# Patient Record
Sex: FEMALE | Race: BLACK OR AFRICAN AMERICAN | Employment: OTHER | URBAN - METROPOLITAN AREA
[De-identification: names, ages, dates, MRNs, and addresses within clinical notes are randomized per-mention and may not be internally consistent; named-entity substitution may affect disease eponyms.]

---

## 2017-01-11 ENCOUNTER — TELEPHONE (OUTPATIENT)
Dept: FAMILY MEDICINE CLINIC | Facility: CLINIC | Age: 42
End: 2017-01-11

## 2017-01-11 NOTE — TELEPHONE ENCOUNTER
Pt states she has abd pain x 3 days and it is sharp and sudden then passes, starts at the middle and radiates upwards. Denies fever,nausea,vomiting SOB. Voiding ok BM ok. Please advise.

## 2017-02-22 ENCOUNTER — OFFICE VISIT (OUTPATIENT)
Dept: FAMILY MEDICINE CLINIC | Facility: CLINIC | Age: 42
End: 2017-02-22

## 2017-02-22 VITALS
SYSTOLIC BLOOD PRESSURE: 114 MMHG | HEART RATE: 71 BPM | HEIGHT: 61 IN | RESPIRATION RATE: 14 BRPM | TEMPERATURE: 98 F | OXYGEN SATURATION: 99 % | BODY MASS INDEX: 41.54 KG/M2 | DIASTOLIC BLOOD PRESSURE: 70 MMHG | WEIGHT: 220 LBS

## 2017-02-22 DIAGNOSIS — J01.40 ACUTE NON-RECURRENT PANSINUSITIS: Primary | ICD-10-CM

## 2017-02-22 DIAGNOSIS — B37.9 ANTIBIOTIC-INDUCED YEAST INFECTION: ICD-10-CM

## 2017-02-22 DIAGNOSIS — T36.95XA ANTIBIOTIC-INDUCED YEAST INFECTION: ICD-10-CM

## 2017-02-22 PROCEDURE — 99213 OFFICE O/P EST LOW 20 MIN: CPT | Performed by: FAMILY MEDICINE

## 2017-02-22 RX ORDER — FLUCONAZOLE 150 MG/1
150 TABLET ORAL ONCE
Qty: 1 TABLET | Refills: 0 | Status: SHIPPED | OUTPATIENT
Start: 2017-02-22 | End: 2017-02-22

## 2017-02-22 RX ORDER — BENZONATATE 200 MG/1
200 CAPSULE ORAL 3 TIMES DAILY PRN
Qty: 30 CAPSULE | Refills: 0 | Status: SHIPPED | OUTPATIENT
Start: 2017-02-22 | End: 2017-04-03 | Stop reason: ALTCHOICE

## 2017-02-22 RX ORDER — AMOXICILLIN AND CLAVULANATE POTASSIUM 875; 125 MG/1; MG/1
1 TABLET, FILM COATED ORAL 2 TIMES DAILY
Qty: 20 TABLET | Refills: 0 | Status: SHIPPED | OUTPATIENT
Start: 2017-02-22 | End: 2017-03-04

## 2017-02-22 RX ORDER — PROMETHAZINE HYDROCHLORIDE AND CODEINE PHOSPHATE 6.25; 1 MG/5ML; MG/5ML
5 SYRUP ORAL NIGHTLY PRN
Qty: 118 ML | Refills: 0 | Status: SHIPPED | OUTPATIENT
Start: 2017-02-22 | End: 2017-09-11

## 2017-02-22 NOTE — PROGRESS NOTES
HPI:   Luisa Dean is a 46year old female who presents for upper respiratory symptoms for  5  days. Patient reports congestion, ear pain, sinus pain, wheezing.       Current Outpatient Prescriptions:  promethazine-codeine 6.25-10 MG/5ML Oral Syrup Take unspecified endocrine, nutritional, metabolic, and immunity disorders    • Screening for other and unspecified genitourinary condition    • Screening for lipoid disorders    • Screening for other and unspecified endocrine, nutritional, metabolic, and immun discussed. The patient indicates understanding of these issues and agrees to the plan. The patient is asked to return if sx's persist or worsen.

## 2017-03-23 RX ORDER — TOPIRAMATE 50 MG/1
TABLET, FILM COATED ORAL
Qty: 60 TABLET | Refills: 0 | Status: SHIPPED | OUTPATIENT
Start: 2017-03-23 | End: 2018-12-06 | Stop reason: SINTOL

## 2017-04-03 ENCOUNTER — OFFICE VISIT (OUTPATIENT)
Dept: FAMILY MEDICINE CLINIC | Facility: CLINIC | Age: 42
End: 2017-04-03

## 2017-04-03 VITALS
DIASTOLIC BLOOD PRESSURE: 62 MMHG | HEIGHT: 61 IN | RESPIRATION RATE: 18 BRPM | HEART RATE: 80 BPM | BODY MASS INDEX: 42.29 KG/M2 | SYSTOLIC BLOOD PRESSURE: 108 MMHG | WEIGHT: 224 LBS | TEMPERATURE: 98 F

## 2017-04-03 DIAGNOSIS — M21.41 FLAT FEET, BILATERAL: Primary | ICD-10-CM

## 2017-04-03 DIAGNOSIS — G89.29 CHRONIC PAIN OF RIGHT ANKLE: ICD-10-CM

## 2017-04-03 DIAGNOSIS — M21.42 FLAT FEET, BILATERAL: Primary | ICD-10-CM

## 2017-04-03 DIAGNOSIS — M79.672 LEFT FOOT PAIN: ICD-10-CM

## 2017-04-03 DIAGNOSIS — M25.571 CHRONIC PAIN OF RIGHT ANKLE: ICD-10-CM

## 2017-04-03 PROCEDURE — 99213 OFFICE O/P EST LOW 20 MIN: CPT | Performed by: FAMILY MEDICINE

## 2017-04-03 NOTE — PROGRESS NOTES
Tanya Pop is a 46year old female. HPI:   Pt. States that her feet are better. Her carmita's is 95 % better. The lift is on the left and it helps, but now notes pain on the lateral left foot rather than in the ankle.    The arch support on the right endocrine, nutritional, metabolic, and immunity disorders    • Screening for thyroid disorder    • Migraines       Social History:    Smoking Status: Never Smoker                      Smokeless Status: Never Used                        Alcohol Use: No Absolute 2.54 0.90-4.00 x10(3) uL   Monocyte Absolute 0.70 (H) 0.10-0.60 x10(3) uL   Eosinophil Absolute 0.07 0.00-0.30 x10(3) uL   Basophil Absolute 0.05 0.00-0.10 x10(3) uL   Immature Granulocyte Absolute 0.03 0.00-1.00 x10(3) uL   Neutrophil % 59.3 %

## 2017-05-31 ENCOUNTER — TELEPHONE (OUTPATIENT)
Dept: FAMILY MEDICINE CLINIC | Facility: CLINIC | Age: 42
End: 2017-05-31

## 2017-05-31 DIAGNOSIS — E55.9 VITAMIN D DEFICIENCY: ICD-10-CM

## 2017-05-31 DIAGNOSIS — R20.0 NUMBNESS: ICD-10-CM

## 2017-05-31 DIAGNOSIS — D50.8 IRON DEFICIENCY ANEMIA SECONDARY TO INADEQUATE DIETARY IRON INTAKE: ICD-10-CM

## 2017-05-31 DIAGNOSIS — R53.83 FATIGUE, UNSPECIFIED TYPE: Primary | ICD-10-CM

## 2017-05-31 NOTE — TELEPHONE ENCOUNTER
Pt aware to do labs per Dr Valeriy Oglesby, TSH WITH REFLEX, CBC WITH DIFF,FERRITIN,TIBC. Orders pended.

## 2017-05-31 NOTE — TELEPHONE ENCOUNTER
Pt transferred to nurse. Reports past week and a half has been feeling cold all over, having intermittent numbness in her feet and arms. No pain, no swelling, no discoloration, no cp, no sob. States she has anemia, wonders if it is related.   Please advise

## 2017-06-02 ENCOUNTER — LAB ENCOUNTER (OUTPATIENT)
Dept: LAB | Age: 42
End: 2017-06-02
Attending: FAMILY MEDICINE
Payer: COMMERCIAL

## 2017-06-02 DIAGNOSIS — D50.8 IRON DEFICIENCY ANEMIA SECONDARY TO INADEQUATE DIETARY IRON INTAKE: ICD-10-CM

## 2017-06-02 DIAGNOSIS — R20.0 NUMBNESS: ICD-10-CM

## 2017-06-02 DIAGNOSIS — E55.9 VITAMIN D DEFICIENCY: ICD-10-CM

## 2017-06-02 DIAGNOSIS — R53.83 FATIGUE, UNSPECIFIED TYPE: ICD-10-CM

## 2017-06-02 PROCEDURE — 36415 COLL VENOUS BLD VENIPUNCTURE: CPT

## 2017-06-02 PROCEDURE — 85025 COMPLETE CBC W/AUTO DIFF WBC: CPT

## 2017-06-02 PROCEDURE — 83550 IRON BINDING TEST: CPT

## 2017-06-02 PROCEDURE — 83540 ASSAY OF IRON: CPT

## 2017-06-02 PROCEDURE — 82728 ASSAY OF FERRITIN: CPT

## 2017-06-02 PROCEDURE — 82306 VITAMIN D 25 HYDROXY: CPT

## 2017-06-02 PROCEDURE — 84443 ASSAY THYROID STIM HORMONE: CPT

## 2017-06-05 DIAGNOSIS — E55.9 VITAMIN D DEFICIENCY: Primary | ICD-10-CM

## 2017-06-05 RX ORDER — ERGOCALCIFEROL 1.25 MG/1
50000 CAPSULE ORAL WEEKLY
Qty: 12 CAPSULE | Refills: 0 | Status: SHIPPED | OUTPATIENT
Start: 2017-06-05 | End: 2017-09-03

## 2017-06-30 ENCOUNTER — TELEPHONE (OUTPATIENT)
Dept: FAMILY MEDICINE CLINIC | Facility: CLINIC | Age: 42
End: 2017-06-30

## 2017-06-30 DIAGNOSIS — R68.84 JAW PAIN: Primary | ICD-10-CM

## 2017-06-30 NOTE — TELEPHONE ENCOUNTER
Message received from [facility] today: Please advise if okay to refer.     Reason for the order/referral:Referral   PCP: Dr. Kendall Villarreal   Refer to Provider Dr. Etelvina Vann Surgeon   Patient Insurance: Payor: [facility] BL/AD

## 2017-08-03 RX ORDER — IBUPROFEN 800 MG/1
TABLET ORAL
Qty: 90 TABLET | Refills: 0 | Status: SHIPPED | OUTPATIENT
Start: 2017-08-03 | End: 2017-12-03

## 2017-08-03 NOTE — TELEPHONE ENCOUNTER
Not a protocol medication, last OV 4/3/17 last refill 11/21/16.   Please review and refill if appropriate

## 2017-09-11 ENCOUNTER — OFFICE VISIT (OUTPATIENT)
Dept: FAMILY MEDICINE CLINIC | Facility: CLINIC | Age: 42
End: 2017-09-11

## 2017-09-11 VITALS
DIASTOLIC BLOOD PRESSURE: 70 MMHG | WEIGHT: 224 LBS | BODY MASS INDEX: 42.29 KG/M2 | HEART RATE: 84 BPM | RESPIRATION RATE: 16 BRPM | HEIGHT: 61 IN | SYSTOLIC BLOOD PRESSURE: 102 MMHG

## 2017-09-11 DIAGNOSIS — Z12.39 SCREENING FOR BREAST CANCER: ICD-10-CM

## 2017-09-11 DIAGNOSIS — E01.0 THYROMEGALY: ICD-10-CM

## 2017-09-11 DIAGNOSIS — G56.01 RIGHT CARPAL TUNNEL SYNDROME: ICD-10-CM

## 2017-09-11 DIAGNOSIS — Z12.4 SCREENING FOR CERVICAL CANCER: ICD-10-CM

## 2017-09-11 DIAGNOSIS — Z00.00 LABORATORY EXAMINATION ORDERED AS PART OF A ROUTINE GENERAL MEDICAL EXAMINATION: ICD-10-CM

## 2017-09-11 DIAGNOSIS — Z23 NEED FOR VACCINATION: ICD-10-CM

## 2017-09-11 DIAGNOSIS — Z11.1 SCREENING-PULMONARY TB: ICD-10-CM

## 2017-09-11 DIAGNOSIS — Z13.89 SCREENING FOR GENITOURINARY CONDITION: ICD-10-CM

## 2017-09-11 DIAGNOSIS — Z00.00 ROUTINE GENERAL MEDICAL EXAMINATION AT A HEALTH CARE FACILITY: Primary | ICD-10-CM

## 2017-09-11 LAB — MULTISTIX LOT#: NORMAL NUMERIC

## 2017-09-11 PROCEDURE — 90471 IMMUNIZATION ADMIN: CPT | Performed by: FAMILY MEDICINE

## 2017-09-11 PROCEDURE — 90686 IIV4 VACC NO PRSV 0.5 ML IM: CPT | Performed by: FAMILY MEDICINE

## 2017-09-11 PROCEDURE — 88175 CYTOPATH C/V AUTO FLUID REDO: CPT | Performed by: FAMILY MEDICINE

## 2017-09-11 PROCEDURE — 81003 URINALYSIS AUTO W/O SCOPE: CPT | Performed by: FAMILY MEDICINE

## 2017-09-11 PROCEDURE — 87624 HPV HI-RISK TYP POOLED RSLT: CPT | Performed by: FAMILY MEDICINE

## 2017-09-11 PROCEDURE — 86580 TB INTRADERMAL TEST: CPT | Performed by: FAMILY MEDICINE

## 2017-09-11 PROCEDURE — 99396 PREV VISIT EST AGE 40-64: CPT | Performed by: FAMILY MEDICINE

## 2017-09-11 RX ORDER — PHENTERMINE HYDROCHLORIDE 37.5 MG/1
37.5 TABLET ORAL
Qty: 30 TABLET | Refills: 2 | Status: SHIPPED | OUTPATIENT
Start: 2017-09-11 | End: 2018-07-24 | Stop reason: ALTCHOICE

## 2017-09-11 RX ORDER — PREDNISONE 20 MG/1
TABLET ORAL
Qty: 13 TABLET | Refills: 0 | Status: SHIPPED | OUTPATIENT
Start: 2017-09-11 | End: 2017-10-24 | Stop reason: ALTCHOICE

## 2017-09-11 NOTE — H&P
CC: Annual Physical Exam    HPI:   Hubert Lamb is a 38year old female who presents for a complete physical exam. Symptoms: periods are regular. Patient complains of right carpal tunnel and allergies.   Wt Readings from Last 6 Encounters:  09/11/17 : 224 Tab 3 tabs po daily x 2 days, 2 tabs po daily x 2 days, 1 tab po  dailyx 2 days, 1/2 tab po daily x 2 days, then off Disp: 13 tablet Rfl: 0   IBUPROFEN 800 MG Oral Tab TAKE 1 TABLET(800 MG) BY MOUTH EVERY 6 HOURS AS NEEDED FOR PAIN Disp: 90 tablet Rfl: 0 Smokeless tobacco: Never Used                      Alcohol use: No              Occ: . : y. Children: 3. Exercise: none.   Diet: watches fats closely, watches sugar closely and watches calories closely     REVIEW OF SYSTEMS:   GENERAL: mammogram.  UA is normal.  Last eye exam -- 6/16; due  Last dental exam -- 6/17    Wants to lose weight. Did not qualify to see Dr. Balta Gann. Never did formal weight loss program.  Adipex. Follow up in 1-2 months.   Carpal tunnel -- right wrist -- iburpfoe

## 2017-09-13 ENCOUNTER — NURSE ONLY (OUTPATIENT)
Dept: FAMILY MEDICINE CLINIC | Facility: CLINIC | Age: 42
End: 2017-09-13

## 2017-09-13 LAB
HPV I/H RISK 1 DNA SPEC QL NAA+PROBE: NEGATIVE
INDURATION (): 0 MM (ref 0–11)

## 2017-09-13 NOTE — PROGRESS NOTES
[facility] student health form completed with PPD results. Original given to patient, copy sent to scan.

## 2017-10-24 ENCOUNTER — LAB ENCOUNTER (OUTPATIENT)
Dept: LAB | Age: 42
End: 2017-10-24
Attending: FAMILY MEDICINE
Payer: COMMERCIAL

## 2017-10-24 ENCOUNTER — OFFICE VISIT (OUTPATIENT)
Dept: FAMILY MEDICINE CLINIC | Facility: CLINIC | Age: 42
End: 2017-10-24

## 2017-10-24 VITALS
SYSTOLIC BLOOD PRESSURE: 120 MMHG | WEIGHT: 224 LBS | HEIGHT: 61 IN | HEART RATE: 68 BPM | DIASTOLIC BLOOD PRESSURE: 70 MMHG | BODY MASS INDEX: 42.29 KG/M2 | RESPIRATION RATE: 14 BRPM

## 2017-10-24 DIAGNOSIS — E66.01 MORBID OBESITY (HCC): ICD-10-CM

## 2017-10-24 DIAGNOSIS — E55.9 VITAMIN D DEFICIENCY: Primary | ICD-10-CM

## 2017-10-24 DIAGNOSIS — D50.8 IRON DEFICIENCY ANEMIA SECONDARY TO INADEQUATE DIETARY IRON INTAKE: ICD-10-CM

## 2017-10-24 DIAGNOSIS — G43.901 MIGRAINE WITH STATUS MIGRAINOSUS, NOT INTRACTABLE, UNSPECIFIED MIGRAINE TYPE: ICD-10-CM

## 2017-10-24 DIAGNOSIS — E74.9 DISORDER OF CARBOHYDRATE TRANSPORT AND METABOLISM (HCC): ICD-10-CM

## 2017-10-24 DIAGNOSIS — Z00.00 LABORATORY EXAMINATION ORDERED AS PART OF A ROUTINE GENERAL MEDICAL EXAMINATION: ICD-10-CM

## 2017-10-24 DIAGNOSIS — E55.9 VITAMIN D DEFICIENCY: ICD-10-CM

## 2017-10-24 PROCEDURE — 36415 COLL VENOUS BLD VENIPUNCTURE: CPT

## 2017-10-24 PROCEDURE — 80053 COMPREHEN METABOLIC PANEL: CPT

## 2017-10-24 PROCEDURE — 99213 OFFICE O/P EST LOW 20 MIN: CPT | Performed by: FAMILY MEDICINE

## 2017-10-24 PROCEDURE — 82728 ASSAY OF FERRITIN: CPT

## 2017-10-24 PROCEDURE — 85025 COMPLETE CBC W/AUTO DIFF WBC: CPT

## 2017-10-24 PROCEDURE — 82306 VITAMIN D 25 HYDROXY: CPT

## 2017-10-24 PROCEDURE — 80061 LIPID PANEL: CPT

## 2017-10-24 NOTE — PROGRESS NOTES
Terrance Ledesma is a 38year old female. HPI:   Pt. Is here for a weight check. Adipex worked for 2 days and then she did not feel it. She stopped the medicine. She would like to consider a fruit/veggie detox.   She will try on her own and if that does n GLUCOSE (URINE DIPSTICK)  mg/dL   BILIRUBIN  Negative   KETONES (URINE DIPSTICK)  Negative mg/dL   SPECIFIC GRAVITY  1.005 - 1.030   OCCULT BLOOD  Negative   PH, URINE  4.5 - 8.0   PROTEIN (URINE DIPSTICK)  Negative/Trace mg/dL   UROBILINOGEN,SEMI-QN  0. results can occur. Regular sampling is recommended to minimize false   negative results.  [facility] REMOVED]    Case Report Gynecologic Cytology                              Case: [id number]                                  Authorizing Provider:  Matilda Nguyen this encounter    Imaging & Consults:  OP REFERRAL TO WEIGHT LOSS CLINIC   Pt. Will try to lose weight on her own and if that does not work, she will go to the weight loss clinic. Labs are due.   Migraines - stable  The patient indicates understanding of t

## 2017-10-25 ENCOUNTER — TELEPHONE (OUTPATIENT)
Dept: FAMILY MEDICINE CLINIC | Facility: CLINIC | Age: 42
End: 2017-10-25

## 2017-10-25 DIAGNOSIS — E55.9 VITAMIN D DEFICIENCY: Primary | ICD-10-CM

## 2017-10-25 RX ORDER — ERGOCALCIFEROL 1.25 MG/1
50000 CAPSULE ORAL WEEKLY
Qty: 8 CAPSULE | Refills: 0 | Status: SHIPPED | OUTPATIENT
Start: 2017-10-25 | End: 2017-11-24

## 2017-12-04 RX ORDER — IBUPROFEN 800 MG/1
800 TABLET ORAL EVERY 8 HOURS PRN
Qty: 90 TABLET | Refills: 0 | Status: SHIPPED | OUTPATIENT
Start: 2017-12-04 | End: 2018-01-24

## 2017-12-04 NOTE — TELEPHONE ENCOUNTER
Not protocol medication. LOV :9/11/17 physical   Last labs done :10/24/17  Next appointment :not yet made. Please see pending medication. Refill if appropriate.    Last refill:    Date:8/3/17  Amount :90 tablets no refill   Medication: ibuprofen 800 mg

## 2017-12-12 ENCOUNTER — TELEPHONE (OUTPATIENT)
Dept: FAMILY MEDICINE CLINIC | Facility: CLINIC | Age: 42
End: 2017-12-12

## 2017-12-12 NOTE — TELEPHONE ENCOUNTER
Received call from [facility] at [employer]. Lethia Bosworth was asking on progress of refill request for pt's ibuprofen 800 mg that was sent to us on 12/5/17.   Keisha Desai that a refill of ibuprofen 800 mg was sent to [employer] on 12/4/17 for ibuprofe

## 2017-12-29 ENCOUNTER — TELEPHONE (OUTPATIENT)
Dept: FAMILY MEDICINE CLINIC | Facility: CLINIC | Age: 42
End: 2017-12-29

## 2017-12-29 ENCOUNTER — OFFICE VISIT (OUTPATIENT)
Dept: FAMILY MEDICINE CLINIC | Facility: CLINIC | Age: 42
End: 2017-12-29

## 2017-12-29 VITALS
WEIGHT: 233 LBS | BODY MASS INDEX: 43.99 KG/M2 | HEART RATE: 68 BPM | SYSTOLIC BLOOD PRESSURE: 120 MMHG | DIASTOLIC BLOOD PRESSURE: 84 MMHG | TEMPERATURE: 99 F | RESPIRATION RATE: 16 BRPM | HEIGHT: 61 IN

## 2017-12-29 DIAGNOSIS — R10.2 PELVIC PAIN: ICD-10-CM

## 2017-12-29 DIAGNOSIS — R30.0 DYSURIA: ICD-10-CM

## 2017-12-29 DIAGNOSIS — R82.90 ABNORMAL URINE ODOR: Primary | ICD-10-CM

## 2017-12-29 DIAGNOSIS — R10.32 LLQ PAIN: ICD-10-CM

## 2017-12-29 PROCEDURE — 81003 URINALYSIS AUTO W/O SCOPE: CPT | Performed by: FAMILY MEDICINE

## 2017-12-29 PROCEDURE — 87086 URINE CULTURE/COLONY COUNT: CPT | Performed by: FAMILY MEDICINE

## 2017-12-29 PROCEDURE — 81025 URINE PREGNANCY TEST: CPT | Performed by: FAMILY MEDICINE

## 2017-12-29 PROCEDURE — 87660 TRICHOMONAS VAGIN DIR PROBE: CPT | Performed by: FAMILY MEDICINE

## 2017-12-29 PROCEDURE — 87480 CANDIDA DNA DIR PROBE: CPT | Performed by: FAMILY MEDICINE

## 2017-12-29 PROCEDURE — 87591 N.GONORRHOEAE DNA AMP PROB: CPT | Performed by: FAMILY MEDICINE

## 2017-12-29 PROCEDURE — 87510 GARDNER VAG DNA DIR PROBE: CPT | Performed by: FAMILY MEDICINE

## 2017-12-29 PROCEDURE — 87491 CHLMYD TRACH DNA AMP PROBE: CPT | Performed by: FAMILY MEDICINE

## 2017-12-29 PROCEDURE — 99214 OFFICE O/P EST MOD 30 MIN: CPT | Performed by: FAMILY MEDICINE

## 2017-12-29 RX ORDER — LEVOFLOXACIN 500 MG/1
500 TABLET, FILM COATED ORAL DAILY
Qty: 7 TABLET | Refills: 0 | Status: SHIPPED | OUTPATIENT
Start: 2017-12-29 | End: 2018-01-08

## 2017-12-29 NOTE — PROGRESS NOTES
Malorie Blanca is a 38year old female. CHIEF COMPLAINT:   Increased urination, LLQ discomfort, burning with urination  HPI:   Last Sunday started having left lower quadrant discomfort. Also increased urination.   Pain resolved and then came back yesterda and immunity disorders    • Screening for other and unspecified endocrine, nutritional, metabolic, and immunity disorders    • Screening for other and unspecified genitourinary condition    • Screening for thyroid disorder    • Unspecified sinusitis (chron Call if fever, vomiting, worsening symptoms. Will follow up with test results.

## 2017-12-31 RX ORDER — METRONIDAZOLE 7.5 MG/G
1 GEL VAGINAL NIGHTLY
Qty: 1 TUBE | Refills: 0 | Status: SHIPPED | OUTPATIENT
Start: 2017-12-31 | End: 2018-01-05

## 2018-01-24 RX ORDER — IBUPROFEN 800 MG/1
TABLET ORAL
Qty: 90 TABLET | Refills: 0 | Status: SHIPPED | OUTPATIENT
Start: 2018-01-24 | End: 2018-07-20

## 2018-04-07 ENCOUNTER — HOSPITAL ENCOUNTER (OUTPATIENT)
Age: 43
Discharge: HOME | End: 2018-04-07
Attending: FAMILY MEDICINE
Payer: COMMERCIAL

## 2018-04-07 VITALS
WEIGHT: 225 LBS | RESPIRATION RATE: 20 BRPM | TEMPERATURE: 98 F | SYSTOLIC BLOOD PRESSURE: 138 MMHG | HEART RATE: 79 BPM | BODY MASS INDEX: 42.48 KG/M2 | OXYGEN SATURATION: 100 % | HEIGHT: 61 IN | DIASTOLIC BLOOD PRESSURE: 96 MMHG

## 2018-04-07 DIAGNOSIS — S16.1XXA STRAIN OF NECK MUSCLE, INITIAL ENCOUNTER: Primary | ICD-10-CM

## 2018-04-07 PROCEDURE — 99204 OFFICE O/P NEW MOD 45 MIN: CPT

## 2018-04-07 PROCEDURE — 99213 OFFICE O/P EST LOW 20 MIN: CPT

## 2018-04-07 RX ORDER — CYCLOBENZAPRINE HCL 10 MG
10 TABLET ORAL NIGHTLY PRN
Qty: 10 TABLET | Refills: 0 | Status: SHIPPED | OUTPATIENT
Start: 2018-04-07 | End: 2020-11-30

## 2018-04-07 RX ORDER — IBUPROFEN 800 MG/1
800 TABLET ORAL ONCE
Status: COMPLETED | OUTPATIENT
Start: 2018-04-07 | End: 2018-04-07

## 2018-04-07 NOTE — ED INITIAL ASSESSMENT (HPI)
3 days ago developed neck pain  Throbbing  Denies any injury or trauma  Denies any numbness or tingling to upper extremities

## 2018-04-07 NOTE — ED PROVIDER NOTES
Patient Seen in: [facility]    History   Patient presents with:  Neck Pain    Stated Complaint: neck pain x 3 days    HPI    This 82-year-old female presents to the office with right-sided neck pain.   She states it started spont 138/96   Pulse 79   Temp 98.1 °F (36.7 °C) (Temporal)   Resp 20   Ht 154.9 cm (5' 1\")   Wt 102.1 kg   LMP 04/02/2018   SpO2 100%   BMI 42.51 kg/m²         Physical Exam    General: WH/WN/WD, in NAD, sitting and typing on her laptop, A and O times 3  HEAD: symptoms otherwise in 1 week if not improving.         Disposition and Plan     Clinical Impression:  Strain of neck muscle, initial encounter  (primary encounter diagnosis)    Disposition:  Discharge  4/7/2018 12:07 pm    Follow-up:  Jenny Santizo

## 2018-06-08 ENCOUNTER — HOSPITAL ENCOUNTER (OUTPATIENT)
Age: 43
Discharge: HOME | End: 2018-06-08
Attending: EMERGENCY MEDICINE
Payer: COMMERCIAL

## 2018-06-08 ENCOUNTER — APPOINTMENT (OUTPATIENT)
Dept: GENERAL RADIOLOGY | Age: 43
End: 2018-06-08
Attending: EMERGENCY MEDICINE
Payer: COMMERCIAL

## 2018-06-08 VITALS
DIASTOLIC BLOOD PRESSURE: 77 MMHG | OXYGEN SATURATION: 100 % | TEMPERATURE: 98 F | HEART RATE: 85 BPM | SYSTOLIC BLOOD PRESSURE: 122 MMHG | RESPIRATION RATE: 18 BRPM

## 2018-06-08 DIAGNOSIS — M76.61 TENDONITIS, ACHILLES, RIGHT: Primary | ICD-10-CM

## 2018-06-08 PROCEDURE — 73610 X-RAY EXAM OF ANKLE: CPT | Performed by: EMERGENCY MEDICINE

## 2018-06-08 PROCEDURE — 99214 OFFICE O/P EST MOD 30 MIN: CPT

## 2018-06-08 PROCEDURE — 99213 OFFICE O/P EST LOW 20 MIN: CPT

## 2018-06-08 RX ORDER — NAPROXEN 500 MG/1
500 TABLET ORAL 2 TIMES DAILY PRN
Qty: 20 TABLET | Refills: 0 | Status: SHIPPED | OUTPATIENT
Start: 2018-06-08 | End: 2018-09-09

## 2018-06-08 RX ORDER — METHYLPREDNISOLONE 4 MG/1
TABLET ORAL
Qty: 1 PACKAGE | Refills: 0 | Status: SHIPPED | OUTPATIENT
Start: 2018-06-08 | End: 2018-07-20

## 2018-06-09 NOTE — ED INITIAL ASSESSMENT (HPI)
Patient developed pain to the outer aspect of the right ankle and achilles region 8 days ago after wearing a new pain of shoes. Denies any injury.

## 2018-06-09 NOTE — ED PROVIDER NOTES
Patient Seen in: [facility]    History   Patient presents with:  Lower Extremity Injury (musculoskeletal)    Stated Complaint: Right archilles tendon/ankle pain    HPI    51-year-old female presents to the emergency department w reviewed. All other systems reviewed and negative except as noted above.     Physical Exam   ED Triage Vitals [06/08/18 1931]  BP: (!) 145/102  Pulse: 94  Resp: 20  Temp: 98.3 °F (36.8 °C)  Temp src: Temporal  SpO2: 100 %  O2 Device: None (Room air) treatment. At this time there is no bony abnormality of her ankle.   She was discharged in good condition          Disposition and Plan     Clinical Impression:  Tendonitis, Achilles, right  (primary encounter diagnosis)    Disposition:  Discharge  6/8/201

## 2018-07-20 ENCOUNTER — APPOINTMENT (OUTPATIENT)
Dept: GENERAL RADIOLOGY | Age: 43
End: 2018-07-20
Attending: EMERGENCY MEDICINE
Payer: COMMERCIAL

## 2018-07-20 ENCOUNTER — HOSPITAL ENCOUNTER (OUTPATIENT)
Age: 43
Discharge: HOME | End: 2018-07-20
Attending: EMERGENCY MEDICINE
Payer: COMMERCIAL

## 2018-07-20 VITALS
SYSTOLIC BLOOD PRESSURE: 132 MMHG | DIASTOLIC BLOOD PRESSURE: 76 MMHG | OXYGEN SATURATION: 100 % | HEART RATE: 84 BPM | RESPIRATION RATE: 20 BRPM | TEMPERATURE: 98 F

## 2018-07-20 DIAGNOSIS — R07.89 CHEST PAIN, ATYPICAL: Primary | ICD-10-CM

## 2018-07-20 LAB
#LYMPHOCYTE IC: 2.6 X10ˆ3/UL (ref 0.9–3.2)
#MXD IC: 0.6 X10ˆ3/UL (ref 0.1–1)
#NEUTROPHIL IC: 5.2 X10ˆ3/UL (ref 1.3–6.7)
CREAT SERPL-MCNC: 0.6 MG/DL (ref 0.55–1.02)
GLUCOSE BLD-MCNC: 91 MG/DL (ref 70–99)
HCT IC: 37 % (ref 37–54)
HGB IC: 11.7 G/DL (ref 12–16)
ISTAT BLOOD GAS TCO2: 25 MMOL/L (ref 22–32)
ISTAT BUN: 20 MG/DL (ref 8–20)
ISTAT CHLORIDE: 103 MMOL/L (ref 101–111)
ISTAT HEMATOCRIT: 38 % (ref 34–50)
ISTAT IONIZED CALCIUM: 1.2 MMOL/L
ISTAT POTASSIUM: 3.8 MMOL/L (ref 3.6–5.1)
ISTAT SODIUM: 139 MMOL/L (ref 136–144)
ISTAT TROPONIN: <0.1 NG/ML (ref ?–0.1)
LYMPHOCYTES NFR BLD AUTO: 31.5 %
MCH IC: 26.2 PG (ref 27–33.2)
MCHC IC: 31.6 G/DL (ref 31–37)
MCV IC: 83 FL (ref 81–100)
MIXED CELL %: 7.7 %
NEUTROPHILS NFR BLD AUTO: 60.8 %
PLT IC: 350 X10ˆ3/UL (ref 150–450)
POCT URINE PREGNANCY: NEGATIVE
RBC IC: 4.46 X10ˆ6/UL (ref 3.8–5.1)
WBC IC: 8.4 X10ˆ3/UL (ref 4–13)

## 2018-07-20 PROCEDURE — 93005 ELECTROCARDIOGRAM TRACING: CPT

## 2018-07-20 PROCEDURE — 80047 BASIC METABLC PNL IONIZED CA: CPT

## 2018-07-20 PROCEDURE — 99215 OFFICE O/P EST HI 40 MIN: CPT

## 2018-07-20 PROCEDURE — 36415 COLL VENOUS BLD VENIPUNCTURE: CPT

## 2018-07-20 PROCEDURE — 84484 ASSAY OF TROPONIN QUANT: CPT

## 2018-07-20 PROCEDURE — 71046 X-RAY EXAM CHEST 2 VIEWS: CPT | Performed by: EMERGENCY MEDICINE

## 2018-07-20 PROCEDURE — 93010 ELECTROCARDIOGRAM REPORT: CPT

## 2018-07-20 PROCEDURE — 85025 COMPLETE CBC W/AUTO DIFF WBC: CPT | Performed by: EMERGENCY MEDICINE

## 2018-07-20 PROCEDURE — 81025 URINE PREGNANCY TEST: CPT | Performed by: EMERGENCY MEDICINE

## 2018-07-20 NOTE — ED PROVIDER NOTES
Patient Seen in: [facility] & [facility]    History   Patient presents with:  Chest Pain Angina (cardiovascular)    Stated Complaint: chest pain, rapid heart, x1week    HPI    62-year-old female presents to the emergency department complaini Smokeless tobacco: Never Used                      Alcohol use: No                Review of Systems    Positive for stated complaint: chest pain, rapid heart, x1week  Other systems are as noted in HPI.   Constitutional and vital signs rev on 7/20/2018 at 19:27     Approved by: Eladia Mitchell MD            Test results and treatment plan were discussed prior to disposition. Primary care follow-up was recommended. MDM     #1. Atypical chest pain.   Extremely brief duration of symptoms

## 2018-07-20 NOTE — ED INITIAL ASSESSMENT (HPI)
Patient c/o feeling \"heart racing\" for months, bilateral arm tingling, and sharp left sided chest pain that started today. Sts that the \"heart racing\" is intermittent, but cannot attribute it to any specific activity.  Sts that the chest pain was worse

## 2018-07-21 ENCOUNTER — TELEPHONE (OUTPATIENT)
Dept: FAMILY MEDICINE CLINIC | Facility: CLINIC | Age: 43
End: 2018-07-21

## 2018-07-21 LAB
ATRIAL RATE: 80 BPM
P AXIS: 53 DEGREES
P-R INTERVAL: 150 MS
Q-T INTERVAL: 356 MS
QRS DURATION: 74 MS
QTC CALCULATION (BEZET): 410 MS
R AXIS: 17 DEGREES
T AXIS: 30 DEGREES
VENTRICULAR RATE: 80 BPM

## 2018-07-24 ENCOUNTER — LAB ENCOUNTER (OUTPATIENT)
Dept: LAB | Facility: HOSPITAL | Age: 43
End: 2018-07-24
Attending: NURSE PRACTITIONER
Payer: COMMERCIAL

## 2018-07-24 ENCOUNTER — OFFICE VISIT (OUTPATIENT)
Dept: FAMILY MEDICINE CLINIC | Facility: CLINIC | Age: 43
End: 2018-07-24

## 2018-07-24 VITALS
WEIGHT: 228 LBS | HEART RATE: 88 BPM | RESPIRATION RATE: 16 BRPM | OXYGEN SATURATION: 98 % | HEIGHT: 61 IN | TEMPERATURE: 99 F | BODY MASS INDEX: 43.05 KG/M2 | DIASTOLIC BLOOD PRESSURE: 70 MMHG | SYSTOLIC BLOOD PRESSURE: 120 MMHG

## 2018-07-24 DIAGNOSIS — R06.00 DYSPNEA ON EXERTION: ICD-10-CM

## 2018-07-24 DIAGNOSIS — R00.2 PALPITATIONS: Primary | ICD-10-CM

## 2018-07-24 DIAGNOSIS — R00.2 PALPITATIONS: ICD-10-CM

## 2018-07-24 LAB
ALBUMIN SERPL-MCNC: 3.7 G/DL (ref 3.5–4.8)
ALBUMIN/GLOB SERPL: 0.9 {RATIO} (ref 1–2)
ALP LIVER SERPL-CCNC: 88 U/L (ref 37–98)
ALT SERPL-CCNC: 25 U/L (ref 14–54)
ANION GAP SERPL CALC-SCNC: 7 MMOL/L (ref 0–18)
AST SERPL-CCNC: 13 U/L (ref 15–41)
BASOPHILS # BLD AUTO: 0.05 X10(3) UL (ref 0–0.1)
BASOPHILS NFR BLD AUTO: 0.6 %
BILIRUB SERPL-MCNC: 0.6 MG/DL (ref 0.1–2)
BUN BLD-MCNC: 14 MG/DL (ref 8–20)
BUN/CREAT SERPL: 18.9 (ref 10–20)
CALCIUM BLD-MCNC: 9.6 MG/DL (ref 8.3–10.3)
CHLORIDE SERPL-SCNC: 105 MMOL/L (ref 101–111)
CO2 SERPL-SCNC: 27 MMOL/L (ref 22–32)
CREAT BLD-MCNC: 0.74 MG/DL (ref 0.55–1.02)
D-DIMER: 0.49 UG/ML FEU (ref 0–0.49)
EOSINOPHIL # BLD AUTO: 0.07 X10(3) UL (ref 0–0.3)
EOSINOPHIL NFR BLD AUTO: 0.9 %
ERYTHROCYTE [DISTWIDTH] IN BLOOD BY AUTOMATED COUNT: 15.8 % (ref 11.5–16)
GLOBULIN PLAS-MCNC: 4 G/DL (ref 2.5–3.7)
GLUCOSE BLD-MCNC: 90 MG/DL (ref 70–99)
HCT VFR BLD AUTO: 37.1 % (ref 34–50)
HGB BLD-MCNC: 11.5 G/DL (ref 12–16)
IMMATURE GRANULOCYTE COUNT: 0.02 X10(3) UL (ref 0–1)
IMMATURE GRANULOCYTE RATIO %: 0.2 %
LYMPHOCYTES # BLD AUTO: 2.12 X10(3) UL (ref 0.9–4)
LYMPHOCYTES NFR BLD AUTO: 25.9 %
M PROTEIN MFR SERPL ELPH: 7.7 G/DL (ref 6.1–8.3)
MCH RBC QN AUTO: 25.7 PG (ref 27–33.2)
MCHC RBC AUTO-ENTMCNC: 31 G/DL (ref 31–37)
MCV RBC AUTO: 83 FL (ref 81–100)
MONOCYTES # BLD AUTO: 0.54 X10(3) UL (ref 0.1–1)
MONOCYTES NFR BLD AUTO: 6.6 %
NEUTROPHIL ABS PRELIM: 5.4 X10 (3) UL (ref 1.3–6.7)
NEUTROPHILS # BLD AUTO: 5.4 X10(3) UL (ref 1.3–6.7)
NEUTROPHILS NFR BLD AUTO: 65.8 %
OSMOLALITY SERPL CALC.SUM OF ELEC: 288 MOSM/KG (ref 275–295)
PLATELET # BLD AUTO: 361 10(3)UL (ref 150–450)
POTASSIUM SERPL-SCNC: 4 MMOL/L (ref 3.6–5.1)
RBC # BLD AUTO: 4.47 X10(6)UL (ref 3.8–5.1)
RED CELL DISTRIBUTION WIDTH-SD: 47.6 FL (ref 35.1–46.3)
SODIUM SERPL-SCNC: 139 MMOL/L (ref 136–144)
T4 FREE SERPL-MCNC: 1.2 NG/DL (ref 0.9–1.8)
TSI SER-ACNC: 1.79 MIU/ML (ref 0.35–5.5)
WBC # BLD AUTO: 8.2 X10(3) UL (ref 4–13)

## 2018-07-24 PROCEDURE — 85025 COMPLETE CBC W/AUTO DIFF WBC: CPT

## 2018-07-24 PROCEDURE — 84443 ASSAY THYROID STIM HORMONE: CPT

## 2018-07-24 PROCEDURE — 36415 COLL VENOUS BLD VENIPUNCTURE: CPT

## 2018-07-24 PROCEDURE — 99214 OFFICE O/P EST MOD 30 MIN: CPT | Performed by: NURSE PRACTITIONER

## 2018-07-24 PROCEDURE — 80053 COMPREHEN METABOLIC PANEL: CPT

## 2018-07-24 PROCEDURE — 85378 FIBRIN DEGRADE SEMIQUANT: CPT

## 2018-07-24 PROCEDURE — 84439 ASSAY OF FREE THYROXINE: CPT

## 2018-07-24 NOTE — TELEPHONE ENCOUNTER
Pt is going out of town tomorrow offered an appt today with El Russell pt could not fit it in her schedule. She will be back on Monday did not say if was driving or flying and I forgot to ask. Pt will call when she returns.

## 2018-07-24 NOTE — PROGRESS NOTES
Devonda Phoenix is a 41year old female. HPI:   Patient presents today for an urgent care follow up on palpitations and atypical chest pain. Patient was seen at the [facility] 07/20/2018.  She reports that since her immediate care visit the • Screening for thyroid disorder    • Unspecified sinusitis (chronic)       Social History:  Smoking status: Never Smoker                                                              Smokeless tobacco: Never Used                      Alcohol use:  No STRESS(CPT=93350/85350 DM 24289); Future    2. Dyspnea on exertion  STAT D-Dimer ordered. 02 saturation= 98%, patient is not tachycardic.  - D-DIMER; Future  - CARD ECHO STRESS ECHO/REST AND STRESS(CPT=93350/66268 [facility] [address]);  Future      The patient purnima

## 2018-09-09 ENCOUNTER — HOSPITAL ENCOUNTER (EMERGENCY)
Facility: HOSPITAL | Age: 43
Discharge: HOME | End: 2018-09-09
Attending: EMERGENCY MEDICINE
Payer: COMMERCIAL

## 2018-09-09 ENCOUNTER — PRIOR ORIGINAL RECORDS (OUTPATIENT)
Dept: OTHER | Age: 43
End: 2018-09-09

## 2018-09-09 ENCOUNTER — APPOINTMENT (OUTPATIENT)
Dept: GENERAL RADIOLOGY | Facility: HOSPITAL | Age: 43
End: 2018-09-09
Attending: EMERGENCY MEDICINE
Payer: COMMERCIAL

## 2018-09-09 ENCOUNTER — APPOINTMENT (OUTPATIENT)
Dept: CT IMAGING | Facility: HOSPITAL | Age: 43
End: 2018-09-09
Attending: EMERGENCY MEDICINE
Payer: COMMERCIAL

## 2018-09-09 VITALS
RESPIRATION RATE: 16 BRPM | HEART RATE: 80 BPM | SYSTOLIC BLOOD PRESSURE: 131 MMHG | HEIGHT: 61 IN | BODY MASS INDEX: 43.61 KG/M2 | TEMPERATURE: 98 F | OXYGEN SATURATION: 96 % | DIASTOLIC BLOOD PRESSURE: 82 MMHG | WEIGHT: 231 LBS

## 2018-09-09 DIAGNOSIS — R07.89 CHEST PAIN, ATYPICAL: Primary | ICD-10-CM

## 2018-09-09 LAB
ALBUMIN SERPL-MCNC: 3.6 G/DL (ref 3.5–4.8)
ALBUMIN/GLOB SERPL: 1 {RATIO} (ref 1–2)
ALP LIVER SERPL-CCNC: 94 U/L (ref 37–98)
ALT SERPL-CCNC: 22 U/L (ref 14–54)
ANION GAP SERPL CALC-SCNC: 5 MMOL/L (ref 0–18)
AST SERPL-CCNC: 13 U/L (ref 15–41)
BASOPHILS # BLD AUTO: 0.04 X10(3) UL (ref 0–0.1)
BASOPHILS NFR BLD AUTO: 0.5 %
BILIRUB SERPL-MCNC: 0.4 MG/DL (ref 0.1–2)
BUN BLD-MCNC: 15 MG/DL (ref 8–20)
BUN/CREAT SERPL: 19.7 (ref 10–20)
CALCIUM BLD-MCNC: 8.4 MG/DL (ref 8.3–10.3)
CHLORIDE SERPL-SCNC: 106 MMOL/L (ref 101–111)
CO2 SERPL-SCNC: 28 MMOL/L (ref 22–32)
CREAT BLD-MCNC: 0.76 MG/DL (ref 0.55–1.02)
D-DIMER: 2.02 UG/ML FEU (ref 0–0.49)
EOSINOPHIL # BLD AUTO: 0.21 X10(3) UL (ref 0–0.3)
EOSINOPHIL NFR BLD AUTO: 2.5 %
ERYTHROCYTE [DISTWIDTH] IN BLOOD BY AUTOMATED COUNT: 15.4 % (ref 11.5–16)
GLOBULIN PLAS-MCNC: 3.6 G/DL (ref 2.5–4)
GLUCOSE BLD-MCNC: 103 MG/DL (ref 70–99)
HCT VFR BLD AUTO: 35.9 % (ref 34–50)
HGB BLD-MCNC: 11.5 G/DL (ref 12–16)
IMMATURE GRANULOCYTE COUNT: 0.01 X10(3) UL (ref 0–1)
IMMATURE GRANULOCYTE RATIO %: 0.1 %
LYMPHOCYTES # BLD AUTO: 3.12 X10(3) UL (ref 0.9–4)
LYMPHOCYTES NFR BLD AUTO: 37.2 %
M PROTEIN MFR SERPL ELPH: 7.2 G/DL (ref 6.1–8.3)
MCH RBC QN AUTO: 26.6 PG (ref 27–33.2)
MCHC RBC AUTO-ENTMCNC: 32 G/DL (ref 31–37)
MCV RBC AUTO: 83.1 FL (ref 81–100)
MONOCYTES # BLD AUTO: 0.56 X10(3) UL (ref 0.1–1)
MONOCYTES NFR BLD AUTO: 6.7 %
NEUTROPHIL ABS PRELIM: 4.45 X10 (3) UL (ref 1.3–6.7)
NEUTROPHILS # BLD AUTO: 4.45 X10(3) UL (ref 1.3–6.7)
NEUTROPHILS NFR BLD AUTO: 53 %
OSMOLALITY SERPL CALC.SUM OF ELEC: 289 MOSM/KG (ref 275–295)
PLATELET # BLD AUTO: 285 10(3)UL (ref 150–450)
POTASSIUM SERPL-SCNC: 3.6 MMOL/L (ref 3.6–5.1)
RBC # BLD AUTO: 4.32 X10(6)UL (ref 3.8–5.1)
RED CELL DISTRIBUTION WIDTH-SD: 46.8 FL (ref 35.1–46.3)
SODIUM SERPL-SCNC: 139 MMOL/L (ref 136–144)
TROPONIN I SERPL-MCNC: <0.046 NG/ML (ref ?–0.05)
WBC # BLD AUTO: 8.4 X10(3) UL (ref 4–13)

## 2018-09-09 PROCEDURE — 80053 COMPREHEN METABOLIC PANEL: CPT | Performed by: EMERGENCY MEDICINE

## 2018-09-09 PROCEDURE — 71046 X-RAY EXAM CHEST 2 VIEWS: CPT | Performed by: EMERGENCY MEDICINE

## 2018-09-09 PROCEDURE — 84484 ASSAY OF TROPONIN QUANT: CPT | Performed by: EMERGENCY MEDICINE

## 2018-09-09 PROCEDURE — 71275 CT ANGIOGRAPHY CHEST: CPT | Performed by: EMERGENCY MEDICINE

## 2018-09-09 PROCEDURE — 99285 EMERGENCY DEPT VISIT HI MDM: CPT

## 2018-09-09 PROCEDURE — 93010 ELECTROCARDIOGRAM REPORT: CPT

## 2018-09-09 PROCEDURE — 93005 ELECTROCARDIOGRAM TRACING: CPT

## 2018-09-09 PROCEDURE — 36415 COLL VENOUS BLD VENIPUNCTURE: CPT

## 2018-09-09 PROCEDURE — 85378 FIBRIN DEGRADE SEMIQUANT: CPT | Performed by: EMERGENCY MEDICINE

## 2018-09-09 PROCEDURE — 85025 COMPLETE CBC W/AUTO DIFF WBC: CPT | Performed by: EMERGENCY MEDICINE

## 2018-09-09 RX ORDER — ASPIRIN 81 MG/1
324 TABLET, CHEWABLE ORAL ONCE
Status: COMPLETED | OUTPATIENT
Start: 2018-09-09 | End: 2018-09-09

## 2018-09-10 LAB
ATRIAL RATE: 80 BPM
P AXIS: 49 DEGREES
P-R INTERVAL: 168 MS
Q-T INTERVAL: 376 MS
QRS DURATION: 74 MS
QTC CALCULATION (BEZET): 433 MS
R AXIS: 22 DEGREES
T AXIS: 40 DEGREES
VENTRICULAR RATE: 80 BPM

## 2018-09-10 NOTE — ED INITIAL ASSESSMENT (HPI)
Complaint of left-sided chest pain she describes as \"sharp\" approximately one hour ago; she states the pain radiates to her left shoulder and back area. Patient states she was driving when the pain occurred.  She reports intermittent chest pain to the tamika

## 2018-09-10 NOTE — ED PROVIDER NOTES
Patient Seen in: [facility] Emergency Department    History   Patient presents with:  Chest Pain Angina (cardiovascular)    Stated Complaint: cp    HPI    44-year-old female presents emergency room for evaluation of chest pain.   Patient states she h Never Used    Alcohol use: No      Alcohol/week: 0.0 oz    Drug use: No      Review of Systems    Positive for stated complaint: cp  Other systems are as noted in HPI. Constitutional and vital signs reviewed.       All other systems reviewed and negative e Trimester:  0.32-1.29 ug/mL (FEU)    3rd Trimester:  0.13-1.70 ug/mL (FEU)    In pregnant females, refer to the chart above.   No studies have been performed  on pregnant females exclusively to validate the 95% negative predictive value  for venous thromboe emergency medical condition was not or was no longer present. There was no indication for further evaluation, treatment, or admission on an emergency basis.   Comprehensive verbal and written discharge and follow-up instructions were provided to help preve

## 2018-09-11 ENCOUNTER — OFFICE VISIT (OUTPATIENT)
Dept: FAMILY MEDICINE CLINIC | Facility: CLINIC | Age: 43
End: 2018-09-11

## 2018-09-11 VITALS
DIASTOLIC BLOOD PRESSURE: 80 MMHG | BODY MASS INDEX: 43.43 KG/M2 | TEMPERATURE: 97 F | OXYGEN SATURATION: 100 % | RESPIRATION RATE: 16 BRPM | HEIGHT: 61 IN | HEART RATE: 82 BPM | WEIGHT: 230 LBS | SYSTOLIC BLOOD PRESSURE: 120 MMHG

## 2018-09-11 DIAGNOSIS — R14.2 BELCHING: ICD-10-CM

## 2018-09-11 DIAGNOSIS — K21.9 GASTROESOPHAGEAL REFLUX DISEASE, ESOPHAGITIS PRESENCE NOT SPECIFIED: ICD-10-CM

## 2018-09-11 DIAGNOSIS — R07.89 OTHER CHEST PAIN: ICD-10-CM

## 2018-09-11 DIAGNOSIS — Z09 HOSPITAL DISCHARGE FOLLOW-UP: Primary | ICD-10-CM

## 2018-09-11 PROCEDURE — 99214 OFFICE O/P EST MOD 30 MIN: CPT | Performed by: FAMILY MEDICINE

## 2018-09-11 RX ORDER — OMEPRAZOLE 40 MG/1
40 CAPSULE, DELAYED RELEASE ORAL DAILY
Qty: 90 CAPSULE | Refills: 0 | Status: SHIPPED | OUTPATIENT
Start: 2018-09-11 | End: 2018-12-12

## 2018-09-11 NOTE — PROGRESS NOTES
Cheryl Reina is a 41year old female. HPI:   Pt. Complains of chest pain and seen in the ER on 9/9. Had an episode in 7/18 and saw Crys Murillo. It is a sharp pain in the center of the chest that she had while driving and lasted only seconds.   No SOB, no h performed during the hospital encounter of 59/97/40   COMP METABOLIC PANEL (14)   Result Value Ref Range    Glucose 103 (H) 70 - 99 mg/dL    Sodium 139 136 - 144 mmol/L    Potassium 3.6 3.6 - 5.1 mmol/L    Chloride 106 101 - 111 mmol/L    CO2 28.0 22.0 - 3 4. 45 1.30 - 6.70 x10(3) uL    Lymphocyte Absolute 3.12 0.90 - 4.00 x10(3) uL    Monocyte Absolute 0.56 0.10 - 1.00 x10(3) uL    Eosinophil Absolute 0.21 0.00 - 0.30 x10(3) uL    Basophil Absolute 0.04 0.00 - 0.10 x10(3) uL    Immature Granulocyte Absolute ordered. Reviewed ER notes. The patient indicates understanding of these issues and agrees to the plan. Return in about 4 weeks (around 10/9/2018) for GERD symptoms.

## 2018-09-13 ENCOUNTER — HOSPITAL ENCOUNTER (OUTPATIENT)
Dept: CV DIAGNOSTICS | Facility: HOSPITAL | Age: 43
Discharge: HOME | End: 2018-09-13
Attending: NURSE PRACTITIONER
Payer: COMMERCIAL

## 2018-09-13 DIAGNOSIS — R06.00 DYSPNEA ON EXERTION: ICD-10-CM

## 2018-09-13 DIAGNOSIS — R00.2 PALPITATIONS: ICD-10-CM

## 2018-09-13 PROCEDURE — 93350 STRESS TTE ONLY: CPT | Performed by: NURSE PRACTITIONER

## 2018-09-13 PROCEDURE — 93018 CV STRESS TEST I&R ONLY: CPT | Performed by: NURSE PRACTITIONER

## 2018-09-13 PROCEDURE — 93017 CV STRESS TEST TRACING ONLY: CPT | Performed by: NURSE PRACTITIONER

## 2018-09-17 ENCOUNTER — TELEPHONE (OUTPATIENT)
Dept: FAMILY MEDICINE CLINIC | Facility: CLINIC | Age: 43
End: 2018-09-17

## 2018-09-17 DIAGNOSIS — R94.31 ABNORMAL EKG: ICD-10-CM

## 2018-09-17 DIAGNOSIS — I49.9 IRREGULAR HEART RHYTHM: Primary | ICD-10-CM

## 2018-09-17 DIAGNOSIS — R07.9 CHEST PAIN, UNSPECIFIED TYPE: ICD-10-CM

## 2018-09-19 ENCOUNTER — MYAURORA ACCOUNT LINK (OUTPATIENT)
Dept: OTHER | Age: 43
End: 2018-09-19

## 2018-09-19 ENCOUNTER — PRIOR ORIGINAL RECORDS (OUTPATIENT)
Dept: OTHER | Age: 43
End: 2018-09-19

## 2018-09-22 ENCOUNTER — HOSPITAL ENCOUNTER (OUTPATIENT)
Dept: CV DIAGNOSTICS | Facility: HOSPITAL | Age: 43
Discharge: HOME | End: 2018-09-22
Attending: INTERNAL MEDICINE
Payer: COMMERCIAL

## 2018-09-22 DIAGNOSIS — R06.00 DYSPNEA: ICD-10-CM

## 2018-09-22 DIAGNOSIS — R00.2 HEART PALPITATIONS: ICD-10-CM

## 2018-09-22 DIAGNOSIS — I47.1 SVT (SUPRAVENTRICULAR TACHYCARDIA) (HCC): ICD-10-CM

## 2018-09-22 PROCEDURE — 93227 XTRNL ECG REC<48 HR R&I: CPT | Performed by: INTERNAL MEDICINE

## 2018-09-22 PROCEDURE — 93225 XTRNL ECG REC<48 HRS REC: CPT | Performed by: INTERNAL MEDICINE

## 2018-09-22 PROCEDURE — 93226 XTRNL ECG REC<48 HR SCAN A/R: CPT | Performed by: INTERNAL MEDICINE

## 2018-09-27 LAB
ALBUMIN: 3.6 G/DL
ALKALINE PHOSPHATATE(ALK PHOS): 94 IU/L
BILIRUBIN TOTAL: 0.4 MG/DL
BUN: 15 MG/DL
CALCIUM: 8.4 MG/DL
CHLORIDE: 106 MEQ/L
CREATININE, SERUM: 0.76 MG/DL
GLOBULIN: 3.6 G/DL
GLUCOSE: 103 MG/DL
HEMATOCRIT: 35.9 %
HEMOGLOBIN: 11.5 G/DL
PLATELETS: 285 K/UL
POTASSIUM, SERUM: 3.6 MEQ/L
PROTEIN, TOTAL: 7.2 G/DL
RED BLOOD COUNT: 4.32 X 10-6/U
SGOT (AST): 13 IU/L
SGPT (ALT): 22 IU/L
SODIUM: 139 MEQ/L
WHITE BLOOD COUNT: 8.4 X 10-3/U

## 2018-10-05 ENCOUNTER — PRIOR ORIGINAL RECORDS (OUTPATIENT)
Dept: OTHER | Age: 43
End: 2018-10-05

## 2018-10-11 ENCOUNTER — TELEPHONE (OUTPATIENT)
Dept: FAMILY MEDICINE CLINIC | Facility: CLINIC | Age: 43
End: 2018-10-11

## 2018-10-11 ENCOUNTER — HOSPITAL ENCOUNTER (OUTPATIENT)
Age: 43
Discharge: ANOTHER HOSPITAL | End: 2018-10-11
Payer: COMMERCIAL

## 2018-10-11 ENCOUNTER — HOSPITAL ENCOUNTER (EMERGENCY)
Facility: HOSPITAL | Age: 43
Discharge: HOME | End: 2018-10-11
Attending: EMERGENCY MEDICINE
Payer: COMMERCIAL

## 2018-10-11 ENCOUNTER — APPOINTMENT (OUTPATIENT)
Dept: CT IMAGING | Facility: HOSPITAL | Age: 43
End: 2018-10-11
Attending: EMERGENCY MEDICINE
Payer: COMMERCIAL

## 2018-10-11 VITALS
BODY MASS INDEX: 42.33 KG/M2 | HEART RATE: 80 BPM | OXYGEN SATURATION: 100 % | DIASTOLIC BLOOD PRESSURE: 70 MMHG | SYSTOLIC BLOOD PRESSURE: 112 MMHG | RESPIRATION RATE: 16 BRPM | HEIGHT: 62 IN | WEIGHT: 230 LBS | TEMPERATURE: 97 F

## 2018-10-11 VITALS
BODY MASS INDEX: 42.33 KG/M2 | TEMPERATURE: 97 F | DIASTOLIC BLOOD PRESSURE: 78 MMHG | OXYGEN SATURATION: 100 % | RESPIRATION RATE: 20 BRPM | WEIGHT: 230 LBS | HEART RATE: 87 BPM | SYSTOLIC BLOOD PRESSURE: 122 MMHG | HEIGHT: 62 IN

## 2018-10-11 DIAGNOSIS — R07.9 ACUTE CHEST PAIN: ICD-10-CM

## 2018-10-11 DIAGNOSIS — R06.02 SHORTNESS OF BREATH: Primary | ICD-10-CM

## 2018-10-11 DIAGNOSIS — R06.02 SHORTNESS OF BREATH: ICD-10-CM

## 2018-10-11 DIAGNOSIS — R79.89 POSITIVE D DIMER: Primary | ICD-10-CM

## 2018-10-11 PROCEDURE — 36415 COLL VENOUS BLD VENIPUNCTURE: CPT

## 2018-10-11 PROCEDURE — 93010 ELECTROCARDIOGRAM REPORT: CPT

## 2018-10-11 PROCEDURE — 99214 OFFICE O/P EST MOD 30 MIN: CPT

## 2018-10-11 PROCEDURE — 84484 ASSAY OF TROPONIN QUANT: CPT

## 2018-10-11 PROCEDURE — 71275 CT ANGIOGRAPHY CHEST: CPT | Performed by: EMERGENCY MEDICINE

## 2018-10-11 PROCEDURE — 85025 COMPLETE CBC W/AUTO DIFF WBC: CPT | Performed by: NURSE PRACTITIONER

## 2018-10-11 PROCEDURE — 80047 BASIC METABLC PNL IONIZED CA: CPT

## 2018-10-11 PROCEDURE — 99284 EMERGENCY DEPT VISIT MOD MDM: CPT

## 2018-10-11 PROCEDURE — 85378 FIBRIN DEGRADE SEMIQUANT: CPT | Performed by: NURSE PRACTITIONER

## 2018-10-11 PROCEDURE — 81025 URINE PREGNANCY TEST: CPT | Performed by: NURSE PRACTITIONER

## 2018-10-11 PROCEDURE — 93005 ELECTROCARDIOGRAM TRACING: CPT

## 2018-10-11 NOTE — ED INITIAL ASSESSMENT (HPI)
Patient sent here from [facility] for elevated D-dimer and shortness of breath that has been on and off for the last couple of months, became worse the last 2 days. Denies any recent travel.

## 2018-10-11 NOTE — ED PROVIDER NOTES
Patient Seen in: [facility]    History   No chief complaint on file.     Stated Complaint: difficulty breathing    HPI Patient is a 82-year-old female that presents 2-month history of intermittent shortness of breath and palpitations. Shortness of breath has worsened over the past few days. Mid-sternal chest tightness today rated 3/10.     There are no aggravating or allev • Personal history of urinary calculi    • Routine gynecological examination    • Screening for lipoid disorders    • Screening for other and unspecified endocrine, nutritional, metabolic, and immunity disorders    • Screening for other and unspecified end Eyes: Conjunctivae are normal. Right eye exhibits no discharge. Left eye exhibits no discharge. Neck: Normal range of motion. Neck supple. Cardiovascular: Normal rate, regular rhythm, normal heart sounds and intact distal pulses.  Exam reveals no gallop Discussed with patient that she requires further cardiopulmonary workup which we are unable to provide at the immediate care facility. Patient is stable and will drive herself to [facility] emergency department for further work up and evaluation.   Vi

## 2018-10-11 NOTE — ED INITIAL ASSESSMENT (HPI)
Couple months of difficulty breathing  Denies any history of asthma  Denies any cough  Denies any fever  States has had cardiac workup- history of A.  Fib

## 2018-10-11 NOTE — ED PROVIDER NOTES
Patient Seen in: [facility] Emergency Department    History   Patient presents with:  Dyspnea TSERING SOB (respiratory)    Stated Complaint: elevated D dimer at [facility], SOB    HPI    72-year-old female presents for evaluation of shortness of breath.   Jocelyn No      Review of Systems    Positive for stated complaint: elevated D dimer at [facility], SOB  Other systems are as noted in HPI. Constitutional and vital signs reviewed. All other systems reviewed and negative except as noted above.     Physical Exam FINDINGS:  VASCULATURE:  No evidence of pulmonary embolism. THORACIC AORTA:  No thoracic aortic dissection. LUNGS:  Bilateral subsegmental atelectasis. No focal consolidation. .. MEDIASTINUM/CANDICE:  No enlarged adenopathy.   Small esophageal hiatal hernia CA

## 2018-10-12 ENCOUNTER — HOSPITAL ENCOUNTER (OUTPATIENT)
Dept: CV DIAGNOSTICS | Facility: HOSPITAL | Age: 43
Discharge: HOME | End: 2018-10-12
Attending: INTERNAL MEDICINE

## 2018-10-12 ENCOUNTER — MYAURORA ACCOUNT LINK (OUTPATIENT)
Dept: OTHER | Age: 43
End: 2018-10-12

## 2018-10-12 DIAGNOSIS — R00.2 PALPITATIONS: ICD-10-CM

## 2018-10-12 DIAGNOSIS — R06.00 DYSPNEA, UNSPECIFIED TYPE: ICD-10-CM

## 2018-10-19 ENCOUNTER — OFFICE VISIT (OUTPATIENT)
Dept: FAMILY MEDICINE CLINIC | Facility: CLINIC | Age: 43
End: 2018-10-19

## 2018-10-19 VITALS
RESPIRATION RATE: 14 BRPM | HEIGHT: 61 IN | WEIGHT: 233 LBS | DIASTOLIC BLOOD PRESSURE: 80 MMHG | SYSTOLIC BLOOD PRESSURE: 124 MMHG | BODY MASS INDEX: 43.99 KG/M2 | HEART RATE: 70 BPM

## 2018-10-19 DIAGNOSIS — Z23 NEED FOR VACCINATION: ICD-10-CM

## 2018-10-19 DIAGNOSIS — I49.1 PAC (PREMATURE ATRIAL CONTRACTION): ICD-10-CM

## 2018-10-19 DIAGNOSIS — Z00.00 LABORATORY EXAMINATION ORDERED AS PART OF A ROUTINE GENERAL MEDICAL EXAMINATION: ICD-10-CM

## 2018-10-19 DIAGNOSIS — Z12.4 SCREENING FOR CERVICAL CANCER: ICD-10-CM

## 2018-10-19 DIAGNOSIS — Z11.3 SCREENING FOR STD (SEXUALLY TRANSMITTED DISEASE): ICD-10-CM

## 2018-10-19 DIAGNOSIS — E01.0 THYROMEGALY: ICD-10-CM

## 2018-10-19 DIAGNOSIS — I49.3 PVC (PREMATURE VENTRICULAR CONTRACTION): ICD-10-CM

## 2018-10-19 DIAGNOSIS — I48.0 PAF (PAROXYSMAL ATRIAL FIBRILLATION) (HCC): ICD-10-CM

## 2018-10-19 DIAGNOSIS — R79.89 ELEVATED D-DIMER: ICD-10-CM

## 2018-10-19 DIAGNOSIS — Z12.31 ENCOUNTER FOR SCREENING MAMMOGRAM FOR BREAST CANCER: ICD-10-CM

## 2018-10-19 DIAGNOSIS — Z00.00 ROUTINE GENERAL MEDICAL EXAMINATION AT A HEALTH CARE FACILITY: Primary | ICD-10-CM

## 2018-10-19 DIAGNOSIS — Z13.89 SCREENING FOR GENITOURINARY CONDITION: ICD-10-CM

## 2018-10-19 DIAGNOSIS — E66.01 MORBIDLY OBESE (HCC): ICD-10-CM

## 2018-10-19 PROCEDURE — 87591 N.GONORRHOEAE DNA AMP PROB: CPT | Performed by: FAMILY MEDICINE

## 2018-10-19 PROCEDURE — 90686 IIV4 VACC NO PRSV 0.5 ML IM: CPT | Performed by: FAMILY MEDICINE

## 2018-10-19 PROCEDURE — 87491 CHLMYD TRACH DNA AMP PROBE: CPT | Performed by: FAMILY MEDICINE

## 2018-10-19 PROCEDURE — 88175 CYTOPATH C/V AUTO FLUID REDO: CPT | Performed by: FAMILY MEDICINE

## 2018-10-19 PROCEDURE — 87624 HPV HI-RISK TYP POOLED RSLT: CPT | Performed by: FAMILY MEDICINE

## 2018-10-19 PROCEDURE — 90471 IMMUNIZATION ADMIN: CPT | Performed by: FAMILY MEDICINE

## 2018-10-19 PROCEDURE — 99396 PREV VISIT EST AGE 40-64: CPT | Performed by: FAMILY MEDICINE

## 2018-10-19 PROCEDURE — 81003 URINALYSIS AUTO W/O SCOPE: CPT | Performed by: FAMILY MEDICINE

## 2018-10-19 RX ORDER — METOPROLOL SUCCINATE 25 MG/1
25 TABLET, EXTENDED RELEASE ORAL DAILY
Refills: 0 | COMMUNITY
Start: 2018-09-19 | End: 2020-09-30

## 2018-10-19 NOTE — H&P
CC: Annual Physical Exam    HPI:   Delma Sequeira is a 41year old female who presents for a complete physical exam. Symptoms: periods are regular. Patient complains of nothing but would like to understand her heart issues. Has appt with Dr. Jc Jarvis soon. Screening for other and unspecified genitourinary condition    • Screening for thyroid disorder    • Unspecified sinusitis (chronic)       Past Surgical History:   Procedure Laterality Date   • FOOT/TOES SURGERY PROC UNLISTED        Family History   Proble good BS's,no masses, HSM or tenderness  :introitus is normal,scant discharge,cervix is pink,no adnexal masses or tenderness, PAP was done   MUSCULOSKELETAL: back is not tender,FROM of the back  EXTREMITIES: no cyanosis, clubbing or edema  NEURO: Oriented encounter       Imaging & Consults:  OP REFERRAL TO WEIGHT LOSS CLINIC  FLULAVAL INFLUENZA VACCINE QUAD PRESERVATIVE FREE 0.5 ML  [facility] TORRI 2D+3D SCREENING BILAT (CPT=77067/27687)  US THYROID (CMS=63834)

## 2018-10-21 ENCOUNTER — APPOINTMENT (OUTPATIENT)
Dept: ULTRASOUND IMAGING | Age: 43
End: 2018-10-21
Attending: PHYSICIAN ASSISTANT
Payer: COMMERCIAL

## 2018-10-21 ENCOUNTER — HOSPITAL ENCOUNTER (OUTPATIENT)
Age: 43
Discharge: HOME | End: 2018-10-21
Payer: COMMERCIAL

## 2018-10-21 VITALS
SYSTOLIC BLOOD PRESSURE: 161 MMHG | HEART RATE: 66 BPM | DIASTOLIC BLOOD PRESSURE: 111 MMHG | BODY MASS INDEX: 42.33 KG/M2 | WEIGHT: 230 LBS | OXYGEN SATURATION: 100 % | HEIGHT: 62 IN | RESPIRATION RATE: 18 BRPM | TEMPERATURE: 98 F

## 2018-10-21 DIAGNOSIS — M79.662 PAIN OF LEFT CALF: Primary | ICD-10-CM

## 2018-10-21 PROCEDURE — 99214 OFFICE O/P EST MOD 30 MIN: CPT

## 2018-10-21 PROCEDURE — 93971 EXTREMITY STUDY: CPT | Performed by: PHYSICIAN ASSISTANT

## 2018-10-21 NOTE — ED PROVIDER NOTES
Patient Seen in: [facility]    History   Patient presents with:  Leg Pain    Stated Complaint: CALF PAIN     HPI     Patient is a pleasant 44-year-old female.   Patient has a medical history of recent self-limited A. Fib; patient above.    Physical Exam     ED Triage Vitals [10/21/18 7480]   BP (!) 161/113   Pulse 69   Resp 18   Temp 97.4 °F (36.3 °C)   Temp src Oral   SpO2 100 %   O2 Device None (Room air)       Current:BP (!) 161/111   Pulse 66   Temp 97.9 °F (36.6 °C) (Oral)   R No reflux. THROMBI:  None visible. COMPRESSION:  Normal compressibility, phasicity, and augmentation. OTHER:  Negative. CONCLUSION:  No DVT.      Dictated by: Susy Odonnell MD on 10/21/2018 at 11:02     Approved by: Susy Odonnell MD

## 2018-10-23 ENCOUNTER — PRIOR ORIGINAL RECORDS (OUTPATIENT)
Dept: OTHER | Age: 43
End: 2018-10-23

## 2018-10-23 ENCOUNTER — APPOINTMENT (OUTPATIENT)
Dept: LAB | Age: 43
End: 2018-10-23
Attending: FAMILY MEDICINE
Payer: COMMERCIAL

## 2018-10-23 DIAGNOSIS — E55.9 VITAMIN D DEFICIENCY: ICD-10-CM

## 2018-10-23 DIAGNOSIS — Z00.00 LABORATORY EXAMINATION ORDERED AS PART OF A ROUTINE GENERAL MEDICAL EXAMINATION: ICD-10-CM

## 2018-10-23 PROCEDURE — 82306 VITAMIN D 25 HYDROXY: CPT

## 2018-10-23 PROCEDURE — 36415 COLL VENOUS BLD VENIPUNCTURE: CPT

## 2018-10-23 PROCEDURE — 87389 HIV-1 AG W/HIV-1&-2 AB AG IA: CPT

## 2018-10-23 PROCEDURE — 80061 LIPID PANEL: CPT

## 2018-10-25 RX ORDER — ERGOCALCIFEROL 1.25 MG/1
50000 CAPSULE ORAL WEEKLY
Qty: 12 CAPSULE | Refills: 0 | Status: SHIPPED | OUTPATIENT
Start: 2018-10-25 | End: 2019-04-29

## 2018-10-29 ENCOUNTER — PRIOR ORIGINAL RECORDS (OUTPATIENT)
Dept: OTHER | Age: 43
End: 2018-10-29

## 2018-10-29 ENCOUNTER — MYAURORA ACCOUNT LINK (OUTPATIENT)
Dept: OTHER | Age: 43
End: 2018-10-29

## 2018-11-01 ENCOUNTER — OFFICE VISIT (OUTPATIENT)
Dept: FAMILY MEDICINE CLINIC | Facility: CLINIC | Age: 43
End: 2018-11-01

## 2018-11-01 VITALS
SYSTOLIC BLOOD PRESSURE: 110 MMHG | RESPIRATION RATE: 15 BRPM | WEIGHT: 233 LBS | HEART RATE: 70 BPM | DIASTOLIC BLOOD PRESSURE: 60 MMHG | HEIGHT: 61 IN | BODY MASS INDEX: 43.99 KG/M2

## 2018-11-01 DIAGNOSIS — M25.562 ACUTE PAIN OF LEFT KNEE: Primary | ICD-10-CM

## 2018-11-01 DIAGNOSIS — G47.10 HYPERSOMNOLENCE: ICD-10-CM

## 2018-11-01 DIAGNOSIS — I49.1 PAC (PREMATURE ATRIAL CONTRACTION): ICD-10-CM

## 2018-11-01 DIAGNOSIS — I49.3 PVC (PREMATURE VENTRICULAR CONTRACTION): ICD-10-CM

## 2018-11-01 DIAGNOSIS — E66.01 MORBID OBESITY (HCC): ICD-10-CM

## 2018-11-01 DIAGNOSIS — R00.2 PALPITATIONS: ICD-10-CM

## 2018-11-01 PROCEDURE — 99214 OFFICE O/P EST MOD 30 MIN: CPT | Performed by: FAMILY MEDICINE

## 2018-11-01 RX ORDER — IBUPROFEN 600 MG/1
600 TABLET ORAL EVERY 6 HOURS PRN
COMMUNITY
End: 2019-10-23

## 2018-11-01 NOTE — PROGRESS NOTES
Nish Meza is a 41year old female. HPI:   Patient complains of a tender spot on her left knee since October 18. Patient denies any trauma or injury to the knee. Patient did have an x-ray of her left knee and August 2015.   Patient did see the cardio and unspecified genitourinary condition    • Screening for thyroid disorder    • Unspecified sinusitis (chronic)       Social History:  Social History    Tobacco Use      Smoking status: Never Smoker      Smokeless tobacco: Never Used    Alcohol use:  No contraction)  Pvc (premature ventricular contraction)    No orders of the defined types were placed in this encounter.       Meds & Refills for this Visit:  Requested Prescriptions      No prescriptions requested or ordered in this encounter       Imaging &

## 2018-11-14 ENCOUNTER — HOSPITAL ENCOUNTER (OUTPATIENT)
Dept: GENERAL RADIOLOGY | Age: 43
Discharge: HOME | End: 2018-11-14
Attending: FAMILY MEDICINE
Payer: COMMERCIAL

## 2018-11-14 ENCOUNTER — OFFICE VISIT (OUTPATIENT)
Dept: FAMILY MEDICINE CLINIC | Facility: CLINIC | Age: 43
End: 2018-11-14

## 2018-11-14 VITALS
SYSTOLIC BLOOD PRESSURE: 122 MMHG | HEART RATE: 61 BPM | RESPIRATION RATE: 14 BRPM | BODY MASS INDEX: 43.99 KG/M2 | OXYGEN SATURATION: 100 % | DIASTOLIC BLOOD PRESSURE: 74 MMHG | WEIGHT: 233 LBS | TEMPERATURE: 98 F | HEIGHT: 61 IN

## 2018-11-14 DIAGNOSIS — M79.605 LEFT LEG PAIN: ICD-10-CM

## 2018-11-14 DIAGNOSIS — T14.8XXA MUSCLE STRAIN: ICD-10-CM

## 2018-11-14 DIAGNOSIS — M25.562 ACUTE PAIN OF LEFT KNEE: Primary | ICD-10-CM

## 2018-11-14 DIAGNOSIS — M25.562 ACUTE PAIN OF LEFT KNEE: ICD-10-CM

## 2018-11-14 PROCEDURE — 73560 X-RAY EXAM OF KNEE 1 OR 2: CPT | Performed by: FAMILY MEDICINE

## 2018-11-14 PROCEDURE — 99213 OFFICE O/P EST LOW 20 MIN: CPT | Performed by: FAMILY MEDICINE

## 2018-11-14 NOTE — PROGRESS NOTES
Sheena Opitz is a 41year old female. HPI:   Patient complains of left knee pain after working out on Wednesday last week. She noted the pain on the inside posterior aspect of her left knee. She states that she rested and felt much better on Saturday. 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 Advised physical therapy. Advised x-ray of left knee. Advised Advil 800 mg p.o. 3 times daily as needed.   Advised ice as needed to

## 2018-11-28 ENCOUNTER — MED REC SCAN ONLY (OUTPATIENT)
Dept: FAMILY MEDICINE CLINIC | Facility: CLINIC | Age: 43
End: 2018-11-28

## 2018-12-03 ENCOUNTER — HOSPITAL ENCOUNTER (OUTPATIENT)
Dept: PHYSICAL THERAPY | Facility: HOSPITAL | Age: 43
Setting detail: THERAPIES SERIES
Discharge: HOME | End: 2018-12-03
Attending: FAMILY MEDICINE
Payer: COMMERCIAL

## 2018-12-03 ENCOUNTER — PATIENT MESSAGE (OUTPATIENT)
Dept: FAMILY MEDICINE CLINIC | Facility: CLINIC | Age: 43
End: 2018-12-03

## 2018-12-03 DIAGNOSIS — M79.605 LEFT LEG PAIN: ICD-10-CM

## 2018-12-03 DIAGNOSIS — T14.8XXA MUSCLE STRAIN: ICD-10-CM

## 2018-12-03 DIAGNOSIS — M25.562 ACUTE PAIN OF LEFT KNEE: ICD-10-CM

## 2018-12-03 PROCEDURE — 97161 PT EVAL LOW COMPLEX 20 MIN: CPT

## 2018-12-03 PROCEDURE — 97110 THERAPEUTIC EXERCISES: CPT

## 2018-12-03 NOTE — PROGRESS NOTES
LOWER EXTREMITY EVALUATION:   Referring Physician: Dr. Polina Priest  Diagnosis: Muscle strain (T14.8XXA)  Acute pain of left knee (M25.562)  Left leg pain (M79.605)     Date of Service: 12/3/2018     PATIENT SUMMARY   Dominguez Amado is a 41year old y/o female most consistent with patellofemoral pain with possible patellar tendon involvement. Montana Breen would benefit from skilled Physical Therapy to address the above impairments to facilitate decreased pain and improved participation with daily activities.     Angela Velasquez to perform stepping and squatting activities without excessive femoral IR/ADD (8 visits)  · Pt will improve left quadriceps flexibility for decreased patellar compression (8 visits)  · Pt will be independent and compliant with comprehensive HEP to maintain

## 2018-12-04 NOTE — TELEPHONE ENCOUNTER
Duexis ibuprofen combined with famotidine. Patient may take famotidine in combination with her ibuprofen over-the-counter and probably it will be much cheaper than this prescription. We can fill her ibuprofen.

## 2018-12-04 NOTE — TELEPHONE ENCOUNTER
From: Cassius Griffin DO  Sent: 12/3/2018 9:27 PM CST  Subject: Prescription Question    I would like to know if I can switch to Duexis because I have read that this particular medication is safer for my liver and kidneys?  If approved, I

## 2018-12-05 ENCOUNTER — TELEPHONE (OUTPATIENT)
Dept: FAMILY MEDICINE CLINIC | Facility: CLINIC | Age: 43
End: 2018-12-05

## 2018-12-05 ENCOUNTER — HOSPITAL ENCOUNTER (OUTPATIENT)
Dept: PHYSICAL THERAPY | Facility: HOSPITAL | Age: 43
Setting detail: THERAPIES SERIES
Discharge: HOME | End: 2018-12-05
Attending: FAMILY MEDICINE
Payer: COMMERCIAL

## 2018-12-05 PROCEDURE — 97110 THERAPEUTIC EXERCISES: CPT

## 2018-12-05 PROCEDURE — 97035 APP MDLTY 1+ULTRASOUND EA 15: CPT

## 2018-12-05 RX ORDER — DICLOFENAC SODIUM 75 MG/1
75 TABLET, DELAYED RELEASE ORAL 2 TIMES DAILY
Qty: 60 TABLET | Refills: 0 | Status: SHIPPED | OUTPATIENT
Start: 2018-12-05 | End: 2019-09-28

## 2018-12-05 NOTE — PROGRESS NOTES
Dx: Muscle strain (T14.8XXA)  Acute pain of left knee (M25.562)  Left leg pain (M79.605)           Authorized # of Visits:  8         Next MD visit: none scheduled  Fall Risk: standard         Precautions: n/a             Subjective: No changes in L knee. SLP 1 black and 1 grey cords 2 x 10          T board calf stretch         SLB on ground         SLB on Airex         SB bridges  Clam shells         Skilled Services: pt education, manual therapy    Charges: US x 1 8', there ex x 2 32'    Total Timed Castillo

## 2018-12-05 NOTE — TELEPHONE ENCOUNTER
Pt said her knee is really bothering her and wants to know instead of ibuprofen if dr PHELPS can prescribe DICLOFENAC OR DUEXIS TO HER PHARMACY

## 2018-12-05 NOTE — TELEPHONE ENCOUNTER
I sent a prescription for diclofenac to the pharmacy. I did make a comment for the patient to take the medication with an H2 blocker to protect her stomach.

## 2018-12-06 ENCOUNTER — OFFICE VISIT (OUTPATIENT)
Dept: INTERNAL MEDICINE CLINIC | Facility: CLINIC | Age: 43
End: 2018-12-06

## 2018-12-06 VITALS
SYSTOLIC BLOOD PRESSURE: 120 MMHG | WEIGHT: 235 LBS | HEART RATE: 84 BPM | HEIGHT: 61.5 IN | RESPIRATION RATE: 16 BRPM | BODY MASS INDEX: 43.8 KG/M2 | DIASTOLIC BLOOD PRESSURE: 80 MMHG

## 2018-12-06 DIAGNOSIS — Z51.81 ENCOUNTER FOR THERAPEUTIC DRUG MONITORING: Primary | ICD-10-CM

## 2018-12-06 DIAGNOSIS — E66.01 MORBID OBESITY WITH BMI OF 40.0-44.9, ADULT (HCC): ICD-10-CM

## 2018-12-06 DIAGNOSIS — E55.9 VITAMIN D DEFICIENCY: ICD-10-CM

## 2018-12-06 DIAGNOSIS — G43.109 MIGRAINE WITH AURA AND WITHOUT STATUS MIGRAINOSUS, NOT INTRACTABLE: ICD-10-CM

## 2018-12-06 PROCEDURE — 99214 OFFICE O/P EST MOD 30 MIN: CPT | Performed by: NURSE PRACTITIONER

## 2018-12-06 NOTE — PROGRESS NOTES
HISTORY OF PRESENT ILLNESS  Patient presents with:  Weight Problem: patient referred by Dr Mick Pollard is a 41year old female new to our office today for initiation of medical weight loss program.  Patient presents today with c/o excess we negative  Thyroid disease: negative  Constipation: negative  Other pertinent hx: none  DVT: negative   Family or personal history of Pancreatic issues / Medullary Thyroid Cancer: negative  History of bariatric surgery: negative    [address] reviewed: obesity in  (H) 09/09/2018    BUN 15 09/09/2018    BUNCREA 19.7 09/09/2018    CREATSERUM 0.76 09/09/2018    ANIONGAP 5 09/09/2018     10/24/2017    GFRNAA 98 09/09/2018    GFRAA 113 09/09/2018    CA 8.4 09/09/2018    OSMOCALC 289 09/09/2018    ALKPHO 94 visit:    Encounter for therapeutic drug monitoring  - reviewed EMR hx  -     HEMOGLOBIN A1C; Future  -     LEPTIN, SERUM; Future  -     VITAMIN B12; Future  -     Topiramate ER (TROKENDI XR) 25 MG Oral Capsule SR 24 Hr; Take 1 capsule by mouth daily.     Wanda Melchor office visit. 3.  Fill your prescribed medication and take as discussed and prescribed:  Start Trokendi XR 25 mg daily in AM, can increase to 50 mg daily after 1 week. If so, please contact me to let me know so I can adjust your script before next visit. commitment. This Justin can also help work on behavior change and improve sleep. Check out [email or web address] blog for continued daily support and education along this weight loss journey!                 Return in about 1 month (around 1/6/2019) for

## 2018-12-06 NOTE — PATIENT INSTRUCTIONS
Welcome to the [facility]...your Lifestyle Renovation begins now! Thank you for placing your trust in our health care team, I look forward to working with you along this journey to better health!     Next steps:     1.  Sched start to mindful eating! Continue or start exercising to help establish a routine. If not already exercising begin with 1 day and progress as able with long-term goal of 30 minutes 5 days a week at a minimum.     Meditation daily can help manage and cont

## 2018-12-10 ENCOUNTER — HOSPITAL ENCOUNTER (OUTPATIENT)
Dept: PHYSICAL THERAPY | Facility: HOSPITAL | Age: 43
Setting detail: THERAPIES SERIES
Discharge: HOME | End: 2018-12-10
Attending: FAMILY MEDICINE
Payer: COMMERCIAL

## 2018-12-10 PROCEDURE — 97035 APP MDLTY 1+ULTRASOUND EA 15: CPT

## 2018-12-10 PROCEDURE — 97110 THERAPEUTIC EXERCISES: CPT

## 2018-12-10 PROCEDURE — 97140 MANUAL THERAPY 1/> REGIONS: CPT

## 2018-12-10 NOTE — PROGRESS NOTES
Dx: Muscle strain (T14.8XXA)  Acute pain of left knee (M25.562)  Left leg pain (M79.605)           Authorized # of Visits:  8         Next MD visit: none scheduled  Fall Risk: standard         Precautions: n/a             Subjective: Feels more stiffness i TX#: 6/ Date:               TX#: 7/ Date:               TX#: 8/   US 3MHz 8' inferior pole of patella 1.2 w/cm sq 8'        STM L anterior knee and peripatellar 5'' 7'        (B) hamstring, quad, calf stretching (B)        DLP 2 black and 1 grey

## 2018-12-13 RX ORDER — OMEPRAZOLE 40 MG/1
CAPSULE, DELAYED RELEASE ORAL
Qty: 90 CAPSULE | Refills: 0 | Status: SHIPPED | OUTPATIENT
Start: 2018-12-13 | End: 2019-03-21

## 2018-12-14 ENCOUNTER — APPOINTMENT (OUTPATIENT)
Dept: PHYSICAL THERAPY | Facility: HOSPITAL | Age: 43
End: 2018-12-14
Attending: FAMILY MEDICINE
Payer: COMMERCIAL

## 2018-12-17 ENCOUNTER — HOSPITAL ENCOUNTER (OUTPATIENT)
Dept: PHYSICAL THERAPY | Facility: HOSPITAL | Age: 43
Setting detail: THERAPIES SERIES
Discharge: HOME | End: 2018-12-17
Attending: FAMILY MEDICINE
Payer: COMMERCIAL

## 2018-12-17 PROCEDURE — 97140 MANUAL THERAPY 1/> REGIONS: CPT

## 2018-12-17 PROCEDURE — 97110 THERAPEUTIC EXERCISES: CPT

## 2018-12-17 NOTE — PROGRESS NOTES
Dx: Muscle strain (T14.8XXA)  Acute pain of left knee (M25.562)  Left leg pain (M79.605)           Authorized # of Visits:  8         Next MD visit: none scheduled  Fall Risk: standard         Precautions: n/a             Subjective: L knee feels pretty go DLP 2 black and 1 grey cords 3 x 10 2 x 10   3 black cords  3 x 10, 60 - 0 deg       SLP 1 black and 1 grey cords 2 x 10  2 x 10 2 black cords, 60 to 0 (B)       T board calf stretch T board calf stretch Clam shells RTB 3 x 10, L       SLB on ground

## 2018-12-19 ENCOUNTER — HOSPITAL ENCOUNTER (OUTPATIENT)
Dept: PHYSICAL THERAPY | Facility: HOSPITAL | Age: 43
Setting detail: THERAPIES SERIES
Discharge: HOME | End: 2018-12-19
Attending: FAMILY MEDICINE
Payer: COMMERCIAL

## 2018-12-19 PROCEDURE — 97140 MANUAL THERAPY 1/> REGIONS: CPT

## 2018-12-19 PROCEDURE — 97110 THERAPEUTIC EXERCISES: CPT

## 2018-12-19 NOTE — PROGRESS NOTES
Dx: Muscle strain (T14.8XXA)  Acute pain of left knee (M25.562)  Left leg pain (M79.605)           Authorized # of Visits:  8         Next MD visit: none scheduled  Fall Risk: standard         Precautions: n/a             Subjective: L knee felt good since x 10       DLP 2 black and 1 grey cords 3 x 10 2 x 10   3 black cords  3 x 10, 60 - 0 deg 3 black cords 90-0  3 x 10       SLP 1 black and 1 grey cords 2 x 10  2 x 10 2 black cords, 60 to 0 (B) 2 black cords 90 to 0  3 x 10       T board calf stretch T boa

## 2018-12-21 ENCOUNTER — OFFICE VISIT (OUTPATIENT)
Dept: SLEEP CENTER | Facility: HOSPITAL | Age: 43
End: 2018-12-21
Attending: FAMILY MEDICINE
Payer: COMMERCIAL

## 2018-12-21 DIAGNOSIS — G47.10 HYPERSOMNOLENCE: ICD-10-CM

## 2018-12-21 DIAGNOSIS — E66.01 MORBID OBESITY (HCC): ICD-10-CM

## 2018-12-21 DIAGNOSIS — R00.2 PALPITATIONS: ICD-10-CM

## 2018-12-21 PROCEDURE — 95810 POLYSOM 6/> YRS 4/> PARAM: CPT

## 2018-12-26 NOTE — PROCEDURES
[address]       Accredited by the [facility] (AASM)    PATIENT'S NAME:        Stuartholden Adamson  ATTENDING PHYSICIAN:   Martín Wong M.D.   REFERRING PHYSICIAN:   Jenny Santizo D.O.  KEAGAN saturation via continuous pulse oximetry. In accordance with AASM recommendations, hypopnea events are scored based on an oxygen saturation more than or equal to 4 percent. Body position is documented via technician notes every 15 minutes.      SLEEP ARCH sleep before and after that episode was quite condensed and normal.     RECOMMENDATIONS:  The patient will be following with [facility] to discuss results of polysomnography along with her complaints of increased fatigue and occ

## 2018-12-28 ENCOUNTER — TELEPHONE (OUTPATIENT)
Dept: FAMILY MEDICINE CLINIC | Facility: CLINIC | Age: 43
End: 2018-12-28

## 2018-12-28 DIAGNOSIS — M25.562 ACUTE PAIN OF LEFT KNEE: ICD-10-CM

## 2018-12-28 DIAGNOSIS — M79.605 LEFT LEG PAIN: ICD-10-CM

## 2018-12-28 DIAGNOSIS — T14.8XXA MUSCLE STRAIN: Primary | ICD-10-CM

## 2018-12-28 NOTE — TELEPHONE ENCOUNTER
Betsy Burton PT from [facility] calling. Needs new referral order for pt's PT as they will be using visits from orig order (11/14) into 2019    I have placed order for pt.

## 2018-12-31 ENCOUNTER — APPOINTMENT (OUTPATIENT)
Dept: PHYSICAL THERAPY | Facility: HOSPITAL | Age: 43
End: 2018-12-31
Attending: FAMILY MEDICINE
Payer: COMMERCIAL

## 2019-01-02 ENCOUNTER — TELEPHONE (OUTPATIENT)
Dept: INTERNAL MEDICINE CLINIC | Facility: CLINIC | Age: 44
End: 2019-01-02

## 2019-01-02 DIAGNOSIS — G47.30 SLEEP HYPOPNEA: ICD-10-CM

## 2019-01-02 DIAGNOSIS — Z91.89 AT RISK FOR SLEEP APNEA: Primary | ICD-10-CM

## 2019-01-09 ENCOUNTER — TELEPHONE (OUTPATIENT)
Dept: FAMILY MEDICINE CLINIC | Facility: CLINIC | Age: 44
End: 2019-01-09

## 2019-01-09 ENCOUNTER — APPOINTMENT (OUTPATIENT)
Dept: PHYSICAL THERAPY | Facility: HOSPITAL | Age: 44
End: 2019-01-09
Attending: FAMILY MEDICINE
Payer: COMMERCIAL

## 2019-01-09 NOTE — TELEPHONE ENCOUNTER
Called s/w pt advised she whould need to see Dr. Chet Liu prior to getting a referral as clinical notes are needed. Pt requested to cancel request as she is better now.

## 2019-01-09 NOTE — TELEPHONE ENCOUNTER
.Reason for the order/referral: has object in (L) eye   PCP:  Dr. Daniel Sol   Refer to Provider (first and last name): Dr. Helms Counter   Specialty: Opthamology   Patient Insurance: Payor: P /GARRETT / Plan: Cheo Ambrocio / Product Type: HMO /   Duration

## 2019-01-14 ENCOUNTER — APPOINTMENT (OUTPATIENT)
Dept: PHYSICAL THERAPY | Facility: HOSPITAL | Age: 44
End: 2019-01-14
Attending: FAMILY MEDICINE
Payer: COMMERCIAL

## 2019-02-07 ENCOUNTER — OFFICE VISIT (OUTPATIENT)
Dept: INTERNAL MEDICINE CLINIC | Facility: CLINIC | Age: 44
End: 2019-02-07

## 2019-02-07 VITALS
HEART RATE: 78 BPM | SYSTOLIC BLOOD PRESSURE: 120 MMHG | RESPIRATION RATE: 14 BRPM | BODY MASS INDEX: 43.8 KG/M2 | HEIGHT: 61.5 IN | DIASTOLIC BLOOD PRESSURE: 80 MMHG | WEIGHT: 235 LBS

## 2019-02-07 DIAGNOSIS — Z51.81 ENCOUNTER FOR THERAPEUTIC DRUG MONITORING: Primary | ICD-10-CM

## 2019-02-07 DIAGNOSIS — E66.01 MORBID OBESITY WITH BMI OF 40.0-44.9, ADULT (HCC): ICD-10-CM

## 2019-02-07 DIAGNOSIS — G43.109 MIGRAINE WITH AURA AND WITHOUT STATUS MIGRAINOSUS, NOT INTRACTABLE: ICD-10-CM

## 2019-02-07 PROCEDURE — 99213 OFFICE O/P EST LOW 20 MIN: CPT | Performed by: NURSE PRACTITIONER

## 2019-02-07 NOTE — PATIENT INSTRUCTIONS
Continue making lifestyle changes that focus on good nutrition, regular exercise and stress management. Today we reviewed your labs with findings of: n/a- please complete fasting (8 hours, water only) labs prior to next visit.     Medication Plan: Aisha Armando eating. We often let the sight of food tempt us when we are above a level 6 on the scale. Before you indulge, take a step back and think about how you feel. Did you just eat a few minutes ago?  Are you eating in response to an emotion or because you are ex

## 2019-02-07 NOTE — PROGRESS NOTES
Luisa Dean is a 41year old female presents today for 1 month follow-up on medical weight loss program for the treatment of overweight, obesity, or morbid obesity.     S:  Current weight Wt Readings from Last 6 Encounters:  02/07/19 : 235 lb  12/06/18 : encounter diagnosis)  Morbid obesity with bmi of 40.0-44.9, adult (hcc)  Migraine with aura and without status migrainosus, not intractable    No orders of the defined types were placed in this encounter.       Meds & Refills for this Visit:  Requested Pres signals that tell us it's time to eat (other than a rumbling stomach): television ads, social events, smells from the food court, and the candy bowl at the office.  These factors in the environment trigger our senses and other mental processes that make us Stuffed to the point of feeling sick   9 = Very uncomfortably full, need to loosen your belt    8 = Uncomfortably full, feel stuffed    7 = Very full, feel as if you have overeaten    6 = Comfortably full, satisfied    Neutral - 5    5 = Comfortable, neith

## 2019-02-28 VITALS
HEART RATE: 75 BPM | SYSTOLIC BLOOD PRESSURE: 110 MMHG | DIASTOLIC BLOOD PRESSURE: 80 MMHG | BODY MASS INDEX: 44.37 KG/M2 | HEIGHT: 61 IN | WEIGHT: 235 LBS

## 2019-02-28 VITALS
WEIGHT: 233 LBS | BODY MASS INDEX: 43.99 KG/M2 | DIASTOLIC BLOOD PRESSURE: 66 MMHG | HEART RATE: 74 BPM | SYSTOLIC BLOOD PRESSURE: 104 MMHG | HEIGHT: 61 IN

## 2019-02-28 RX ORDER — METOPROLOL SUCCINATE 25 MG/1
TABLET, EXTENDED RELEASE ORAL
COMMUNITY
Start: 2018-10-29 | End: 2019-03-06 | Stop reason: SDUPTHER

## 2019-02-28 RX ORDER — ASPIRIN 325 MG
TABLET ORAL
COMMUNITY
End: 2020-10-26

## 2019-02-28 RX ORDER — TOPIRAMATE 50 MG/1
TABLET, FILM COATED ORAL
COMMUNITY
Start: 2018-09-19

## 2019-02-28 RX ORDER — OMEPRAZOLE 40 MG/1
CAPSULE, DELAYED RELEASE ORAL
COMMUNITY
Start: 2018-09-19

## 2019-02-28 RX ORDER — CALCIUM CARBONATE 300MG(750)
TABLET,CHEWABLE ORAL
COMMUNITY
Start: 2018-10-29

## 2019-03-06 ENCOUNTER — OFFICE VISIT (OUTPATIENT)
Dept: CARDIOLOGY | Age: 44
End: 2019-03-06

## 2019-03-06 VITALS
SYSTOLIC BLOOD PRESSURE: 120 MMHG | HEIGHT: 61 IN | OXYGEN SATURATION: 97 % | HEART RATE: 71 BPM | WEIGHT: 234 LBS | DIASTOLIC BLOOD PRESSURE: 70 MMHG | BODY MASS INDEX: 44.18 KG/M2

## 2019-03-06 DIAGNOSIS — I48.0 PAROXYSMAL ATRIAL FIBRILLATION (CMD): Primary | ICD-10-CM

## 2019-03-06 PROCEDURE — 99213 OFFICE O/P EST LOW 20 MIN: CPT | Performed by: INTERNAL MEDICINE

## 2019-03-06 RX ORDER — METOPROLOL SUCCINATE 25 MG/1
25 TABLET, EXTENDED RELEASE ORAL DAILY
Qty: 30 TABLET | Refills: 3 | Status: SHIPPED | OUTPATIENT
Start: 2019-03-06 | End: 2019-08-13 | Stop reason: SDUPTHER

## 2019-03-16 ENCOUNTER — HOSPITAL ENCOUNTER (OUTPATIENT)
Age: 44
Discharge: HOME | End: 2019-03-16
Payer: COMMERCIAL

## 2019-03-16 VITALS
HEIGHT: 61 IN | RESPIRATION RATE: 18 BRPM | TEMPERATURE: 98 F | HEART RATE: 78 BPM | DIASTOLIC BLOOD PRESSURE: 82 MMHG | BODY MASS INDEX: 42.48 KG/M2 | SYSTOLIC BLOOD PRESSURE: 127 MMHG | WEIGHT: 225 LBS | OXYGEN SATURATION: 100 %

## 2019-03-16 DIAGNOSIS — N30.90 CYSTITIS: Primary | ICD-10-CM

## 2019-03-16 LAB
POCT BILIRUBIN URINE: NEGATIVE
POCT GLUCOSE URINE: NEGATIVE MG/DL
POCT KETONE URINE: NEGATIVE MG/DL
POCT NITRITE URINE: NEGATIVE
POCT PH URINE: 8 (ref 5–8)
POCT SPECIFIC GRAVITY URINE: 1.02
POCT URINE CLARITY: CLEAR
POCT URINE COLOR: YELLOW
POCT UROBILINOGEN URINE: 0.2 MG/DL

## 2019-03-16 PROCEDURE — 87186 SC STD MICRODIL/AGAR DIL: CPT | Performed by: PHYSICIAN ASSISTANT

## 2019-03-16 PROCEDURE — 87086 URINE CULTURE/COLONY COUNT: CPT | Performed by: PHYSICIAN ASSISTANT

## 2019-03-16 PROCEDURE — 87088 URINE BACTERIA CULTURE: CPT | Performed by: PHYSICIAN ASSISTANT

## 2019-03-16 PROCEDURE — 99214 OFFICE O/P EST MOD 30 MIN: CPT

## 2019-03-16 PROCEDURE — 81002 URINALYSIS NONAUTO W/O SCOPE: CPT | Performed by: PHYSICIAN ASSISTANT

## 2019-03-16 RX ORDER — SULFAMETHOXAZOLE AND TRIMETHOPRIM 800; 160 MG/1; MG/1
1 TABLET ORAL 2 TIMES DAILY
Qty: 6 TABLET | Refills: 0 | Status: SHIPPED | OUTPATIENT
Start: 2019-03-16 | End: 2019-03-19

## 2019-03-16 NOTE — ED PROVIDER NOTES
Patient Seen in: [facility] END    History   Patient presents with:  Urinary Symptoms (urologic)    Stated Complaint: UTI    HPI    Patient is a very pleasant 37-year-old female.   For the past 48 hours she has had dysuria and urinar Exam      Gen: Well appearing, well groomed, alert and aware x 3  Neck: Supple, full range of motion, no thyromegaly or lymphadenopathy.   Eye examination: EOMs are intact, normal conjunctival  ENT: Atraumatic  Lung: No distress, RR, no retraction, breath s

## 2019-03-22 RX ORDER — OMEPRAZOLE 40 MG/1
40 CAPSULE, DELAYED RELEASE ORAL DAILY
Qty: 90 CAPSULE | Refills: 0 | Status: SHIPPED | OUTPATIENT
Start: 2019-03-22 | End: 2019-10-23

## 2019-04-25 ENCOUNTER — TELEPHONE (OUTPATIENT)
Dept: FAMILY MEDICINE CLINIC | Facility: CLINIC | Age: 44
End: 2019-04-25

## 2019-04-25 NOTE — TELEPHONE ENCOUNTER
Reported the sx of numbnessss of fingers, arms and foot has been on and off for the last 2 days    Denies arm weakness, facial droop, slurring, confusion, h/a, vision changes     Advised to go to call 911 if with above sx      With verbalized understanding

## 2019-04-25 NOTE — TELEPHONE ENCOUNTER
1. What are your symptoms? Numbness/cold feeling in both arms. Also foot and toes sometimes. One finger completely numb one day this week        2. How long have you been having these symptoms? 2 days        3.  Have you done anything already to shantanu

## 2019-04-29 ENCOUNTER — APPOINTMENT (OUTPATIENT)
Dept: LAB | Age: 44
End: 2019-04-29
Attending: FAMILY MEDICINE
Payer: COMMERCIAL

## 2019-04-29 ENCOUNTER — OFFICE VISIT (OUTPATIENT)
Dept: FAMILY MEDICINE CLINIC | Facility: CLINIC | Age: 44
End: 2019-04-29

## 2019-04-29 VITALS
HEART RATE: 90 BPM | WEIGHT: 235 LBS | TEMPERATURE: 98 F | RESPIRATION RATE: 18 BRPM | DIASTOLIC BLOOD PRESSURE: 72 MMHG | SYSTOLIC BLOOD PRESSURE: 118 MMHG | BODY MASS INDEX: 44 KG/M2

## 2019-04-29 DIAGNOSIS — G56.03 BILATERAL CARPAL TUNNEL SYNDROME: ICD-10-CM

## 2019-04-29 DIAGNOSIS — E66.01 MORBID OBESITY WITH BMI OF 40.0-44.9, ADULT (HCC): ICD-10-CM

## 2019-04-29 DIAGNOSIS — R20.2 PARESTHESIA: Primary | ICD-10-CM

## 2019-04-29 DIAGNOSIS — E55.9 VITAMIN D DEFICIENCY: ICD-10-CM

## 2019-04-29 DIAGNOSIS — Z51.81 ENCOUNTER FOR THERAPEUTIC DRUG MONITORING: ICD-10-CM

## 2019-04-29 DIAGNOSIS — R20.2 PARESTHESIA: ICD-10-CM

## 2019-04-29 DIAGNOSIS — R74.8 ELEVATED ALKALINE PHOSPHATASE LEVEL: ICD-10-CM

## 2019-04-29 PROCEDURE — 82306 VITAMIN D 25 HYDROXY: CPT

## 2019-04-29 PROCEDURE — 83735 ASSAY OF MAGNESIUM: CPT

## 2019-04-29 PROCEDURE — 84075 ASSAY ALKALINE PHOSPHATASE: CPT

## 2019-04-29 PROCEDURE — 99213 OFFICE O/P EST LOW 20 MIN: CPT | Performed by: FAMILY MEDICINE

## 2019-04-29 PROCEDURE — 84080 ASSAY ALKALINE PHOSPHATASES: CPT

## 2019-04-29 PROCEDURE — 82607 VITAMIN B-12: CPT

## 2019-04-29 PROCEDURE — 82397 CHEMILUMINESCENT ASSAY: CPT

## 2019-04-29 PROCEDURE — 80053 COMPREHEN METABOLIC PANEL: CPT

## 2019-04-29 PROCEDURE — 83036 HEMOGLOBIN GLYCOSYLATED A1C: CPT

## 2019-04-29 PROCEDURE — 36415 COLL VENOUS BLD VENIPUNCTURE: CPT

## 2019-04-29 NOTE — PROGRESS NOTES
Rosalinda Wade is a 41year old female. HPI:   Pt. Complains of numbness and tingling in her hands, arms, feets, finger and toes on and off for the past 2 weeks. No triggers. Nothing makes it better or worse. It last seconds to less than 1 minute.   No History    Tobacco Use      Smoking status: Never Smoker      Smokeless tobacco: Never Used    Alcohol use: No      Alcohol/week: 0.0 oz    Drug use: No       Results for orders placed or performed during the hospital encounter of 03/16/19   POCT URINALYSI auscultation  CARDIO: RRR without murmur  GI: soft, good BS's  EXTREMITIES: no cyanosis, clubbing or edema;  Positive Phalen's --it gave the numbness and tingling all the way up through her arm on the right that she has been experiencing on both arms bilat

## 2019-04-30 DIAGNOSIS — E55.9 VITAMIN D DEFICIENCY: Primary | ICD-10-CM

## 2019-04-30 DIAGNOSIS — R74.8 ELEVATED ALKALINE PHOSPHATASE LEVEL: ICD-10-CM

## 2019-04-30 RX ORDER — ERGOCALCIFEROL 1.25 MG/1
50000 CAPSULE ORAL WEEKLY
Qty: 8 CAPSULE | Refills: 0 | Status: SHIPPED | OUTPATIENT
Start: 2019-04-30 | End: 2019-06-19

## 2019-05-12 ENCOUNTER — HOSPITAL ENCOUNTER (EMERGENCY)
Facility: HOSPITAL | Age: 44
Discharge: HOME | End: 2019-05-12
Attending: EMERGENCY MEDICINE
Payer: COMMERCIAL

## 2019-05-12 VITALS
DIASTOLIC BLOOD PRESSURE: 77 MMHG | TEMPERATURE: 99 F | HEIGHT: 61 IN | OXYGEN SATURATION: 96 % | WEIGHT: 235 LBS | HEART RATE: 67 BPM | BODY MASS INDEX: 44.37 KG/M2 | RESPIRATION RATE: 19 BRPM | SYSTOLIC BLOOD PRESSURE: 142 MMHG

## 2019-05-12 DIAGNOSIS — G43.809 OTHER MIGRAINE WITHOUT STATUS MIGRAINOSUS, NOT INTRACTABLE: ICD-10-CM

## 2019-05-12 DIAGNOSIS — R00.2 PALPITATIONS: Primary | ICD-10-CM

## 2019-05-12 PROCEDURE — 93005 ELECTROCARDIOGRAM TRACING: CPT

## 2019-05-12 PROCEDURE — 96374 THER/PROPH/DIAG INJ IV PUSH: CPT

## 2019-05-12 PROCEDURE — 93010 ELECTROCARDIOGRAM REPORT: CPT

## 2019-05-12 PROCEDURE — 96361 HYDRATE IV INFUSION ADD-ON: CPT

## 2019-05-12 PROCEDURE — 85025 COMPLETE CBC W/AUTO DIFF WBC: CPT | Performed by: EMERGENCY MEDICINE

## 2019-05-12 PROCEDURE — 96375 TX/PRO/DX INJ NEW DRUG ADDON: CPT

## 2019-05-12 PROCEDURE — 99285 EMERGENCY DEPT VISIT HI MDM: CPT

## 2019-05-12 PROCEDURE — 80053 COMPREHEN METABOLIC PANEL: CPT | Performed by: EMERGENCY MEDICINE

## 2019-05-12 RX ORDER — KETOROLAC TROMETHAMINE 30 MG/ML
15 INJECTION, SOLUTION INTRAMUSCULAR; INTRAVENOUS ONCE
Status: COMPLETED | OUTPATIENT
Start: 2019-05-12 | End: 2019-05-12

## 2019-05-12 RX ORDER — METOCLOPRAMIDE HYDROCHLORIDE 5 MG/ML
10 INJECTION INTRAMUSCULAR; INTRAVENOUS ONCE
Status: COMPLETED | OUTPATIENT
Start: 2019-05-12 | End: 2019-05-12

## 2019-05-12 NOTE — ED INITIAL ASSESSMENT (HPI)
Pt to ER c/o palpitation today. Pt states she was taking a nap today and it woke her up. + Nausea. Pt also reports migraine headache.

## 2019-05-13 NOTE — ED PROVIDER NOTES
Patient Seen in: [facility] Emergency Department    History   Patient presents with:  Arrythmia/Palpitations (cardiovascular)    Stated Complaint: palpitations, racing heart beat, woke pt from sleep.      HPI    19-year-old female presented to the ER Vitals [05/12/19 1910]   BP (!) 181/93   Pulse 68   Resp 16   Temp 98.5 °F (36.9 °C)   Temp src    SpO2 99 %   O2 Device None (Room air)       Current:/77   Pulse 69   Temp 98.5 °F (36.9 °C)   Resp 16   Ht 154.9 cm (5' 1\")   Wt 106.6 kg   LMP 05/03/ Status                     ---------                               -----------         ------                     CBC W/ DIFFERENTIAL[[id number]]          Abnormal            Final result                 Please view results for these tests on the individual

## 2019-06-13 DIAGNOSIS — Z51.81 ENCOUNTER FOR THERAPEUTIC DRUG MONITORING: ICD-10-CM

## 2019-06-13 DIAGNOSIS — E66.01 MORBID OBESITY WITH BMI OF 40.0-44.9, ADULT (HCC): ICD-10-CM

## 2019-06-13 DIAGNOSIS — G43.109 MIGRAINE WITH AURA AND WITHOUT STATUS MIGRAINOSUS, NOT INTRACTABLE: ICD-10-CM

## 2019-06-13 NOTE — TELEPHONE ENCOUNTER
Pt states she is not taking Trokendi ER as it is not working, pt want refill of Topiramate 50 mg oral tab one tab BID for migraines. Refill pended if appropriate.

## 2019-06-13 NOTE — TELEPHONE ENCOUNTER
Pt asking for refill to [facility] of her migraine maintenance med (Topomax?). [facility] told pt to call PCP b/c they do not have in on file. Per pt, it has been around 6 months since she's last gotten a refill.

## 2019-06-14 RX ORDER — TOPIRAMATE 50 MG/1
50 TABLET, FILM COATED ORAL 2 TIMES DAILY
Qty: 60 TABLET | Refills: 0 | Status: SHIPPED | OUTPATIENT
Start: 2019-06-14 | End: 2019-11-22

## 2019-06-19 ENCOUNTER — HOSPITAL ENCOUNTER (OUTPATIENT)
Dept: GENERAL RADIOLOGY | Age: 44
Discharge: HOME | End: 2019-06-19
Attending: FAMILY MEDICINE
Payer: COMMERCIAL

## 2019-06-19 ENCOUNTER — OFFICE VISIT (OUTPATIENT)
Dept: FAMILY MEDICINE CLINIC | Facility: CLINIC | Age: 44
End: 2019-06-19

## 2019-06-19 VITALS
HEART RATE: 88 BPM | SYSTOLIC BLOOD PRESSURE: 122 MMHG | WEIGHT: 235 LBS | BODY MASS INDEX: 43.8 KG/M2 | TEMPERATURE: 98 F | RESPIRATION RATE: 16 BRPM | HEIGHT: 61.5 IN | DIASTOLIC BLOOD PRESSURE: 80 MMHG

## 2019-06-19 DIAGNOSIS — M25.561 ACUTE PAIN OF RIGHT KNEE: Primary | ICD-10-CM

## 2019-06-19 DIAGNOSIS — M25.561 ACUTE PAIN OF RIGHT KNEE: ICD-10-CM

## 2019-06-19 PROCEDURE — 99213 OFFICE O/P EST LOW 20 MIN: CPT | Performed by: FAMILY MEDICINE

## 2019-06-19 NOTE — PROGRESS NOTES
Shireen Jasso is a 41year old female. HPI:   Pt. Complains of right knee pain for the past week. Patient noted a swelling on the distal medial aspect of her right knee. She did take some Tylenol today and currently there is no pain along the lump. Smokeless tobacco: Never Used    Alcohol use: No      Alcohol/week: 0.0 oz    Drug use: No       Results for orders placed or performed during the hospital encounter of 98/07/10   COMP METABOLIC PANEL (14)   Result Value Ref Range    Glucose 91 70 - 99 mg/ x10(3) uL    Lymphocyte Absolute 2.53 1.00 - 4.00 x10(3) uL    Monocyte Absolute 0.67 0.10 - 1.00 x10(3) uL    Eosinophil Absolute 0.10 0.00 - 0.70 x10(3) uL    Basophil Absolute 0.02 0.00 - 0.20 x10(3) uL    Immature Granulocyte Absolute 0.02 0.00 - 1.00

## 2019-07-29 ENCOUNTER — HOSPITAL ENCOUNTER (OUTPATIENT)
Age: 44
Discharge: HOME | End: 2019-07-29
Attending: FAMILY MEDICINE
Payer: COMMERCIAL

## 2019-07-29 VITALS
WEIGHT: 230 LBS | HEART RATE: 98 BPM | OXYGEN SATURATION: 100 % | SYSTOLIC BLOOD PRESSURE: 156 MMHG | RESPIRATION RATE: 16 BRPM | DIASTOLIC BLOOD PRESSURE: 94 MMHG | BODY MASS INDEX: 43 KG/M2 | TEMPERATURE: 98 F

## 2019-07-29 DIAGNOSIS — J01.90 ACUTE NON-RECURRENT SINUSITIS, UNSPECIFIED LOCATION: Primary | ICD-10-CM
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
## 2019-07-29 PROCEDURE — 99213 OFFICE O/P EST LOW 20 MIN: CPT

## 2019-07-29 PROCEDURE — 99214 OFFICE O/P EST MOD 30 MIN: CPT

## 2019-07-29 RX ORDER — FLUTICASONE PROPIONATE 50 MCG
2 SPRAY, SUSPENSION (ML) NASAL DAILY
Qty: 16 G | Refills: 0 | Status: SHIPPED | OUTPATIENT
Start: 2019-07-29

## 2019-07-29 RX ORDER — AMOXICILLIN AND CLAVULANATE POTASSIUM 875; 125 MG/1; MG/1
1 TABLET, FILM COATED ORAL 2 TIMES DAILY
Qty: 20 TABLET | Refills: 0 | Status: SHIPPED | OUTPATIENT
Start: 2019-07-29 | End: 2019-08-08

## 2019-07-29 RX ORDER — METHYLPREDNISOLONE 4 MG/1
TABLET ORAL
Qty: 1 PACKAGE | Refills: 0 | Status: SHIPPED | OUTPATIENT
Start: 2019-07-29 | End: 2019-08-14 | Stop reason: ALTCHOICE

## 2019-07-29 RX ORDER — FEXOFENADINE HCL 180 MG/1
180 TABLET ORAL
COMMUNITY
End: 2021-10-25

## 2019-07-29 NOTE — ED INITIAL ASSESSMENT (HPI)
Pt. States sinus congestion & drainage since Friday. No known fever. Was taking Aleve-D & Alkasetlzer sinus. Pt. Was in [facility] over the weekend, on Saturday noted some insect bites (Lt. Upper arm is the worst). Itching & some discomfort.  Pt. States her

## 2019-07-30 NOTE — ED PROVIDER NOTES
Patient Seen in: [facility]    History   Patient presents with:  Bite (integumentary): Lt. arm, Rt. lower leg & Rt. antecubital area  Sinus Problem    Stated Complaint: sinus congestion inscet bite swelling    HPI    38year-old above.    Physical Exam     ED Triage Vitals [07/29/19 1146]   BP (!) 158/100   Pulse 98   Resp 16   Temp 98 °F (36.7 °C)   Temp src Temporal   SpO2 100 %   O2 Device None (Room air)       Current:BP (!) 156/94   Pulse 98   Temp 98 °F (36.7 °C) (Temporal) pm    Follow-up:  [place] [address]  [phone]    In 2 days          Medications Prescribed:  Discharge Medication List as of 7/29/2019 12:22 PM    START taking these medications    Amoxicillin-Pot Clavulanat

## 2019-08-13 RX ORDER — METOPROLOL SUCCINATE 25 MG/1
25 TABLET, EXTENDED RELEASE ORAL DAILY
Qty: 90 TABLET | Refills: 3 | Status: SHIPPED | OUTPATIENT
Start: 2019-08-13 | End: 2020-04-15 | Stop reason: SDUPTHER

## 2019-08-14 ENCOUNTER — OFFICE VISIT (OUTPATIENT)
Dept: FAMILY MEDICINE CLINIC | Facility: CLINIC | Age: 44
End: 2019-08-14

## 2019-08-14 VITALS
SYSTOLIC BLOOD PRESSURE: 140 MMHG | HEIGHT: 61.5 IN | HEART RATE: 88 BPM | WEIGHT: 228 LBS | BODY MASS INDEX: 42.5 KG/M2 | DIASTOLIC BLOOD PRESSURE: 90 MMHG

## 2019-08-14 DIAGNOSIS — M79.671 RIGHT FOOT PAIN: Primary | ICD-10-CM

## 2019-08-14 DIAGNOSIS — M21.41 FLAT FEET, BILATERAL: ICD-10-CM

## 2019-08-14 DIAGNOSIS — M21.42 FLAT FEET, BILATERAL: ICD-10-CM

## 2019-08-14 PROCEDURE — 99213 OFFICE O/P EST LOW 20 MIN: CPT | Performed by: FAMILY MEDICINE

## 2019-08-14 RX ORDER — METHYLPREDNISOLONE 4 MG/1
TABLET ORAL
Qty: 1 KIT | Refills: 0 | Status: SHIPPED | OUTPATIENT
Start: 2019-08-14 | End: 2019-10-01 | Stop reason: ALTCHOICE

## 2019-08-14 NOTE — PROGRESS NOTES
Chepe Bernal is a 45year old female. HPI:   Pt. Complains of right foot pain for the past 3 weeks and does not recall any injury. Patient states that she does have flat feet. She has been using arch support in her shoes.   She has been wearing gabby condition    • Screening for thyroid disorder    • Unspecified sinusitis (chronic)       Social History:  Social History    Tobacco Use      Smoking status: Never Smoker      Smokeless tobacco: Never Used    Alcohol use: No      Alcohol/week: 0.0 standard 31.6 31.0 - 37.0 g/dL    RDW 15.9 (H) 11.0 - 15.0 %    RDW-SD 47.7 (H) 35.1 - 46.3 fL    Neutrophil Absolute Prelim 5.45 1.50 - 7.70 x10 (3) uL    Neutrophil Absolute 5.45 1.50 - 7.70 x10(3) uL    Lymphocyte Absolute 2.53 1.00 - 4.00 x10(3) uL    Monocyte the pain does not improve in the next 10 to 14 days. Advised to start steroid pack in 10 to 14 days if not improved. Advised ankle brace. Advised icing. Advised RICE. The patient indicates understanding of these issues and agrees to the plan.   Retur

## 2019-09-05 ENCOUNTER — HOSPITAL ENCOUNTER (OUTPATIENT)
Dept: GENERAL RADIOLOGY | Age: 44
Discharge: HOME | End: 2019-09-05
Attending: FAMILY MEDICINE
Payer: COMMERCIAL

## 2019-09-05 DIAGNOSIS — M77.31 CALCANEAL SPUR OF FOOT, RIGHT: Primary | ICD-10-CM

## 2019-09-05 DIAGNOSIS — M79.671 RIGHT FOOT PAIN: ICD-10-CM

## 2019-09-05 PROCEDURE — 73630 X-RAY EXAM OF FOOT: CPT | Performed by: FAMILY MEDICINE

## 2019-09-11 ENCOUNTER — APPOINTMENT (OUTPATIENT)
Dept: CARDIOLOGY | Age: 44
End: 2019-09-11

## 2019-10-01 ENCOUNTER — OFFICE VISIT (OUTPATIENT)
Dept: PODIATRY CLINIC | Facility: CLINIC | Age: 44
End: 2019-10-01

## 2019-10-01 DIAGNOSIS — M21.869 GASTROCNEMIUS EQUINUS, UNSPECIFIED LATERALITY: ICD-10-CM

## 2019-10-01 DIAGNOSIS — M77.31 CALCANEAL SPUR OF RIGHT FOOT: ICD-10-CM

## 2019-10-01 DIAGNOSIS — M76.829 POSTERIOR TIBIAL TENDON DYSFUNCTION: Primary | ICD-10-CM

## 2019-10-01 PROCEDURE — 99203 OFFICE O/P NEW LOW 30 MIN: CPT | Performed by: PODIATRIST

## 2019-10-01 RX ORDER — DICLOFENAC SODIUM 75 MG/1
TABLET, DELAYED RELEASE ORAL
Qty: 180 TABLET | Refills: 0 | Status: SHIPPED | OUTPATIENT
Start: 2019-10-01 | End: 2019-11-01

## 2019-10-01 NOTE — TELEPHONE ENCOUNTER
Not protocol medication. LOV :8/14/19 R foot pain, 6/19/19 R knee pain  Last labs done :4/29/19  Next appointment :10/23/19  Please see pending medication. Refill if appropriate.    Last refill:    Date:12/05/18  Amount :60 tablets no refills  Medication: diclofenac sodium 75 mg

## 2019-10-01 NOTE — PROGRESS NOTES
Veronica Garces is a 45year old female. Patient presents with:  New Patient: Right heel spur since August, 10/10 for pain.  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 ASSESSMENT AND PLAN:   Diagnoses and all orders for this visit:    Posterior tibial tendon dysfunction  -     MRI ANKLE, RIGHT (CPT=73721);  Future    Calcaneal spur of

## 2019-10-14 ENCOUNTER — HOSPITAL ENCOUNTER (OUTPATIENT)
Dept: MRI IMAGING | Age: 44
Discharge: HOME | End: 2019-10-14
Attending: PODIATRIST
Payer: COMMERCIAL

## 2019-10-14 DIAGNOSIS — M76.829 POSTERIOR TIBIAL TENDON DYSFUNCTION: ICD-10-CM

## 2019-10-14 PROCEDURE — 73721 MRI JNT OF LWR EXTRE W/O DYE: CPT | Performed by: PODIATRIST

## 2019-10-16 ENCOUNTER — TELEPHONE (OUTPATIENT)
Dept: PODIATRY CLINIC | Facility: CLINIC | Age: 44
End: 2019-10-16

## 2019-10-16 NOTE — TELEPHONE ENCOUNTER
Patient contacted with regards to her MRI information left on her cell phone she was advised to contact the office for more detail

## 2019-10-17 ENCOUNTER — OFFICE VISIT (OUTPATIENT)
Dept: PODIATRY CLINIC | Facility: CLINIC | Age: 44
End: 2019-10-17

## 2019-10-17 DIAGNOSIS — M76.821 POSTERIOR TIBIAL TENDON DYSFUNCTION (PTTD) OF RIGHT LOWER EXTREMITY: Primary | ICD-10-CM

## 2019-10-17 PROCEDURE — 99213 OFFICE O/P EST LOW 20 MIN: CPT | Performed by: PODIATRIST

## 2019-10-17 NOTE — PROGRESS NOTES
Elenora Denver is a 45year old female. Patient presents with: Follow - Up: MRI was done on10/14/19, right ankle Patient states that it was feeling better but today is a rough day.  8/10        HPI:     Presents today with continued pain in that right medi gynecological examination    • Screening for lipoid disorders    • Screening for other and unspecified endocrine, nutritional, metabolic, and immunity disorders    • Screening for other and unspecified endocrine, nutritional, metabolic, and immunity disord refill normal.   3. Musculoskeletal: All muscle groups are graded 5 out of 5 in the foot and ankle. For the posterior tibial tendon on her right side.    4. Neurological: Normal sharp dull sensation; reflexes normal.  She has pain right along the course of

## 2019-10-22 RX ORDER — AMOXICILLIN 500 MG
1200 CAPSULE ORAL
COMMUNITY
Start: 2018-09-19

## 2019-10-22 RX ORDER — DICLOFENAC SODIUM 75 MG/1
TABLET, DELAYED RELEASE ORAL
COMMUNITY
Start: 2019-10-01

## 2019-10-22 RX ORDER — FEXOFENADINE HCL 180 MG/1
180 TABLET ORAL
COMMUNITY

## 2019-10-22 RX ORDER — FLUTICASONE PROPIONATE 50 MCG
2 SPRAY, SUSPENSION (ML) NASAL
COMMUNITY
Start: 2019-07-29

## 2019-10-23 ENCOUNTER — OFFICE VISIT (OUTPATIENT)
Dept: FAMILY MEDICINE CLINIC | Facility: CLINIC | Age: 44
End: 2019-10-23

## 2019-10-23 ENCOUNTER — OFFICE VISIT (OUTPATIENT)
Dept: CARDIOLOGY | Age: 44
End: 2019-10-23

## 2019-10-23 ENCOUNTER — LAB ENCOUNTER (OUTPATIENT)
Dept: LAB | Age: 44
End: 2019-10-23
Attending: FAMILY MEDICINE
Payer: COMMERCIAL

## 2019-10-23 VITALS
BODY MASS INDEX: 41.46 KG/M2 | WEIGHT: 234 LBS | HEIGHT: 63 IN | DIASTOLIC BLOOD PRESSURE: 78 MMHG | HEART RATE: 88 BPM | RESPIRATION RATE: 14 BRPM | SYSTOLIC BLOOD PRESSURE: 134 MMHG

## 2019-10-23 VITALS
WEIGHT: 232 LBS | BODY MASS INDEX: 42.69 KG/M2 | HEIGHT: 62 IN | SYSTOLIC BLOOD PRESSURE: 128 MMHG | DIASTOLIC BLOOD PRESSURE: 80 MMHG | HEART RATE: 60 BPM

## 2019-10-23 DIAGNOSIS — Z00.00 LABORATORY EXAMINATION ORDERED AS PART OF A ROUTINE GENERAL MEDICAL EXAMINATION: ICD-10-CM

## 2019-10-23 DIAGNOSIS — R73.03 PREDIABETES: ICD-10-CM

## 2019-10-23 DIAGNOSIS — E55.9 VITAMIN D DEFICIENCY: ICD-10-CM

## 2019-10-23 DIAGNOSIS — G43.901 MIGRAINE WITH STATUS MIGRAINOSUS, NOT INTRACTABLE, UNSPECIFIED MIGRAINE TYPE: ICD-10-CM

## 2019-10-23 DIAGNOSIS — Z12.4 SCREENING FOR CERVICAL CANCER: ICD-10-CM

## 2019-10-23 DIAGNOSIS — I47.19 ATRIAL TACHYCARDIA: ICD-10-CM

## 2019-10-23 DIAGNOSIS — M79.2 NEURALGIA: ICD-10-CM

## 2019-10-23 DIAGNOSIS — R00.2 PALPITATIONS: Primary | ICD-10-CM

## 2019-10-23 DIAGNOSIS — Z13.89 SCREENING FOR GENITOURINARY CONDITION: ICD-10-CM

## 2019-10-23 DIAGNOSIS — Z12.31 ENCOUNTER FOR SCREENING MAMMOGRAM FOR MALIGNANT NEOPLASM OF BREAST: ICD-10-CM

## 2019-10-23 DIAGNOSIS — Z00.00 ROUTINE GENERAL MEDICAL EXAMINATION AT A HEALTH CARE FACILITY: Primary | ICD-10-CM

## 2019-10-23 DIAGNOSIS — Z23 NEED FOR VACCINATION: ICD-10-CM

## 2019-10-23 DIAGNOSIS — R68.89 FORGETFULNESS: ICD-10-CM

## 2019-10-23 PROCEDURE — 36415 COLL VENOUS BLD VENIPUNCTURE: CPT

## 2019-10-23 PROCEDURE — 99213 OFFICE O/P EST LOW 20 MIN: CPT | Performed by: INTERNAL MEDICINE

## 2019-10-23 PROCEDURE — 80061 LIPID PANEL: CPT

## 2019-10-23 PROCEDURE — 88175 CYTOPATH C/V AUTO FLUID REDO: CPT | Performed by: FAMILY MEDICINE

## 2019-10-23 PROCEDURE — 87624 HPV HI-RISK TYP POOLED RSLT: CPT | Performed by: FAMILY MEDICINE

## 2019-10-23 PROCEDURE — 83036 HEMOGLOBIN GLYCOSYLATED A1C: CPT

## 2019-10-23 PROCEDURE — 85025 COMPLETE CBC W/AUTO DIFF WBC: CPT

## 2019-10-23 PROCEDURE — 80053 COMPREHEN METABOLIC PANEL: CPT

## 2019-10-23 PROCEDURE — 90686 IIV4 VACC NO PRSV 0.5 ML IM: CPT | Performed by: FAMILY MEDICINE

## 2019-10-23 PROCEDURE — 90471 IMMUNIZATION ADMIN: CPT | Performed by: FAMILY MEDICINE

## 2019-10-23 PROCEDURE — 84443 ASSAY THYROID STIM HORMONE: CPT

## 2019-10-23 PROCEDURE — 99396 PREV VISIT EST AGE 40-64: CPT | Performed by: FAMILY MEDICINE

## 2019-10-23 PROCEDURE — 82306 VITAMIN D 25 HYDROXY: CPT

## 2019-10-23 RX ORDER — OMEPRAZOLE 40 MG/1
40 CAPSULE, DELAYED RELEASE ORAL DAILY
Qty: 90 CAPSULE | Refills: 0 | Status: SHIPPED | OUTPATIENT
Start: 2019-10-23 | End: 2020-03-17

## 2019-10-23 ASSESSMENT — ENCOUNTER SYMPTOMS
HEMATOCHEZIA: 0
WEIGHT LOSS: 0
HEMOPTYSIS: 0
ALLERGIC/IMMUNOLOGIC COMMENTS: NO NEW FOOD ALLERGIES
BRUISES/BLEEDS EASILY: 0
SUSPICIOUS LESIONS: 0
COUGH: 0
CHILLS: 0
WEIGHT GAIN: 0
FEVER: 0

## 2019-10-23 NOTE — H&P
CC: Annual Physical Exam    HPI:   Princess Flores is a 45year old female who presents for a complete physical exam. Symptoms: periods are regular. Patient complains of brain zaps for seconds for the past 2-3 months and notes being more forgetful.   Kitty Range    Hold Lavender Auto Resulted    RAINBOW DRAW LIGHT GREEN   Result Value Ref Range    Hold Lt Green Auto Resulted    RAINBOW DRAW GOLD   Result Value Ref Range    Hold Gold Auto Resulted    CBC W/ DIFFERENTIAL   Result Value Ref Range    WBC 8.8 4.0 • Routine gynecological examination    • Screening for lipoid disorders    • Screening for other and unspecified endocrine, nutritional, metabolic, and immunity disorders    • Screening for other and unspecified endocrine, nutritional, metabolic, and imm distress  SKIN: no rashes,no suspicious lesions  HEENT: atraumatic, normocephalic,ears and throat are clear  EYES:PERRLA, EOMI, conjunctiva are clear  NECK: supple,no adenopathy,no bruits; no thyromegaly  CHEST: no chest tenderness  BREAST: no dominant or PANEL      TSH W REFLEX TO FREE T4      LIPID PANEL      HGB A1C      Urinalysis, Routine [E]      ThinPrep PAP with HPV Reflex Request      Meds & Refills for this Visit:  Requested Prescriptions     Signed Prescriptions Disp Refills   • Omeprazole 40 MG

## 2019-10-24 DIAGNOSIS — E55.9 VITAMIN D DEFICIENCY: Primary | ICD-10-CM

## 2019-10-24 RX ORDER — ERGOCALCIFEROL 1.25 MG/1
50000 CAPSULE ORAL WEEKLY
Qty: 12 CAPSULE | Refills: 0 | Status: SHIPPED | OUTPATIENT
Start: 2019-10-24 | End: 2020-06-09

## 2019-10-24 RX ORDER — ERGOCALCIFEROL 1.25 MG/1
CAPSULE ORAL
Qty: 13 CAPSULE | Refills: 0 | OUTPATIENT
Start: 2019-10-24

## 2019-10-29 ENCOUNTER — HOSPITAL ENCOUNTER (EMERGENCY)
Facility: HOSPITAL | Age: 44
Discharge: HOME | End: 2019-10-29
Attending: EMERGENCY MEDICINE
Payer: COMMERCIAL

## 2019-10-29 ENCOUNTER — TELEPHONE (OUTPATIENT)
Dept: FAMILY MEDICINE CLINIC | Facility: CLINIC | Age: 44
End: 2019-10-29

## 2019-10-29 VITALS
TEMPERATURE: 97 F | HEART RATE: 73 BPM | SYSTOLIC BLOOD PRESSURE: 132 MMHG | RESPIRATION RATE: 18 BRPM | HEIGHT: 62 IN | OXYGEN SATURATION: 98 % | WEIGHT: 230 LBS | DIASTOLIC BLOOD PRESSURE: 87 MMHG | BODY MASS INDEX: 42.33 KG/M2

## 2019-10-29 DIAGNOSIS — G43.809 OTHER MIGRAINE WITHOUT STATUS MIGRAINOSUS, NOT INTRACTABLE: Primary | ICD-10-CM

## 2019-10-29 PROCEDURE — 80053 COMPREHEN METABOLIC PANEL: CPT | Performed by: EMERGENCY MEDICINE

## 2019-10-29 PROCEDURE — 96375 TX/PRO/DX INJ NEW DRUG ADDON: CPT

## 2019-10-29 PROCEDURE — 99285 EMERGENCY DEPT VISIT HI MDM: CPT

## 2019-10-29 PROCEDURE — 85025 COMPLETE CBC W/AUTO DIFF WBC: CPT | Performed by: EMERGENCY MEDICINE

## 2019-10-29 PROCEDURE — 96376 TX/PRO/DX INJ SAME DRUG ADON: CPT

## 2019-10-29 PROCEDURE — 99284 EMERGENCY DEPT VISIT MOD MDM: CPT

## 2019-10-29 PROCEDURE — 96374 THER/PROPH/DIAG INJ IV PUSH: CPT

## 2019-10-29 RX ORDER — HYDRALAZINE HYDROCHLORIDE 20 MG/ML
10 INJECTION INTRAMUSCULAR; INTRAVENOUS ONCE
Status: COMPLETED | OUTPATIENT
Start: 2019-10-29 | End: 2019-10-29

## 2019-10-29 RX ORDER — METOCLOPRAMIDE HYDROCHLORIDE 5 MG/ML
5 INJECTION INTRAMUSCULAR; INTRAVENOUS ONCE
Status: COMPLETED | OUTPATIENT
Start: 2019-10-29 | End: 2019-10-29

## 2019-10-29 RX ORDER — METOCLOPRAMIDE HYDROCHLORIDE 5 MG/ML
10 INJECTION INTRAMUSCULAR; INTRAVENOUS ONCE
Status: COMPLETED | OUTPATIENT
Start: 2019-10-29 | End: 2019-10-29

## 2019-10-29 RX ORDER — DIPHENHYDRAMINE HYDROCHLORIDE 50 MG/ML
25 INJECTION INTRAMUSCULAR; INTRAVENOUS ONCE
Status: COMPLETED | OUTPATIENT
Start: 2019-10-29 | End: 2019-10-29

## 2019-10-29 RX ORDER — DIPHENHYDRAMINE HYDROCHLORIDE 50 MG/ML
50 INJECTION INTRAMUSCULAR; INTRAVENOUS ONCE
Status: COMPLETED | OUTPATIENT
Start: 2019-10-29 | End: 2019-10-29

## 2019-10-29 RX ORDER — KETOROLAC TROMETHAMINE 30 MG/ML
30 INJECTION, SOLUTION INTRAMUSCULAR; INTRAVENOUS ONCE
Status: COMPLETED | OUTPATIENT
Start: 2019-10-29 | End: 2019-10-29

## 2019-10-29 RX ORDER — DEXAMETHASONE SODIUM PHOSPHATE 4 MG/ML
10 VIAL (ML) INJECTION ONCE
Status: COMPLETED | OUTPATIENT
Start: 2019-10-29 | End: 2019-10-29

## 2019-10-29 RX ORDER — ONDANSETRON 2 MG/ML
4 INJECTION INTRAMUSCULAR; INTRAVENOUS ONCE
Status: DISCONTINUED | OUTPATIENT
Start: 2019-10-29 | End: 2019-10-29

## 2019-10-29 RX ORDER — DIPHENHYDRAMINE HYDROCHLORIDE 50 MG/ML
25 INJECTION INTRAMUSCULAR; INTRAVENOUS ONCE
Status: DISCONTINUED | OUTPATIENT
Start: 2019-10-29 | End: 2019-10-29

## 2019-10-29 NOTE — ED INITIAL ASSESSMENT (HPI)
PT STATES SHE WAS AWOKEN BY A HEADACHE AT 0545 THIS AM WITH N/V  AND LIGHT  SENSITIVITY. H/O MIGRAINE HEADACHES.

## 2019-10-29 NOTE — ED PROVIDER NOTES
Patient Seen in: [facility] Emergency Department      History   Patient presents with:  Headache (neurologic)    Stated Complaint: Migraine     HPI    61-year-old female presents with headache. She reports a chronic recurrent history of headaches. Pulse 10/29/19 0800 63   Resp 10/29/19 0802 18   Temp 10/29/19 0802 96.7 °F (35.9 °C)   Temp src 10/29/19 0802 Temporal   SpO2 10/29/19 0800 96 %   O2 Device 10/29/19 0802 None (Room air)       Current:BP (!) 152/94   Pulse 74   Temp 96.7 °F (35.9 °C) (T ordered for treatment of migraine headache. No neurologic deficits. Chronic recurrent migraine and she states this is her typical.    Headache is reduced from a 10 to a 6 after medications.   We discussed that at this time there is little more I can do to

## 2019-10-29 NOTE — TELEPHONE ENCOUNTER
Referral to [facility] Neurologist (INTERNAL)    Reason for the order/referral: REFERRAL REQUEST   PCP: Radha Murphy MD   Refer to Provider:  Deni Escobar MD   Specialty:  NEUROLOGY   Patient Insurance: Payor: [facility] / Plan: Angelique Olivas / Samuel Nix

## 2019-10-31 RX ORDER — DICLOFENAC SODIUM 75 MG/1
TABLET, DELAYED RELEASE ORAL
Qty: 60 TABLET | Refills: 0 | Status: SHIPPED | OUTPATIENT
Start: 2019-10-31 | End: 2019-11-29

## 2019-11-01 ENCOUNTER — OFFICE VISIT (OUTPATIENT)
Dept: FAMILY MEDICINE CLINIC | Facility: CLINIC | Age: 44
End: 2019-11-01

## 2019-11-01 VITALS
WEIGHT: 230 LBS | HEIGHT: 62 IN | SYSTOLIC BLOOD PRESSURE: 140 MMHG | TEMPERATURE: 98 F | DIASTOLIC BLOOD PRESSURE: 82 MMHG | RESPIRATION RATE: 16 BRPM | BODY MASS INDEX: 42.33 KG/M2 | HEART RATE: 80 BPM

## 2019-11-01 DIAGNOSIS — N89.8 VAGINAL ODOR: ICD-10-CM

## 2019-11-01 DIAGNOSIS — Z09 HOSPITAL DISCHARGE FOLLOW-UP: Primary | ICD-10-CM

## 2019-11-01 DIAGNOSIS — G43.901 MIGRAINE WITH STATUS MIGRAINOSUS, NOT INTRACTABLE, UNSPECIFIED MIGRAINE TYPE: ICD-10-CM

## 2019-11-01 DIAGNOSIS — E66.01 MORBID OBESITY WITH BMI OF 40.0-44.9, ADULT (HCC): ICD-10-CM

## 2019-11-01 DIAGNOSIS — R31.9 HEMATURIA, UNSPECIFIED TYPE: ICD-10-CM

## 2019-11-01 PROBLEM — I47.19 ATRIAL TACHYCARDIA: Status: ACTIVE | Noted: 2019-10-23

## 2019-11-01 PROBLEM — I47.1 ATRIAL TACHYCARDIA (HCC): Status: ACTIVE | Noted: 2019-10-23

## 2019-11-01 PROBLEM — I47.19 ATRIAL TACHYCARDIA (HCC): Status: ACTIVE | Noted: 2019-10-23

## 2019-11-01 PROCEDURE — 87480 CANDIDA DNA DIR PROBE: CPT | Performed by: FAMILY MEDICINE

## 2019-11-01 PROCEDURE — 99214 OFFICE O/P EST MOD 30 MIN: CPT | Performed by: FAMILY MEDICINE

## 2019-11-01 PROCEDURE — 87660 TRICHOMONAS VAGIN DIR PROBE: CPT | Performed by: FAMILY MEDICINE

## 2019-11-01 PROCEDURE — 87510 GARDNER VAG DNA DIR PROBE: CPT | Performed by: FAMILY MEDICINE

## 2019-11-01 RX ORDER — BUTALBITAL, ACETAMINOPHEN AND CAFFEINE 300; 40; 50 MG/1; MG/1; MG/1
1 CAPSULE ORAL EVERY 6 HOURS PRN
Qty: 30 CAPSULE | Refills: 0 | Status: SHIPPED | OUTPATIENT
Start: 2019-11-01 | End: 2020-05-01

## 2019-11-01 NOTE — PROGRESS NOTES
Tl Uribe is a 45year old female. HPI:   Pt. Is here for ER follow up. Patient states she is feeling much better than she did on Tuesday. She states that the migraine was the worst headache she has had in her life.   When she was in the emergency r Diagnosis Date   • Acute pharyngitis    • Ganglion of tendon sheath    • Migraines    • Palpitations    • Personal history of urinary calculi    • Routine gynecological examination    • Screening for lipoid disorders    • Screening for other and unspecif Ref Range    WBC 9.5 4.0 - 11.0 x10(3) uL    RBC 4.16 3.80 - 5.30 x10(6)uL    HGB 10.6 (L) 12.0 - 16.0 g/dL    HCT 33.3 (L) 35.0 - 48.0 %    .0 150.0 - 450.0 10(3)uL    MCV 80.0 80.0 - 100.0 fL    MCH 25.5 (L) 26.0 - 34.0 pg    MCHC 31.8 31.0 - 37. 0 murmur  GI: good BS's,no masses, HSM or tenderness  EXTREMITIES: no cyanosis, clubbing or edema    ASSESSMENT AND PLAN:   Hospital discharge follow-up  (primary encounter diagnosis)  Migraine with status migrainosus, not intractable, unspecified migraine t

## 2019-11-06 ENCOUNTER — HOSPITAL ENCOUNTER (OUTPATIENT)
Dept: MRI IMAGING | Facility: HOSPITAL | Age: 44
Discharge: HOME | End: 2019-11-06
Attending: FAMILY MEDICINE
Payer: COMMERCIAL

## 2019-11-06 DIAGNOSIS — R68.89 FORGETFULNESS: ICD-10-CM

## 2019-11-06 DIAGNOSIS — J01.01 ACUTE RECURRENT MAXILLARY SINUSITIS: Primary | ICD-10-CM

## 2019-11-06 DIAGNOSIS — G43.901 MIGRAINE WITH STATUS MIGRAINOSUS, NOT INTRACTABLE, UNSPECIFIED MIGRAINE TYPE: ICD-10-CM

## 2019-11-06 DIAGNOSIS — M79.2 NEURALGIA: ICD-10-CM

## 2019-11-06 PROCEDURE — 70551 MRI BRAIN STEM W/O DYE: CPT | Performed by: FAMILY MEDICINE

## 2019-11-06 RX ORDER — AMOXICILLIN AND CLAVULANATE POTASSIUM 875; 125 MG/1; MG/1
1 TABLET, FILM COATED ORAL 2 TIMES DAILY
Qty: 20 TABLET | Refills: 0 | Status: SHIPPED | OUTPATIENT
Start: 2019-11-06 | End: 2019-11-16

## 2019-11-21 NOTE — PROGRESS NOTES
Zuleima Quiroz is a 45year old female. HPI:   PT. Is here for follow up on her BP. BP at home 110-120's/80-90's  Pt. Drove to [place], [place] -- 2.5 hours 2 weeks ago and since then not on and off SOB. Mother and daughter with hx of PE.   Would like a Alcohol/week: 0.0 standard drinks    Drug use: No       Results for orders placed or performed in visit on 11/01/19   URINALYSIS WITH CULTURE REFLEX   Result Value Ref Range    Urine Color Yellow Yellow    Clarity Urine Clear Clear    Spec Gravity 1.015 1. embolism    Orders Placed This Encounter      d-dimer      Meds & Refills for this Visit:  Requested Prescriptions     Signed Prescriptions Disp Refills   • topiramate 50 MG Oral Tab 90 tablet 1     Sig: Take 1 tablet (50 mg total) by mouth nightly.

## 2019-11-22 ENCOUNTER — HOSPITAL ENCOUNTER (OUTPATIENT)
Dept: CT IMAGING | Facility: HOSPITAL | Age: 44
Discharge: HOME | End: 2019-11-22
Attending: FAMILY MEDICINE
Payer: COMMERCIAL

## 2019-11-22 ENCOUNTER — APPOINTMENT (OUTPATIENT)
Dept: LAB | Facility: HOSPITAL | Age: 44
End: 2019-11-22
Attending: FAMILY MEDICINE
Payer: COMMERCIAL

## 2019-11-22 ENCOUNTER — OFFICE VISIT (OUTPATIENT)
Dept: FAMILY MEDICINE CLINIC | Facility: CLINIC | Age: 44
End: 2019-11-22

## 2019-11-22 VITALS
BODY MASS INDEX: 42.88 KG/M2 | RESPIRATION RATE: 18 BRPM | SYSTOLIC BLOOD PRESSURE: 118 MMHG | TEMPERATURE: 98 F | HEART RATE: 98 BPM | WEIGHT: 233 LBS | HEIGHT: 62 IN | DIASTOLIC BLOOD PRESSURE: 76 MMHG

## 2019-11-22 DIAGNOSIS — R79.89 POSITIVE D DIMER: Primary | ICD-10-CM

## 2019-11-22 DIAGNOSIS — R79.89 POSITIVE D DIMER: ICD-10-CM

## 2019-11-22 DIAGNOSIS — R06.02 SHORTNESS OF BREATH: ICD-10-CM

## 2019-11-22 DIAGNOSIS — R03.0 ELEVATED BP WITHOUT DIAGNOSIS OF HYPERTENSION: Primary | ICD-10-CM

## 2019-11-22 DIAGNOSIS — Z82.49 FAMILY HISTORY OF PULMONARY EMBOLISM: ICD-10-CM

## 2019-11-22 DIAGNOSIS — G43.901 MIGRAINE WITH STATUS MIGRAINOSUS, NOT INTRACTABLE, UNSPECIFIED MIGRAINE TYPE: ICD-10-CM

## 2019-11-22 PROCEDURE — 36415 COLL VENOUS BLD VENIPUNCTURE: CPT

## 2019-11-22 PROCEDURE — 71275 CT ANGIOGRAPHY CHEST: CPT | Performed by: FAMILY MEDICINE

## 2019-11-22 PROCEDURE — 85379 FIBRIN DEGRADATION QUANT: CPT

## 2019-11-22 PROCEDURE — 99214 OFFICE O/P EST MOD 30 MIN: CPT | Performed by: FAMILY MEDICINE

## 2019-11-22 RX ORDER — TOPIRAMATE 50 MG/1
50 TABLET, FILM COATED ORAL NIGHTLY
Qty: 90 TABLET | Refills: 1 | Status: SHIPPED | OUTPATIENT
Start: 2019-11-22 | End: 2020-06-09 | Stop reason: ALTCHOICE

## 2019-11-29 ENCOUNTER — HOSPITAL ENCOUNTER (OUTPATIENT)
Dept: MAMMOGRAPHY | Age: 44
Discharge: HOME | End: 2019-11-29
Attending: FAMILY MEDICINE
Payer: COMMERCIAL

## 2019-11-29 DIAGNOSIS — Z12.31 ENCOUNTER FOR SCREENING MAMMOGRAM FOR MALIGNANT NEOPLASM OF BREAST: ICD-10-CM

## 2019-11-29 PROCEDURE — 77067 SCR MAMMO BI INCL CAD: CPT | Performed by: FAMILY MEDICINE

## 2019-11-29 PROCEDURE — 77063 BREAST TOMOSYNTHESIS BI: CPT | Performed by: FAMILY MEDICINE

## 2019-12-01 RX ORDER — DICLOFENAC SODIUM 75 MG/1
TABLET, DELAYED RELEASE ORAL
Qty: 60 TABLET | Refills: 0 | Status: SHIPPED | OUTPATIENT
Start: 2019-12-01 | End: 2019-12-31

## 2019-12-11 ENCOUNTER — TELEPHONE (OUTPATIENT)
Dept: FAMILY MEDICINE CLINIC | Facility: CLINIC | Age: 44
End: 2019-12-11

## 2019-12-11 NOTE — TELEPHONE ENCOUNTER
1. What are your symptoms? Abdominal bloating with back pain      2. How long have you been having these symptoms? Started last night in the middle of the night      3. Have you done anything already to treat your symptoms?    No      ADDITIONAL INFO:

## 2019-12-11 NOTE — TELEPHONE ENCOUNTER
Patient complains of abdominal bloating x 1.5 weeks. Since last night very uncomfortable, denies fevers, N/V/D. Since last night fullness/uncomfortable feeling L.side around to flank. Patient instructed to go to urgent care for evaluation. Patient agrees.

## 2019-12-18 ENCOUNTER — TELEPHONE (OUTPATIENT)
Dept: FAMILY MEDICINE CLINIC | Facility: CLINIC | Age: 44
End: 2019-12-18

## 2019-12-18 DIAGNOSIS — G43.901 MIGRAINE WITH STATUS MIGRAINOSUS, NOT INTRACTABLE, UNSPECIFIED MIGRAINE TYPE: Primary | ICD-10-CM

## 2019-12-18 NOTE — TELEPHONE ENCOUNTER
Referral to Neurologist (Sushil Muro [facility])    From: Ansley Heller   Sent: 12/16/2019   4:00 PM CST   To: [facility] Clinical Staff   Subject: REFERRAL                                         .Reason for the order/referral:  MIGRANE   PCP: Derek Gage

## 2019-12-21 ENCOUNTER — MED REC SCAN ONLY (OUTPATIENT)
Dept: FAMILY MEDICINE CLINIC | Facility: CLINIC | Age: 44
End: 2019-12-21

## 2019-12-31 RX ORDER — DICLOFENAC SODIUM 75 MG/1
TABLET, DELAYED RELEASE ORAL
Qty: 60 TABLET | Refills: 2 | Status: SHIPPED | OUTPATIENT
Start: 2019-12-31 | End: 2020-06-09

## 2020-01-06 ENCOUNTER — APPOINTMENT (OUTPATIENT)
Dept: PHYSICAL THERAPY | Facility: HOSPITAL | Age: 45
End: 2020-01-06
Attending: PODIATRIST
Payer: COMMERCIAL

## 2020-01-08 ENCOUNTER — APPOINTMENT (OUTPATIENT)
Dept: PHYSICAL THERAPY | Facility: HOSPITAL | Age: 45
End: 2020-01-08
Attending: PODIATRIST
Payer: COMMERCIAL

## 2020-01-08 ENCOUNTER — TELEPHONE (OUTPATIENT)
Dept: PHYSICAL THERAPY | Facility: HOSPITAL | Age: 45
End: 2020-01-08

## 2020-01-13 ENCOUNTER — OFFICE VISIT (OUTPATIENT)
Dept: PHYSICAL THERAPY | Facility: HOSPITAL | Age: 45
End: 2020-01-13
Attending: PODIATRIST
Payer: COMMERCIAL

## 2020-01-13 DIAGNOSIS — M76.821 POSTERIOR TIBIAL TENDON DYSFUNCTION (PTTD) OF RIGHT LOWER EXTREMITY: ICD-10-CM

## 2020-01-13 PROCEDURE — 97161 PT EVAL LOW COMPLEX 20 MIN: CPT

## 2020-01-13 NOTE — PROGRESS NOTES
LOWER EXTREMITY EVALUATION:   Referring Physician: Dr. 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 Pt goals include decrease pain and return to walking normally. Past medical history was reviewed with Jacqueline Chirinos.  Significant findings include  has a past medical history of distraction grade 2 (hypomobile) (B), posterior glide grade 2 (hypomobile) (B)     Flexibility:  Hip Flexor: NT  Hamstrings: mild limitations (B)  Piriformis: NT  Quads: NT  Gastroc-soleus: mod-major limitations (B)    Strength/MMT: (* denotes performed wi >20s for increased ankle stability with ambulation on uneven surfaces such as gravel and grass   · Pt will report <3/10 pain with work and home activities such as walking and carrying children   · Pt will be independent and compliant with comprehensive HEP

## 2020-01-15 ENCOUNTER — TELEPHONE (OUTPATIENT)
Dept: PHYSICAL THERAPY | Facility: HOSPITAL | Age: 45
End: 2020-01-15

## 2020-01-20 ENCOUNTER — TELEPHONE (OUTPATIENT)
Dept: PHYSICAL THERAPY | Facility: HOSPITAL | Age: 45
End: 2020-01-20

## 2020-01-22 ENCOUNTER — OFFICE VISIT (OUTPATIENT)
Dept: PHYSICAL THERAPY | Facility: HOSPITAL | Age: 45
End: 2020-01-22
Attending: PODIATRIST
Payer: COMMERCIAL

## 2020-01-22 DIAGNOSIS — M76.821 POSTERIOR TIBIAL TENDON DYSFUNCTION (PTTD) OF RIGHT LOWER EXTREMITY: ICD-10-CM

## 2020-01-22 PROCEDURE — 97140 MANUAL THERAPY 1/> REGIONS: CPT

## 2020-01-22 PROCEDURE — 97110 THERAPEUTIC EXERCISES: CPT

## 2020-01-22 NOTE — PROGRESS NOTES
Dx: Posterior tibial tendon dysfunction (PTTD) of right lower extremity (B24.114)            Insurance (Authorized # of Visits):  6           Authorizing Physician: Dr. Lia Dumont  Next MD visit: none scheduled  Fall Risk: standard         Precautions: n/a surfaces such as gravel and grass   · Pt will report <3/10 pain with work and home activities such as walking and carrying children   · Pt will be independent and compliant with comprehensive HEP to maintain progress achieved in PT    Plan: progress R ahmetl

## 2020-01-27 ENCOUNTER — OFFICE VISIT (OUTPATIENT)
Dept: PHYSICAL THERAPY | Facility: HOSPITAL | Age: 45
End: 2020-01-27
Attending: PODIATRIST
Payer: COMMERCIAL

## 2020-01-27 DIAGNOSIS — M76.821 POSTERIOR TIBIAL TENDON DYSFUNCTION (PTTD) OF RIGHT LOWER EXTREMITY: ICD-10-CM

## 2020-01-27 PROCEDURE — 97110 THERAPEUTIC EXERCISES: CPT

## 2020-01-27 PROCEDURE — 97140 MANUAL THERAPY 1/> REGIONS: CPT

## 2020-01-27 NOTE — PROGRESS NOTES
Dx: Posterior tibial tendon dysfunction (PTTD) of right lower extremity (A47.517)            Insurance (Authorized # of Visits):  6           Authorizing Physician: Dr. Jose Dukes  Next MD visit: none scheduled  Fall Risk: standard         Precautions: n/a comprehensive HEP to maintain progress achieved in PT    Plan: assess response to taping; progress R ankle strength, ROM, talocrural joint mobility, static balance  Date: 1/22/2020  TX#: 2/8 Date: 1/27/2020  TX#: 3/8 Date:                 TX#: 4/ Date:

## 2020-01-29 ENCOUNTER — OFFICE VISIT (OUTPATIENT)
Dept: PHYSICAL THERAPY | Facility: HOSPITAL | Age: 45
End: 2020-01-29
Attending: PODIATRIST
Payer: COMMERCIAL

## 2020-01-29 DIAGNOSIS — M76.821 POSTERIOR TIBIAL TENDON DYSFUNCTION (PTTD) OF RIGHT LOWER EXTREMITY: ICD-10-CM

## 2020-01-29 PROCEDURE — 97140 MANUAL THERAPY 1/> REGIONS: CPT

## 2020-01-29 PROCEDURE — 97110 THERAPEUTIC EXERCISES: CPT

## 2020-01-29 NOTE — PROGRESS NOTES
Dx: Posterior tibial tendon dysfunction (PTTD) of right lower extremity (Z44.230)            Insurance (Authorized # of Visits):  6           Authorizing Physician: Dr. Sagrario Ordoñez  Next MD visit: none scheduled  Fall Risk: standard         Precautions: n/a talocrural distraction grade III x4'  (B) talocrural posterior glide grade IV with DF mobilization x4' L Plantar fascia IASTM x6'  R Posterior tibial tendon and musculotendinous intersection IASTM x7'  (B) talocrural distraction grade III x4'  (B) talocrur

## 2020-02-03 ENCOUNTER — OFFICE VISIT (OUTPATIENT)
Dept: PHYSICAL THERAPY | Facility: HOSPITAL | Age: 45
End: 2020-02-03
Attending: PODIATRIST
Payer: COMMERCIAL

## 2020-02-03 DIAGNOSIS — M76.821 POSTERIOR TIBIAL TENDON DYSFUNCTION (PTTD) OF RIGHT LOWER EXTREMITY: ICD-10-CM

## 2020-02-03 PROCEDURE — 97110 THERAPEUTIC EXERCISES: CPT

## 2020-02-03 PROCEDURE — 97140 MANUAL THERAPY 1/> REGIONS: CPT

## 2020-02-03 NOTE — PROGRESS NOTES
Dx: Posterior tibial tendon dysfunction (PTTD) of right lower extremity (G44.348)            Insurance (Authorized # of Visits):  6           Authorizing Physician: Dr. Nicole Poole  Next MD visit: none scheduled  Fall Risk: standard         Precautions: n/a emphasize talocrural joint mobility, continued with taping and stretching; static balance, teach self taping at home   Date: 1/22/2020  TX#: 2/8 Date: 1/27/2020  TX#: 3/8 Date:   1/29/2020           TX#: 4/8 Date: 2/3/2020  TX#: 5/8 Date:    Tx#: 6/   (B) P stretch  -Seated arch lifts  -Resisted inversion with RTB.     Charges: 1 manual, 2 therex       Total Timed Treatment: 45 min  Total Treatment Time: 45 min    This treatment was provided by FERNANDO Garcia under the direct and constant direction and supe

## 2020-02-05 ENCOUNTER — OFFICE VISIT (OUTPATIENT)
Dept: PHYSICAL THERAPY | Facility: HOSPITAL | Age: 45
End: 2020-02-05
Attending: PODIATRIST
Payer: COMMERCIAL

## 2020-02-05 DIAGNOSIS — M76.821 POSTERIOR TIBIAL TENDON DYSFUNCTION (PTTD) OF RIGHT LOWER EXTREMITY: ICD-10-CM

## 2020-02-05 PROCEDURE — 97110 THERAPEUTIC EXERCISES: CPT

## 2020-02-05 PROCEDURE — 97140 MANUAL THERAPY 1/> REGIONS: CPT

## 2020-02-05 NOTE — PROGRESS NOTES
Dx: Posterior tibial tendon dysfunction (PTTD) of right lower extremity (Y87.089)            Insurance (Authorized # of Visits):  6           Authorizing Physician: Dr. Jose Armando Osman  Next MD visit: none scheduled  Fall Risk: standard         Precautions: n/a children   · Pt will be independent and compliant with comprehensive HEP to maintain progress achieved in PT    Plan: emphasize talocrural joint mobility, continued with taping and stretching; static balance  Date: 1/22/2020  TX#: 2/8 Date: 1/27/2020  TX#: 10   Resisted R foot inversion 10 x 5 sec  Standing calf stretch 3 x 30 sec L/R Heel raises 2 x 10  Resisted R foot inversion 10 x 5 sec  Seated (B) arch lifts x2'  Seated (B) toe splaying 2x10 Seated (B) arch lifts x2'  Seated (B) toe splaying x2'   SLS (

## 2020-02-08 ENCOUNTER — HOSPITAL ENCOUNTER (OUTPATIENT)
Age: 45
Discharge: HOME | End: 2020-02-08
Attending: FAMILY MEDICINE
Payer: COMMERCIAL

## 2020-02-08 VITALS
TEMPERATURE: 98 F | OXYGEN SATURATION: 100 % | HEART RATE: 115 BPM | SYSTOLIC BLOOD PRESSURE: 116 MMHG | RESPIRATION RATE: 16 BRPM | DIASTOLIC BLOOD PRESSURE: 82 MMHG

## 2020-02-08 DIAGNOSIS — H65.92 LEFT OTITIS MEDIA WITH EFFUSION: Primary | ICD-10-CM

## 2020-02-08 DIAGNOSIS — J01.00 ACUTE MAXILLARY SINUSITIS, RECURRENCE NOT SPECIFIED: ICD-10-CM

## 2020-02-08 DIAGNOSIS — R09.82 POST-NASAL DRIP: ICD-10-CM

## 2020-02-08 LAB
POCT INFLUENZA A: NEGATIVE
POCT INFLUENZA B: NEGATIVE

## 2020-02-08 PROCEDURE — 99214 OFFICE O/P EST MOD 30 MIN: CPT

## 2020-02-08 PROCEDURE — 99213 OFFICE O/P EST LOW 20 MIN: CPT

## 2020-02-08 PROCEDURE — 87502 INFLUENZA DNA AMP PROBE: CPT | Performed by: FAMILY MEDICINE

## 2020-02-08 RX ORDER — AMOXICILLIN AND CLAVULANATE POTASSIUM 875; 125 MG/1; MG/1
1 TABLET, FILM COATED ORAL 2 TIMES DAILY
Qty: 20 TABLET | Refills: 0 | Status: SHIPPED | OUTPATIENT
Start: 2020-02-08 | End: 2020-02-18

## 2020-02-08 NOTE — ED PROVIDER NOTES
Patient Seen in: [facility]      History   Patient presents with:   Body ache and/or chills    Stated Complaint: COUGH/BODY ACHES/CHILLS/FEVER/CONGESTION X 3 DAYS    HPI  This is a 42-year-old female coming in with complaints of Pulse 115   Temp 98.4 °F (36.9 °C) (Oral)   Resp 16   LMP 02/01/2020 (Exact Date)   SpO2 100%       Physical Exam  GEN: Not in any acute distress, making good conversation, answering appropriately   SKIN: No pallor, no erythema, no cyanosis, warm and dry emphasized  · Vitamin C 8730-6261 mg per day  · If you were prescribed antibiotics (if this is determined to be bacterial) make sure to take a probiotic - OTC Culturelle / Florastor to avoid any GI distress from the antibiotic  Worsening of symptoms includ

## 2020-02-10 ENCOUNTER — TELEPHONE (OUTPATIENT)
Dept: PHYSICAL THERAPY | Facility: HOSPITAL | Age: 45
End: 2020-02-10

## 2020-02-12 ENCOUNTER — APPOINTMENT (OUTPATIENT)
Dept: PHYSICAL THERAPY | Facility: HOSPITAL | Age: 45
End: 2020-02-12
Attending: PODIATRIST
Payer: COMMERCIAL

## 2020-03-06 ENCOUNTER — APPOINTMENT (OUTPATIENT)
Dept: PHYSICAL THERAPY | Facility: HOSPITAL | Age: 45
End: 2020-03-06
Attending: FAMILY MEDICINE
Payer: COMMERCIAL

## 2020-03-06 NOTE — TELEPHONE ENCOUNTER
Please call pt to schedule blood pressure follow up with Dr. Luda Lopez. LOV: 11/22/19 asked to return in 2 months    Thank you.

## 2020-03-09 ENCOUNTER — TELEPHONE (OUTPATIENT)
Dept: PHYSICAL THERAPY | Facility: HOSPITAL | Age: 45
End: 2020-03-09

## 2020-03-09 ENCOUNTER — APPOINTMENT (OUTPATIENT)
Dept: PHYSICAL THERAPY | Facility: HOSPITAL | Age: 45
End: 2020-03-09
Attending: FAMILY MEDICINE
Payer: COMMERCIAL

## 2020-03-11 ENCOUNTER — APPOINTMENT (OUTPATIENT)
Dept: PHYSICAL THERAPY | Facility: HOSPITAL | Age: 45
End: 2020-03-11
Attending: FAMILY MEDICINE
Payer: COMMERCIAL

## 2020-03-16 ENCOUNTER — APPOINTMENT (OUTPATIENT)
Dept: PHYSICAL THERAPY | Facility: HOSPITAL | Age: 45
End: 2020-03-16
Attending: FAMILY MEDICINE
Payer: COMMERCIAL

## 2020-03-17 RX ORDER — OMEPRAZOLE 40 MG/1
CAPSULE, DELAYED RELEASE ORAL
Qty: 90 CAPSULE | Refills: 0 | Status: SHIPPED | OUTPATIENT
Start: 2020-03-17 | End: 2021-04-16

## 2020-03-18 ENCOUNTER — APPOINTMENT (OUTPATIENT)
Dept: PHYSICAL THERAPY | Facility: HOSPITAL | Age: 45
End: 2020-03-18
Attending: FAMILY MEDICINE
Payer: COMMERCIAL

## 2020-03-23 ENCOUNTER — APPOINTMENT (OUTPATIENT)
Dept: PHYSICAL THERAPY | Facility: HOSPITAL | Age: 45
End: 2020-03-23
Attending: FAMILY MEDICINE
Payer: COMMERCIAL

## 2020-03-25 ENCOUNTER — APPOINTMENT (OUTPATIENT)
Dept: PHYSICAL THERAPY | Facility: HOSPITAL | Age: 45
End: 2020-03-25
Attending: FAMILY MEDICINE
Payer: COMMERCIAL

## 2020-04-13 ENCOUNTER — TELEPHONE (OUTPATIENT)
Dept: CARDIOLOGY | Age: 45
End: 2020-04-13

## 2020-04-15 ENCOUNTER — OFFICE VISIT (OUTPATIENT)
Dept: CARDIOLOGY | Age: 45
End: 2020-04-15

## 2020-04-15 DIAGNOSIS — R00.2 PALPITATIONS: Primary | ICD-10-CM

## 2020-04-15 DIAGNOSIS — I47.19 ATRIAL TACHYCARDIA: ICD-10-CM

## 2020-04-15 PROCEDURE — 99214 OFFICE O/P EST MOD 30 MIN: CPT | Performed by: NURSE PRACTITIONER

## 2020-04-15 RX ORDER — METOPROLOL SUCCINATE 25 MG/1
25 TABLET, EXTENDED RELEASE ORAL 2 TIMES DAILY
Qty: 90 TABLET | Refills: 3 | Status: SHIPPED | COMMUNITY
Start: 2020-04-15 | End: 2020-10-26 | Stop reason: SDUPTHER

## 2020-04-15 ASSESSMENT — ENCOUNTER SYMPTOMS
FEVER: 0
BLOATING: 0
SYNCOPE: 0
ORTHOPNEA: 0
NEAR-SYNCOPE: 0
ABDOMINAL PAIN: 0
COUGH: 0
SHORTNESS OF BREATH: 0
NIGHT SWEATS: 0

## 2020-04-17 ENCOUNTER — MED REC SCAN ONLY (OUTPATIENT)
Dept: FAMILY MEDICINE CLINIC | Facility: CLINIC | Age: 45
End: 2020-04-17

## 2020-04-30 ENCOUNTER — TELEPHONE (OUTPATIENT)
Dept: FAMILY MEDICINE CLINIC | Facility: CLINIC | Age: 45
End: 2020-04-30

## 2020-04-30 NOTE — TELEPHONE ENCOUNTER
Pt requested refill of   Butalbital-APAP-Caffeine (FIORICET) -40 MG Oral Cap 30 capsule     She asked that her dosage be increased because she said she would have to take more capsules to achieve the desired result.     Please send to:  [facility] Co[facility]ran

## 2020-05-01 ENCOUNTER — VIRTUAL PHONE E/M (OUTPATIENT)
Dept: FAMILY MEDICINE CLINIC | Facility: CLINIC | Age: 45
End: 2020-05-01

## 2020-05-01 VITALS — BODY MASS INDEX: 44 KG/M2 | WEIGHT: 240 LBS

## 2020-05-01 DIAGNOSIS — Z91.09 ENVIRONMENTAL ALLERGIES: ICD-10-CM

## 2020-05-01 DIAGNOSIS — K21.9 GASTROESOPHAGEAL REFLUX DISEASE, ESOPHAGITIS PRESENCE NOT SPECIFIED: ICD-10-CM

## 2020-05-01 DIAGNOSIS — I49.1 PAC (PREMATURE ATRIAL CONTRACTION): ICD-10-CM

## 2020-05-01 DIAGNOSIS — G43.901 MIGRAINE WITH STATUS MIGRAINOSUS, NOT INTRACTABLE, UNSPECIFIED MIGRAINE TYPE: Primary | ICD-10-CM

## 2020-05-01 DIAGNOSIS — Z79.899 MEDICATION MANAGEMENT: ICD-10-CM

## 2020-05-01 DIAGNOSIS — E55.9 VITAMIN D DEFICIENCY: ICD-10-CM

## 2020-05-01 DIAGNOSIS — E66.01 MORBID OBESITY WITH BMI OF 40.0-44.9, ADULT (HCC): ICD-10-CM

## 2020-05-01 DIAGNOSIS — R73.03 PREDIABETES: ICD-10-CM

## 2020-05-01 PROCEDURE — 99214 OFFICE O/P EST MOD 30 MIN: CPT | Performed by: FAMILY MEDICINE

## 2020-05-01 RX ORDER — ACETAMINOPHEN 160 MG
2000 TABLET,DISINTEGRATING ORAL DAILY
COMMUNITY

## 2020-05-01 RX ORDER — BUTALBITAL, ACETAMINOPHEN AND CAFFEINE 300; 40; 50 MG/1; MG/1; MG/1
2 CAPSULE ORAL EVERY 6 HOURS PRN
Qty: 60 CAPSULE | Refills: 0 | Status: SHIPPED | OUTPATIENT
Start: 2020-05-01 | End: 2020-05-15

## 2020-05-01 NOTE — PROGRESS NOTES
Virtual Telephone Check-In    Rosa Marsh verbally consents to a Virtual/Telephone Check-In visit on 05/01/20. Patient understands and accepts financial responsibility for any deductible, co-insurance and/or co-pays associated with this service.     D Disp: 90 tablet, Rfl: 1  Fexofenadine HCl 180 MG Oral Tab, Take 180 mg by mouth daily as needed. , Disp: , Rfl:   Metoprolol Succinate ER 25 MG Oral Tablet 24 Hr, 25 mg daily. , Disp: , Rfl: 0  ergocalciferol 63319 units Oral Cap, Take 1 capsule (50,000 Un kg)   BMI 43.90 kg/m²   Unable to assess vitals during tele visit    GENERAL: AAO x 3; pleasant; speaking in full sentences  ASSESSMENT AND PLAN:   Migraine with status migrainosus, not intractable, unspecified migraine type  (primary encounter diagnosis) performed. Every conscious effort was taken to allow for sufficient and adequate time. The billing was spent on reviewing labs, medications, radiology tests, and decision making.   Appropriate medical decision making and tests are ordered as detailed in t

## 2020-05-28 ENCOUNTER — VIRTUAL PHONE E/M (OUTPATIENT)
Dept: FAMILY MEDICINE CLINIC | Facility: CLINIC | Age: 45
End: 2020-05-28

## 2020-05-28 DIAGNOSIS — M25.512 ACUTE PAIN OF LEFT SHOULDER: Primary | ICD-10-CM

## 2020-05-28 PROCEDURE — 99213 OFFICE O/P EST LOW 20 MIN: CPT | Performed by: FAMILY MEDICINE

## 2020-05-28 RX ORDER — DICLOFENAC SODIUM 75 MG/1
75 TABLET, DELAYED RELEASE ORAL 2 TIMES DAILY
Qty: 30 TABLET | Refills: 0 | Status: SHIPPED | OUTPATIENT
Start: 2020-05-28 | End: 2022-01-17

## 2020-05-28 NOTE — PROGRESS NOTES
Virtual Telephone Check-In    Rosa Marsh verbally consents to a Virtual/Telephone Check-In visit on 05/28/20. Patient has been referred to the [facility] website at [email or web address] to review the yearly Consent to Treat document.     Patient Alison this visit.       No Known Allergies   Past Medical History:   Diagnosis Date   • Acute pharyngitis    • Esophageal reflux    • Ganglion of tendon sheath    • Migraines    • Palpitations    • Personal history of urinary calculi    • Routine gynecological ex Imaging & Consults:  OP REFERRAL TO [facility]     Advised diclofenac 75 mg BID x 10 days with food. Monitor. Ice prn. Consider PT if not improving.       The patient indicates understanding of these issues and agrees to the pl

## 2020-06-08 ENCOUNTER — TELEPHONE (OUTPATIENT)
Dept: FAMILY MEDICINE CLINIC | Facility: CLINIC | Age: 45
End: 2020-06-08

## 2020-06-08 DIAGNOSIS — M25.512 ACUTE PAIN OF LEFT SHOULDER: Primary | ICD-10-CM

## 2020-06-08 NOTE — TELEPHONE ENCOUNTER
Pt had televisit w/Dr. Nieves Lopez for shoulder pain. Pt is asking if she can get an x-ray of her shoulder; it is still doing a lot of popping, pain has gotten better because she is on an anti-inflammatory.     Pt is waiting to hear back from PT to schedule an

## 2020-06-09 ENCOUNTER — OFFICE VISIT (OUTPATIENT)
Dept: INTERNAL MEDICINE CLINIC | Facility: CLINIC | Age: 45
End: 2020-06-09

## 2020-06-09 ENCOUNTER — HOSPITAL ENCOUNTER (OUTPATIENT)
Dept: GENERAL RADIOLOGY | Age: 45
Discharge: HOME | End: 2020-06-09
Attending: FAMILY MEDICINE
Payer: COMMERCIAL

## 2020-06-09 VITALS
DIASTOLIC BLOOD PRESSURE: 88 MMHG | TEMPERATURE: 98 F | WEIGHT: 243 LBS | SYSTOLIC BLOOD PRESSURE: 126 MMHG | RESPIRATION RATE: 16 BRPM | BODY MASS INDEX: 44.72 KG/M2 | HEART RATE: 72 BPM | HEIGHT: 62 IN

## 2020-06-09 DIAGNOSIS — M25.512 ACUTE PAIN OF LEFT SHOULDER: ICD-10-CM

## 2020-06-09 DIAGNOSIS — G43.109 MIGRAINE WITH AURA AND WITHOUT STATUS MIGRAINOSUS, NOT INTRACTABLE: ICD-10-CM

## 2020-06-09 DIAGNOSIS — R73.03 PREDIABETES: ICD-10-CM

## 2020-06-09 DIAGNOSIS — E55.9 VITAMIN D DEFICIENCY: ICD-10-CM

## 2020-06-09 DIAGNOSIS — Z91.89 CARDIOVASCULAR RISK FACTOR: ICD-10-CM

## 2020-06-09 DIAGNOSIS — E66.01 MORBID OBESITY WITH BMI OF 40.0-44.9, ADULT (HCC): ICD-10-CM

## 2020-06-09 DIAGNOSIS — Z51.81 ENCOUNTER FOR THERAPEUTIC DRUG MONITORING: Primary | ICD-10-CM

## 2020-06-09 PROCEDURE — 99214 OFFICE O/P EST MOD 30 MIN: CPT | Performed by: NURSE PRACTITIONER

## 2020-06-09 PROCEDURE — 73030 X-RAY EXAM OF SHOULDER: CPT | Performed by: FAMILY MEDICINE

## 2020-06-09 RX ORDER — SEMAGLUTIDE 1.34 MG/ML
0.5 INJECTION, SOLUTION SUBCUTANEOUS WEEKLY
Qty: 1 PEN | Refills: 1 | Status: SHIPPED | OUTPATIENT
Start: 2020-06-09 | End: 2020-07-07

## 2020-06-09 NOTE — PROGRESS NOTES
Tanya Pop is a 45year old female presents today to restart medical weight loss program for the treatment of overweight, obesity, or morbid obesity.     S:  Current weight Wt Readings from Last 6 Encounters:  06/09/20 : 243 lb (110.2 kg)  05/01/20 : 24 S2 without clicks or gallops, no pedal edema.   GI: +BS  NEURO/MS: motor and sensory grossly intact  PSYCH: pleasant, cooperative, normal mood and affect    ASSESSMENT AND PLAN:  Encounter for therapeutic drug monitoring  (primary encounter diagnosis)  Morb on good nutrition, regular exercise and stress management. Medication Plan: Switch from Topamax 50 mg nightly to Trokendi  mg daily due to side effects of brain fog with Topamax. This will help control cravings while maintaining migraine control.

## 2020-06-09 NOTE — PROGRESS NOTES
Patient teaching on Ozempic performed. Patient demonstrated back, all questions were answered and patient verbalized understanding.  ABRIL

## 2020-06-09 NOTE — PATIENT INSTRUCTIONS
Welcome back! Continue making lifestyle changes that focus on good nutrition, regular exercise and stress management. Medication Plan: Switch from Topamax 50 mg nightly to Trokendi  mg daily due to side effects of brain fog with Topamax.  This will

## 2020-06-10 ENCOUNTER — TELEPHONE (OUTPATIENT)
Dept: INTERNAL MEDICINE CLINIC | Facility: CLINIC | Age: 45
End: 2020-06-10

## 2020-06-17 ENCOUNTER — OFFICE VISIT (OUTPATIENT)
Dept: PHYSICAL THERAPY | Facility: HOSPITAL | Age: 45
End: 2020-06-17
Attending: FAMILY MEDICINE
Payer: COMMERCIAL

## 2020-06-17 DIAGNOSIS — M25.512 ACUTE PAIN OF LEFT SHOULDER: ICD-10-CM

## 2020-06-17 PROCEDURE — 97140 MANUAL THERAPY 1/> REGIONS: CPT

## 2020-06-17 PROCEDURE — 97110 THERAPEUTIC EXERCISES: CPT

## 2020-06-17 PROCEDURE — 97161 PT EVAL LOW COMPLEX 20 MIN: CPT

## 2020-06-17 NOTE — PROGRESS NOTES
SHOULDER EVALUATION:   Referring Physician: Dr. Vásquez  Diagnosis: Acute pain of left shoulder (M25.512)     Date of Service: 6/17/2020     PATIENT SUMMARY   Ines Osler is a 45year old female who presents to therapy today with complaints of L should night.  Signs and symptoms are consistent with diagnosis of L shoulder internal impingement. Pt and PT discussed evaluation findings, pathology, POC and HEP. Pt voiced understanding and performs HEP correctly without reported pain.  Skilled Physical Therap for:   Access Code: [email or web address]   URL: [email or web address]   Date: 06/17/2020   Prepared by: Abdelrahman Alvarez     Exercises   Sleeper Stretch - 3 reps - 1 sets - 45 sec hold - 1x daily - 7x weekly   Standing Row with Anchored Resistance - 10 reps - treatment options and has agreed to actively participate in planning and for this course of care. Thank you for your referral. Please co-sign or sign and return this letter via fax as soon as possible to [phone].  If you have any questions, please c

## 2020-06-19 ENCOUNTER — OFFICE VISIT (OUTPATIENT)
Dept: PHYSICAL THERAPY | Facility: HOSPITAL | Age: 45
End: 2020-06-19
Attending: FAMILY MEDICINE
Payer: COMMERCIAL

## 2020-06-19 PROCEDURE — 97110 THERAPEUTIC EXERCISES: CPT

## 2020-06-19 PROCEDURE — 97140 MANUAL THERAPY 1/> REGIONS: CPT

## 2020-06-19 NOTE — PROGRESS NOTES
Dx: Acute pain of left shoulder (M25.512)            Insurance (Authorized # of Visits):  8     5/28 - 8/26      Authorizing Physician: Dr. Vásquez  Next MD visit: none scheduled  Fall Risk: standard         Precautions: n/a             Subjective: L jessicaul shoulder PA glide Gr III in prone 3 x 10        Hor ABD with ER in prone 3 x 10 (B) with progressive decrease in the amount of cuing for scapular retraction and ER       L upper trap release 2'       L open book x 15       L shoulder ER in sdly #2 3 x 10

## 2020-06-24 ENCOUNTER — OFFICE VISIT (OUTPATIENT)
Dept: PHYSICAL THERAPY | Facility: HOSPITAL | Age: 45
End: 2020-06-24
Attending: FAMILY MEDICINE
Payer: COMMERCIAL

## 2020-06-24 PROCEDURE — 97112 NEUROMUSCULAR REEDUCATION: CPT

## 2020-06-24 PROCEDURE — 97110 THERAPEUTIC EXERCISES: CPT

## 2020-06-24 NOTE — PROGRESS NOTES
Dx: Acute pain of left shoulder (M25.512)            Insurance (Authorized # of Visits):  8     5/28 - 8/26      Authorizing Physician: Dr. Christian Mckeon  Next MD visit: none scheduled  Fall Risk: standard         Precautions: n/a             Subjective: L jessicaul TX#: 4/ Date:                 TX#: 5/ Date:    Tx#: 6/   L shoulder PROM with end-range stretching L shoulder PROM with end-range stretching      L shoulder sleeper stretch 3 x 1' L shoulder sleeper stretch 3 x 1'      L shoulder PA glide Gr III in pr there ex x 2       Total Timed Treatment: 40 min  Total Treatment Time: 40 min

## 2020-07-01 ENCOUNTER — OFFICE VISIT (OUTPATIENT)
Dept: PHYSICAL THERAPY | Facility: HOSPITAL | Age: 45
End: 2020-07-01
Attending: FAMILY MEDICINE
Payer: COMMERCIAL

## 2020-07-01 PROCEDURE — 97112 NEUROMUSCULAR REEDUCATION: CPT

## 2020-07-01 PROCEDURE — 97140 MANUAL THERAPY 1/> REGIONS: CPT

## 2020-07-01 PROCEDURE — 97110 THERAPEUTIC EXERCISES: CPT

## 2020-07-01 NOTE — PROGRESS NOTES
Dx: Acute pain of left shoulder (M25.512)            Insurance (Authorized # of Visits):  8     5/28 - 8/26      Authorizing Physician: Dr. Mayo Eastman  Next MD visit: none scheduled  Fall Risk: standard         Precautions: n/a             Subjective: L jessicaul with lifting and reaching   · Pt will be independent and compliant with comprehensive HEP to maintain progress achieved in PT       Plan: posterior capsule stretching; L AXEL Reyes and scapular-thoracic strengthening  Date: 6/19/2020  TX#: 2/8 Date: off       Review of self-caudal glide and posterior capsule stretch to address symptoms of shoulder strain/stiffness. HEP: issued at [facility]  Access Code: [email or web address]   URL: [email or web address]   Date: 06/24/2020   Prepared by: Janina Brunson

## 2020-07-03 ENCOUNTER — OFFICE VISIT (OUTPATIENT)
Dept: PHYSICAL THERAPY | Facility: HOSPITAL | Age: 45
End: 2020-07-03
Attending: FAMILY MEDICINE
Payer: COMMERCIAL

## 2020-07-03 PROCEDURE — 97112 NEUROMUSCULAR REEDUCATION: CPT

## 2020-07-03 PROCEDURE — 97140 MANUAL THERAPY 1/> REGIONS: CPT

## 2020-07-03 PROCEDURE — 97110 THERAPEUTIC EXERCISES: CPT

## 2020-07-03 NOTE — PROGRESS NOTES
Dx: Acute pain of left shoulder (M25.512)            Insurance (Authorized # of Visits):  8     5/28 - 8/26      Authorizing Physician: Dr. Sahra Mcintosh  Next MD visit: none scheduled  Fall Risk: standard         Precautions: n/a             Subjective: L shoul AXEL klein and scapular-thoracic strengthening  Date: 6/19/2020  TX#: 2/8 Date:  6/24/2020            TX#: 3/8 Date:   7/1/2020             TX#: 4/8 Date:  7/3/2020              TX#: 5/8 Date:    Tx#: 6/   L shoulder PROM with end-range stretching L shoulde off Red TB low rows 2 x 10  Push ups at the wall elbows close 2 x 10   RTB lateral glides x 12 L/R  RTB lateral steps x 12 L/R  RTB spiderman walk on the wall  R shoulder flexion on the wall with lift off Green TB low rows 2 x 10  Push ups at the wall elbo

## 2020-07-07 ENCOUNTER — OFFICE VISIT (OUTPATIENT)
Dept: INTERNAL MEDICINE CLINIC | Facility: CLINIC | Age: 45
End: 2020-07-07

## 2020-07-07 VITALS
HEART RATE: 80 BPM | RESPIRATION RATE: 16 BRPM | SYSTOLIC BLOOD PRESSURE: 122 MMHG | TEMPERATURE: 98 F | WEIGHT: 238 LBS | DIASTOLIC BLOOD PRESSURE: 80 MMHG | HEIGHT: 62 IN | BODY MASS INDEX: 43.79 KG/M2

## 2020-07-07 DIAGNOSIS — G43.109 MIGRAINE WITH AURA AND WITHOUT STATUS MIGRAINOSUS, NOT INTRACTABLE: ICD-10-CM

## 2020-07-07 DIAGNOSIS — Z91.89 CARDIOVASCULAR RISK FACTOR: ICD-10-CM

## 2020-07-07 DIAGNOSIS — R73.03 PREDIABETES: ICD-10-CM

## 2020-07-07 DIAGNOSIS — E66.01 MORBID OBESITY WITH BMI OF 40.0-44.9, ADULT (HCC): ICD-10-CM

## 2020-07-07 DIAGNOSIS — Z51.81 ENCOUNTER FOR THERAPEUTIC DRUG MONITORING: Primary | ICD-10-CM

## 2020-07-07 PROCEDURE — 99213 OFFICE O/P EST LOW 20 MIN: CPT | Performed by: NURSE PRACTITIONER

## 2020-07-07 RX ORDER — SEMAGLUTIDE 1.34 MG/ML
0.5 INJECTION, SOLUTION SUBCUTANEOUS WEEKLY
Qty: 1 PEN | Refills: 1 | COMMUNITY
Start: 2020-07-07 | End: 2020-09-30

## 2020-07-07 NOTE — PROGRESS NOTES
Terrance Ledesma is a 39year old female presents today to f/u on medical weight loss program for the treatment of overweight, obesity, or morbid obesity.     S:  Current weight Wt Readings from Last 6 Encounters:  07/07/20 : 238 lb (108 kg)  06/09/20 : 243 l diagnosis)  Morbid obesity with bmi of 40.0-44.9, adult (hcc)  Migraine with aura and without status migrainosus, not intractable  Prediabetes  Cardiovascular risk factor    No orders of the defined types were placed in this encounter.       Meds & Refills with weight. It will teach you about the primal purpose of food, the effect certain foods have on health, and how to listen carefully to what your body is trying to tell you. It Starts with Cells  Did you know your body has more than 35 trillion cells?  Ea that food helps or hurts your body. This is a part of living mindful and being knowledgeable about what you consume regularly.   When you start to see food as fuel, not just a tasty treat or the solution to a bad temper, you will start to make healthier cho of good. If you struggle with mental health disorders or need help concentrating on work or school, you'll probably notice a huge difference — just by being more mindful of your diet!   These foods can help you stay happy, healthy and focused:  Benjamin Alexandre unfocused or deprived. Moderation is always key! Medication use and SEs reviewed with patient. Return in about 6 weeks (around 8/18/2020) for weight management. Patient verbalizes understanding.

## 2020-07-07 NOTE — PATIENT INSTRUCTIONS
Welcome back! Continue making lifestyle changes that focus on good nutrition, regular exercise and stress management. Medication Plan: Continue current medication regimen- monitor migraine frequency over the next 2 months.  Increase Ozempic to .5 mg week why healthy food choices are so important. Once you can start to see food as being fuel for your body, you will get better at making the healthy choice the easy choice.  Food will be less about rewards or punishments and more about supporting your overall too.  The more a food is nutritionally sound, the more it can feed your brain key nutrients. These include vitamins, minerals, proteins, electrolytes and more. All of these nutrients have a role in the human body, including brain activity.  They affect proc bok hector, turnips, cabbage and [place] sprouts. Leafy greens like spinach and kale are also great for supporting brain health. Nuts and Seeds  Just like fish, nuts and seeds have a lot of Omega 3 fatty acids.  They also have antioxidant properties that im

## 2020-07-08 ENCOUNTER — OFFICE VISIT (OUTPATIENT)
Dept: PHYSICAL THERAPY | Facility: HOSPITAL | Age: 45
End: 2020-07-08
Attending: FAMILY MEDICINE
Payer: COMMERCIAL

## 2020-07-08 ENCOUNTER — TELEPHONE (OUTPATIENT)
Dept: INTERNAL MEDICINE CLINIC | Facility: CLINIC | Age: 45
End: 2020-07-08

## 2020-07-08 PROCEDURE — 97140 MANUAL THERAPY 1/> REGIONS: CPT

## 2020-07-08 PROCEDURE — 97110 THERAPEUTIC EXERCISES: CPT

## 2020-07-08 NOTE — PROGRESS NOTES
Dx: Acute pain of left shoulder (M25.512)            Insurance (Authorized # of Visits):  8     5/28 - 8/26      Authorizing Physician: Dr. Ирина Kay  Next MD visit: none scheduled  Fall Risk: standard         Precautions: n/a             Subjective: Slept a 2/8 Date:  6/24/2020            TX#: 3/8 Date:   7/1/2020             TX#: 4/8 Date:  7/3/2020              TX#: 5/8 Date: 7/8/2020  Tx#: 6/8   L shoulder PROM with end-range stretching L shoulder PROM with end-range stretching L shoulder PROM with end-ran glides  RTB lateral steps Red TB low rows 2 x 10  Push ups at the wall elbows close 2 x 10   RTB lateral glides x 12 L/R  RTB lateral steps x 12 L/R  RTB spiderman walk on the wall  R shoulder flexion on the wall with lift off Red TB low rows 2 x 10  Push

## 2020-07-10 ENCOUNTER — APPOINTMENT (OUTPATIENT)
Dept: PHYSICAL THERAPY | Facility: HOSPITAL | Age: 45
End: 2020-07-10
Attending: FAMILY MEDICINE
Payer: COMMERCIAL

## 2020-07-10 ENCOUNTER — TELEPHONE (OUTPATIENT)
Dept: PHYSICAL THERAPY | Age: 45
End: 2020-07-10

## 2020-07-27 ENCOUNTER — OFFICE VISIT (OUTPATIENT)
Dept: FAMILY MEDICINE CLINIC | Facility: CLINIC | Age: 45
End: 2020-07-27

## 2020-07-27 ENCOUNTER — TELEPHONE (OUTPATIENT)
Dept: FAMILY MEDICINE CLINIC | Facility: CLINIC | Age: 45
End: 2020-07-27

## 2020-07-27 VITALS
TEMPERATURE: 98 F | DIASTOLIC BLOOD PRESSURE: 70 MMHG | SYSTOLIC BLOOD PRESSURE: 110 MMHG | RESPIRATION RATE: 14 BRPM | BODY MASS INDEX: 43.98 KG/M2 | HEART RATE: 80 BPM | HEIGHT: 62 IN | WEIGHT: 239 LBS

## 2020-07-27 DIAGNOSIS — T88.7XXA MEDICATION SIDE EFFECT: ICD-10-CM

## 2020-07-27 DIAGNOSIS — R11.0 NAUSEA: Primary | ICD-10-CM

## 2020-07-27 DIAGNOSIS — K21.9 GASTROESOPHAGEAL REFLUX DISEASE, ESOPHAGITIS PRESENCE NOT SPECIFIED: ICD-10-CM

## 2020-07-27 PROCEDURE — 3008F BODY MASS INDEX DOCD: CPT | Performed by: FAMILY MEDICINE

## 2020-07-27 PROCEDURE — 99214 OFFICE O/P EST MOD 30 MIN: CPT | Performed by: FAMILY MEDICINE

## 2020-07-27 PROCEDURE — 3078F DIAST BP <80 MM HG: CPT | Performed by: FAMILY MEDICINE

## 2020-07-27 PROCEDURE — 3074F SYST BP LT 130 MM HG: CPT | Performed by: FAMILY MEDICINE

## 2020-07-27 RX ORDER — ONDANSETRON 4 MG/1
4 TABLET, FILM COATED ORAL EVERY 8 HOURS PRN
Qty: 21 TABLET | Refills: 0 | Status: SHIPPED | OUTPATIENT
Start: 2020-07-27 | End: 2022-01-17

## 2020-07-27 NOTE — TELEPHONE ENCOUNTER
S/w pt. Reports having intermittent nausea watery stools since Friday. No blood. Reports it was after having something to eat. No vomiting. Also report intermittent L mid abd pain. Varies from crampy to shooting pains. No fevers. No cough. No sob.

## 2020-07-27 NOTE — TELEPHONE ENCOUNTER
What symptoms is the patient experiencing?:    Nausea, diarrhea    Has the patient had ANY travel within the last 30 days (domestic or international - please list) - IF YES, SEND TO TRIAGE?     NO    Has the patient had contact with any person who has confi

## 2020-07-27 NOTE — PROGRESS NOTES
Luisa Dean is a 39year old female. HPI:   Pt. Complains of diarrhea after eating about 1.5 weeks ago and then on Friday. First time was a sandwich from [employer] and second time was seafood.   He diarrhea was pure water for about 4 episodes, then form unspecified endocrine, nutritional, metabolic, and immunity disorders    • Screening for other and unspecified genitourinary condition    • Screening for thyroid disorder    • Unspecified sinusitis (chronic)       Social History:  Social History    Tobacco 1 tablet (4 mg total) by mouth every 8 (eight) hours as needed for Nausea. Imaging & Consults:  None   Advised to increase omeprazole to 40 mg twice daily. Advised Zofran if needed. Advised bland diet. Advised to monitor symptoms.   Discussed diver

## 2020-08-12 ENCOUNTER — OFFICE VISIT (OUTPATIENT)
Dept: PHYSICAL THERAPY | Facility: HOSPITAL | Age: 45
End: 2020-08-12
Attending: FAMILY MEDICINE
Payer: COMMERCIAL

## 2020-08-12 PROCEDURE — 97110 THERAPEUTIC EXERCISES: CPT

## 2020-08-12 PROCEDURE — 97140 MANUAL THERAPY 1/> REGIONS: CPT

## 2020-08-12 NOTE — PROGRESS NOTES
Discharge Summary  Pt has attended 7 visits in Physical Therapy. Subjective: Patient states that L shoulder feels at 85 to 90% compared to the right side. It just feels a bit stiff in the mornings.  She notices increase in stiffness if stops her exerc improve shoulder flexion AROM to >160 degrees to be able to reach into overhead cabinets without pain or restriction. Met   · Pt will improve shoulder abduction AROM to >160 degrees to improve ability to don deodorant, don/doff shirts, and wash hair.  Met cervical side-benders stretch 3 x 30 sec, L  L cervical side-benders stretch 3 x 30 sec, L L cervical side-benders stretch 3 x 30 sec, L  L cervical side-benders stretch 3 x 30 sec, L   Hor ABD with ER in prone 3 x 10 (B) with progressive decrease in the a off FTS cable (B) #15  Alternate punches x 20  (B) rows x 20    GTB lateral steps x 12 L/R  Lateral glides x 12  Spiderman x 10 L/R FTS cable (B) #15  Alternate punches x 20  (B) rows x 20    GTB lateral steps x 12 L/R  Lateral glides x 12  Spiderman x 10

## 2020-08-24 NOTE — PROGRESS NOTES
Sheena Opitz is a 39year old female presents today to f/u on medical weight loss program for the treatment of overweight, obesity, or morbid obesity.     S:  Current weight Wt Readings from Last 6 Encounters:  08/25/20 : 238 lb (108 kg)  07/27/20 : 239 l pleasant, cooperative, normal mood and affect    ASSESSMENT AND PLAN:  Encounter for therapeutic drug monitoring  (primary encounter diagnosis)  Morbid obesity with bmi of 40.0-44.9, adult (hcc)  Migraine with aura and without status migrainosus, not intra makes us obese despite our rich lifestyles. Obesity is nothing but an excess of body weight caused due to overeating, overconsumption of calories, or a lack of physical activity. Stress, genetics, toxins and medicinal side effects can also lead to obesity. withdrawal symptom. A common example of this is the intake of coffee. There are people who are habitual of drinking 3-5 cups of coffee every day. When they don’t get their regular dose of coffee, they start experiencing headaches.  These withdrawal symptoms have it once in a while. But make sure that the amount you consume should be just for pleasure and not for filling up your stomach to the full. Blog post from www. [employer]        Medication use and SEs reviewed with patient.     Return in about 1

## 2020-08-25 ENCOUNTER — OFFICE VISIT (OUTPATIENT)
Dept: INTERNAL MEDICINE CLINIC | Facility: CLINIC | Age: 45
End: 2020-08-25

## 2020-08-25 ENCOUNTER — TELEPHONE (OUTPATIENT)
Dept: FAMILY MEDICINE CLINIC | Facility: CLINIC | Age: 45
End: 2020-08-25

## 2020-08-25 VITALS
HEART RATE: 68 BPM | BODY MASS INDEX: 43.79 KG/M2 | HEIGHT: 62 IN | WEIGHT: 238 LBS | RESPIRATION RATE: 16 BRPM | DIASTOLIC BLOOD PRESSURE: 72 MMHG | SYSTOLIC BLOOD PRESSURE: 118 MMHG | TEMPERATURE: 97 F

## 2020-08-25 DIAGNOSIS — M77.31 CALCANEAL SPUR OF RIGHT FOOT: Primary | ICD-10-CM

## 2020-08-25 DIAGNOSIS — E66.01 MORBID OBESITY WITH BMI OF 40.0-44.9, ADULT (HCC): ICD-10-CM

## 2020-08-25 DIAGNOSIS — R73.03 PREDIABETES: ICD-10-CM

## 2020-08-25 DIAGNOSIS — G43.109 MIGRAINE WITH AURA AND WITHOUT STATUS MIGRAINOSUS, NOT INTRACTABLE: ICD-10-CM

## 2020-08-25 DIAGNOSIS — Z51.81 ENCOUNTER FOR THERAPEUTIC DRUG MONITORING: Primary | ICD-10-CM

## 2020-08-25 DIAGNOSIS — Z91.89 CARDIOVASCULAR RISK FACTOR: ICD-10-CM

## 2020-08-25 PROCEDURE — 3078F DIAST BP <80 MM HG: CPT | Performed by: NURSE PRACTITIONER

## 2020-08-25 PROCEDURE — 3074F SYST BP LT 130 MM HG: CPT | Performed by: NURSE PRACTITIONER

## 2020-08-25 PROCEDURE — 99213 OFFICE O/P EST LOW 20 MIN: CPT | Performed by: NURSE PRACTITIONER

## 2020-08-25 PROCEDURE — 3008F BODY MASS INDEX DOCD: CPT | Performed by: NURSE PRACTITIONER

## 2020-08-25 RX ORDER — SEMAGLUTIDE 1.34 MG/ML
0.5 INJECTION, SOLUTION SUBCUTANEOUS WEEKLY
Qty: 1 PEN | Refills: 1 | Status: CANCELLED | OUTPATIENT
Start: 2020-08-25

## 2020-08-25 NOTE — TELEPHONE ENCOUNTER
Referral to [facility] Podiatrist (INTERNAL)    Pt calling to ask for a new referral:    PROVIDER: Katheryn Hendrix    DX: M77.31 (ICD-10-CM) - 726.73 (ICD-9-CM) - Calcaneal spur of foot, right

## 2020-08-25 NOTE — PATIENT INSTRUCTIONS
Welcome back! Continue making lifestyle changes that focus on good nutrition, regular exercise and stress management. Medication Plan: Continue current medication regimen, remaining off Ozempic at this time.     Agreed upon goal/s before next office visi needs more food which makes you feel hungry. Because of this, you tend to mindlessly consume more unhealthy food. The scary part is that this process keeps repeating itself.   The reason for this toxic hunger is that these fat cells are always starving whic seeds, nuts, and green leafy vegetables are high in protein and low in calories and fat. Increasing their consumption will eventually help you to cope with the toxic hunger.  This will ultimately help to reduce the food cravings as well as treat obesity by

## 2020-08-28 ENCOUNTER — LAB ENCOUNTER (OUTPATIENT)
Dept: LAB | Age: 45
End: 2020-08-28
Attending: FAMILY MEDICINE
Payer: COMMERCIAL

## 2020-08-28 DIAGNOSIS — Z13.89 SCREENING FOR GENITOURINARY CONDITION: ICD-10-CM

## 2020-08-28 DIAGNOSIS — E55.9 VITAMIN D DEFICIENCY: ICD-10-CM

## 2020-08-28 DIAGNOSIS — R73.03 PREDIABETES: ICD-10-CM

## 2020-08-28 LAB
ALBUMIN SERPL-MCNC: 3.5 G/DL (ref 3.4–5)
ALBUMIN/GLOB SERPL: 0.9 {RATIO} (ref 1–2)
ALP LIVER SERPL-CCNC: 113 U/L (ref 37–98)
ALT SERPL-CCNC: 22 U/L (ref 13–56)
ANION GAP SERPL CALC-SCNC: 4 MMOL/L (ref 0–18)
AST SERPL-CCNC: 13 U/L (ref 15–37)
BILIRUB SERPL-MCNC: 0.6 MG/DL (ref 0.1–2)
BILIRUB UR QL STRIP.AUTO: NEGATIVE
BUN BLD-MCNC: 13 MG/DL (ref 7–18)
BUN/CREAT SERPL: 19.1 (ref 10–20)
CALCIUM BLD-MCNC: 8.5 MG/DL (ref 8.5–10.1)
CHLORIDE SERPL-SCNC: 108 MMOL/L (ref 98–112)
CO2 SERPL-SCNC: 27 MMOL/L (ref 21–32)
CREAT BLD-MCNC: 0.68 MG/DL (ref 0.55–1.02)
EST. AVERAGE GLUCOSE BLD GHB EST-MCNC: 108 MG/DL (ref 68–126)
GLOBULIN PLAS-MCNC: 3.9 G/DL (ref 2.8–4.4)
GLUCOSE BLD-MCNC: 84 MG/DL (ref 70–99)
GLUCOSE UR STRIP.AUTO-MCNC: NEGATIVE MG/DL
HBA1C MFR BLD HPLC: 5.4 % (ref ?–5.7)
KETONES UR STRIP.AUTO-MCNC: NEGATIVE MG/DL
LEUKOCYTE ESTERASE UR QL STRIP.AUTO: NEGATIVE
M PROTEIN MFR SERPL ELPH: 7.4 G/DL (ref 6.4–8.2)
NITRITE UR QL STRIP.AUTO: NEGATIVE
OSMOLALITY SERPL CALC.SUM OF ELEC: 287 MOSM/KG (ref 275–295)
PATIENT FASTING Y/N/NP: YES
PH UR STRIP.AUTO: 6 [PH] (ref 4.5–8)
POTASSIUM SERPL-SCNC: 3.8 MMOL/L (ref 3.5–5.1)
PROT UR STRIP.AUTO-MCNC: 100 MG/DL
RBC UR QL AUTO: NEGATIVE
SODIUM SERPL-SCNC: 139 MMOL/L (ref 136–145)
SP GR UR STRIP.AUTO: 1.03 (ref 1–1.03)
UROBILINOGEN UR STRIP.AUTO-MCNC: 2 MG/DL
VIT D+METAB SERPL-MCNC: 22.1 NG/ML (ref 30–100)

## 2020-08-28 PROCEDURE — 36415 COLL VENOUS BLD VENIPUNCTURE: CPT

## 2020-08-28 PROCEDURE — 82306 VITAMIN D 25 HYDROXY: CPT

## 2020-08-28 PROCEDURE — 81001 URINALYSIS AUTO W/SCOPE: CPT

## 2020-08-28 PROCEDURE — 83036 HEMOGLOBIN GLYCOSYLATED A1C: CPT

## 2020-08-28 PROCEDURE — 80053 COMPREHEN METABOLIC PANEL: CPT

## 2020-08-31 ENCOUNTER — OFFICE VISIT (OUTPATIENT)
Dept: PHYSICAL THERAPY | Facility: HOSPITAL | Age: 45
End: 2020-08-31
Attending: FAMILY MEDICINE
Payer: COMMERCIAL

## 2020-08-31 PROCEDURE — 97110 THERAPEUTIC EXERCISES: CPT

## 2020-08-31 PROCEDURE — 97112 NEUROMUSCULAR REEDUCATION: CPT

## 2020-08-31 PROCEDURE — 97140 MANUAL THERAPY 1/> REGIONS: CPT

## 2020-08-31 NOTE — PROGRESS NOTES
Progress Summary  Pt has attended 7 visits in Physical Therapy. Subjective: Reports continued R instep pain. Reports intermittent sharp/shooting pain on R instep that gets worse when she is on her feet.  Otherwise this area generally feels achy and so performed with pain)  Knee Foot/Ankle   Flexion: R 5/5; L 5/5  Extension: R 5/5; L 5/5    DF: R 5/5; L 5/5  PF: R 2/5 with pain in weight-bearing; L 4/5  INV: R 4/5; L 5/5  EV: R 5/5; L 5/5  Great toe ext: R 4+/5; L 5/5       Gait: pt ambulates on level gr PT    Physician's certification required: Yes  Please co-sign or sign and return this letter via fax as soon as possible to [phone]. I certify the need for these services furnished under this plan of treatment and while under my care.     X__________ stretch 3x30\" (B) with verbal cues for WBing across lateral side of foot  Standing calf stretch 3x30\" (B) with verbal cues for WBing across lateral side of foot T board calf stretches  R SLB with rotation R x 12   Seated (B) arch lifts x2'  Seated (B) to

## 2020-09-01 DIAGNOSIS — E55.9 VITAMIN D DEFICIENCY: ICD-10-CM

## 2020-09-01 DIAGNOSIS — R82.90 ABNORMAL URINALYSIS: ICD-10-CM

## 2020-09-01 DIAGNOSIS — E55.9 VITAMIN D DEFICIENCY: Primary | ICD-10-CM

## 2020-09-01 DIAGNOSIS — R77.9 ELEVATED BLOOD PROTEIN: ICD-10-CM

## 2020-09-01 RX ORDER — ERGOCALCIFEROL 1.25 MG/1
CAPSULE ORAL
Qty: 13 CAPSULE | Refills: 0 | OUTPATIENT
Start: 2020-09-01

## 2020-09-01 RX ORDER — ERGOCALCIFEROL 1.25 MG/1
50000 CAPSULE ORAL WEEKLY
Qty: 8 CAPSULE | Refills: 0 | Status: SHIPPED | OUTPATIENT
Start: 2020-09-01 | End: 2020-10-01

## 2020-09-02 ENCOUNTER — OFFICE VISIT (OUTPATIENT)
Dept: PHYSICAL THERAPY | Facility: HOSPITAL | Age: 45
End: 2020-09-02
Attending: FAMILY MEDICINE
Payer: COMMERCIAL

## 2020-09-02 PROCEDURE — 97112 NEUROMUSCULAR REEDUCATION: CPT

## 2020-09-02 PROCEDURE — 97140 MANUAL THERAPY 1/> REGIONS: CPT

## 2020-09-02 PROCEDURE — 97110 THERAPEUTIC EXERCISES: CPT

## 2020-09-02 NOTE — PROGRESS NOTES
Dx: Posterior tibial tendon dysfunction (PTTD) of right lower extremity (L43.609)            Insurance (Authorized # of Visits):  6           Authorizing Physician: Dr. Angel Mesa MD visit: none scheduled  Fall Risk: standard         Precautions: n/a tendon and musculotendinous intersection IASTM x7'  (B) talocrural distraction grade III x4'  (B) talocrural posterior glide grade IV with DF mobilization x4' R Posterior tibial tendon and musculotendinous intersection IASTM x7'  (B) talocrural distraction arch lifts x2'  Seated (B) toe splaying 2x10 Seated (B) arch lifts x2'  Seated (B) toe splaying x2' Heel raises with ball squeeze x 15    SLS (B) x30\" Taping R ankle to improve proprioception Taping R ankle to improve proprioception Taping R ankle to impr

## 2020-09-08 ENCOUNTER — TELEPHONE (OUTPATIENT)
Dept: PHYSICAL THERAPY | Age: 45
End: 2020-09-08

## 2020-09-09 ENCOUNTER — TELEPHONE (OUTPATIENT)
Dept: PHYSICAL THERAPY | Age: 45
End: 2020-09-09

## 2020-09-09 ENCOUNTER — APPOINTMENT (OUTPATIENT)
Dept: PHYSICAL THERAPY | Facility: HOSPITAL | Age: 45
End: 2020-09-09
Attending: FAMILY MEDICINE
Payer: COMMERCIAL

## 2020-09-16 ENCOUNTER — APPOINTMENT (OUTPATIENT)
Dept: PHYSICAL THERAPY | Facility: HOSPITAL | Age: 45
End: 2020-09-16
Attending: FAMILY MEDICINE
Payer: COMMERCIAL

## 2020-09-16 ENCOUNTER — OFFICE VISIT (OUTPATIENT)
Dept: PHYSICAL THERAPY | Facility: HOSPITAL | Age: 45
End: 2020-09-16
Attending: PODIATRIST
Payer: COMMERCIAL

## 2020-09-16 PROCEDURE — 97110 THERAPEUTIC EXERCISES: CPT

## 2020-09-16 PROCEDURE — 97112 NEUROMUSCULAR REEDUCATION: CPT

## 2020-09-16 PROCEDURE — 97140 MANUAL THERAPY 1/> REGIONS: CPT

## 2020-09-16 NOTE — PROGRESS NOTES
Dx: Posterior tibial tendon dysfunction (PTTD) of right lower extremity (Y16.551)            Insurance (Authorized # of Visits):  8 + 8      Authorizing Physician: Dr. Ashwini Mesa MD visit: none scheduled  Fall Risk: standard         Precautions: n/a IASTM x7'  (B) talocrural distraction grade III x4'  (B) talocrural posterior glide grade IV with DF mobilization x4' L Plantar fascia IASTM x6'  R Posterior tibial tendon and musculotendinous intersection IASTM x7'  (B) talocrural distraction grade III x4 Kinesiotape application    Eccentric PF with Blue TB 3 x 10  BTB inversion 3 x 10   Toe curl with active DF/PF 3 x 10   (B) heel raises x 12  Eccentric heel raises 1 x 12  T board A/P rocking x 20   Calf stretch   SLB   Seated (B) arch lifts x2'  Seated (B

## 2020-09-17 ENCOUNTER — OFFICE VISIT (OUTPATIENT)
Dept: PODIATRY CLINIC | Facility: CLINIC | Age: 45
End: 2020-09-17

## 2020-09-17 DIAGNOSIS — M21.869 GASTROCNEMIUS EQUINUS, UNSPECIFIED LATERALITY: ICD-10-CM

## 2020-09-17 DIAGNOSIS — M76.821 POSTERIOR TIBIAL TENDON DYSFUNCTION (PTTD) OF RIGHT LOWER EXTREMITY: Primary | ICD-10-CM

## 2020-09-17 DIAGNOSIS — M77.31 CALCANEAL SPUR OF RIGHT FOOT: ICD-10-CM

## 2020-09-17 PROCEDURE — 99213 OFFICE O/P EST LOW 20 MIN: CPT | Performed by: PODIATRIST

## 2020-09-17 NOTE — PROGRESS NOTES
Atul Martins is a 39year old female. Patient presents with: Ankle Pain: right ankle pain - pain scale 6/10 - 2nd round of physical therapy, but pain still present.         HPI:   Presents today still having pain in the foot secondary to tenderness along unspecified endocrine, nutritional, metabolic, and immunity disorders    • Screening for other and unspecified genitourinary condition    • Screening for thyroid disorder    • Unspecified sinusitis (chronic)       Past Surgical History:   Procedure Lateral normal.  Patient has taping on the foot from physical therapy still has tenderness along the course of the posterior tib tendon having failed to make great strides with 2 bouts of physical therapy I think we will get a immobilizer placed her into a cast kd

## 2020-09-18 ENCOUNTER — OFFICE VISIT (OUTPATIENT)
Dept: PHYSICAL THERAPY | Facility: HOSPITAL | Age: 45
End: 2020-09-18
Attending: FAMILY MEDICINE
Payer: COMMERCIAL

## 2020-09-18 PROCEDURE — 97140 MANUAL THERAPY 1/> REGIONS: CPT

## 2020-09-18 PROCEDURE — 97110 THERAPEUTIC EXERCISES: CPT

## 2020-09-18 NOTE — PROGRESS NOTES
Dx: Posterior tibial tendon dysfunction (PTTD) of right lower extremity (H09.052)            Insurance (Authorized # of Visits):  8 + 6      Authorizing Physician: Dr. Ирина Mesa MD visit: none scheduled  Fall Risk: standard         Precautions: n/a 1/22/2020  TX#: 2/8 Date: 1/27/2020  TX#: 3/8 Date:   1/29/2020           TX#: 4/8 Date: 2/3/2020  TX#: 5/8 Date: 2/5/2020  Tx#: 6/8 Date: 8/31/2020  Tx#: 7/8 Date: 9/2/2020  Tx#: 8/8 Date: 9/16/2020  Tx#: 9/16 Date: 9/18/2020  Tx#: 10/16   (B) Plantar fas 12'    Talocrural joint mobilization, Gr III  R subtalar inversion, Gr III  R plantar fascia and calf stretch, manual  R great toe mobilization, Gr III   Standing calf stretch 3x30\" (B) with verbal cues for WBing across lateral side of foot Standing calf stretch  -Seated arch lifts  -Resisted inversion with RTB. Access Code: [email or web address]   URL: [email or web address]   Date: 09/02/2020   Prepared by: Denis Carolina     Exercises   Long Sitting Eccentric Ankle Plantar Flexion with Resistance - 10 reps

## 2020-09-21 ENCOUNTER — APPOINTMENT (OUTPATIENT)
Dept: PHYSICAL THERAPY | Facility: HOSPITAL | Age: 45
End: 2020-09-21
Attending: FAMILY MEDICINE
Payer: COMMERCIAL

## 2020-09-23 ENCOUNTER — OFFICE VISIT (OUTPATIENT)
Dept: SURGERY | Facility: CLINIC | Age: 45
End: 2020-09-23

## 2020-09-23 ENCOUNTER — APPOINTMENT (OUTPATIENT)
Dept: PHYSICAL THERAPY | Facility: HOSPITAL | Age: 45
End: 2020-09-23
Attending: FAMILY MEDICINE
Payer: COMMERCIAL

## 2020-09-23 VITALS — WEIGHT: 248.5 LBS | BODY MASS INDEX: 45.73 KG/M2 | HEIGHT: 62 IN

## 2020-09-23 DIAGNOSIS — E66.01 CLASS 3 SEVERE OBESITY DUE TO EXCESS CALORIES WITH BODY MASS INDEX (BMI) OF 45.0 TO 49.9 IN ADULT, UNSPECIFIED WHETHER SERIOUS COMORBIDITY PRESENT (HCC): Primary | ICD-10-CM

## 2020-09-23 PROCEDURE — 97802 MEDICAL NUTRITION INDIV IN: CPT | Performed by: DIETITIAN, REGISTERED

## 2020-09-23 PROCEDURE — 3008F BODY MASS INDEX DOCD: CPT | Performed by: DIETITIAN, REGISTERED

## 2020-09-23 NOTE — PROGRESS NOTES
[facility]  [address] [place] [address]  Dept: [phone]  Loc: [phone]    09/23/20    Bariatric Initial Nutrition Assessment    Delma Sequeira is a 39year old female.     Referr (chronic)        Weight history:  Struggled with weight  most of life   Pt's highest adult weight 248 lbs at 38 y/o   Pt's lowest adult weight 160 lbs at 28 y/o  Reason given for weight gain:  Struggled with weight most of his/her life, Poor food choices a CREATSERUM 0.68 08/28/2020    ANIONGAP 4 08/28/2020     10/24/2017    GFRNAA 107 08/28/2020    GFRAA 124 08/28/2020    CA 8.5 08/28/2020    OSMOCALC 287 08/28/2020    ALKPHO 113 (H) 08/28/2020    AST 13 (L) 08/28/2020    ALT 22 08/28/2020    BILT 0. Subcutaneous Solution Pen-injector, Inject 0.5 mg into the skin once a week., Disp: 1 pen, Rfl: 1  •  Topiramate ER (TROKENDI XR) 100 MG Oral Capsule SR 24 Hr, Take 1 capsule by mouth daily. , Disp: 30 capsule, Rfl: 2  •  Diclofenac Sodium 75 MG Oral Tab EC caffeine: none  Patient currently consumes soda: none    Activity Level: Total body workout- strained back recently.  Some walking videos or walks around the neighborhood 3-4 days per week 45 minutes-1 hr    Other: Pt presents w/ interest in bariatric surge visits required to review concepts? Yes, and to monitor goals  Patient understands protein requirements? Introduced, will reinforce  Patient understand fluid requirements (amount and method of intake)? Yes  Patient understands post-operative diet?  Will ass

## 2020-09-30 ENCOUNTER — OFFICE VISIT (OUTPATIENT)
Dept: INTERNAL MEDICINE CLINIC | Facility: CLINIC | Age: 45
End: 2020-09-30

## 2020-09-30 VITALS
HEIGHT: 62 IN | DIASTOLIC BLOOD PRESSURE: 80 MMHG | SYSTOLIC BLOOD PRESSURE: 120 MMHG | HEART RATE: 80 BPM | WEIGHT: 246 LBS | BODY MASS INDEX: 45.27 KG/M2 | TEMPERATURE: 98 F | RESPIRATION RATE: 16 BRPM

## 2020-09-30 DIAGNOSIS — Z87.898 HISTORY OF PREDIABETES: ICD-10-CM

## 2020-09-30 DIAGNOSIS — E66.01 MORBID OBESITY WITH BMI OF 40.0-44.9, ADULT (HCC): ICD-10-CM

## 2020-09-30 DIAGNOSIS — G43.109 MIGRAINE WITH AURA AND WITHOUT STATUS MIGRAINOSUS, NOT INTRACTABLE: ICD-10-CM

## 2020-09-30 DIAGNOSIS — Z51.81 ENCOUNTER FOR THERAPEUTIC DRUG MONITORING: Primary | ICD-10-CM

## 2020-09-30 PROCEDURE — 3079F DIAST BP 80-89 MM HG: CPT | Performed by: NURSE PRACTITIONER

## 2020-09-30 PROCEDURE — 99213 OFFICE O/P EST LOW 20 MIN: CPT | Performed by: NURSE PRACTITIONER

## 2020-09-30 PROCEDURE — 3074F SYST BP LT 130 MM HG: CPT | Performed by: NURSE PRACTITIONER

## 2020-09-30 PROCEDURE — 3008F BODY MASS INDEX DOCD: CPT | Performed by: NURSE PRACTITIONER

## 2020-09-30 RX ORDER — TOPIRAMATE 100 MG/1
1 CAPSULE, EXTENDED RELEASE ORAL DAILY
Qty: 30 CAPSULE | Refills: 2 | Status: CANCELLED | OUTPATIENT
Start: 2020-09-30

## 2020-09-30 NOTE — PATIENT INSTRUCTIONS
Continue making lifestyle changes that focus on good nutrition, regular exercise and stress management. Medication Plan: Start Metformin 1 tab daily with meal for 7 days, then increase to 1 tab twice a day as prescribed.     Agreed upon goal/s before nex [email or web address]  · [employer], [employer], [place], [email or web address] - on line meal delivery programs  · [employer] in [place] for homemade meals to go @ [email or web address]  · Diet Doctor @ www. dietdoctor. com - low carb swaps    Stress Management/Behavior/

## 2020-09-30 NOTE — PROGRESS NOTES
Cheryl Reina is a 39year old female presents today to f/u on medical weight loss program for the treatment of overweight, obesity, or morbid obesity.     S:  Current weight Wt Readings from Last 6 Encounters:  09/30/20 : 246 lb (111.6 kg)  09/23/20 : 248 with bmi of 40.0-44.9, adult (hcc)  Migraine with aura and without status migrainosus, not intractable  History of prediabetes    No orders of the defined types were placed in this encounter.       Meds & Refills for this Visit:  Requested Prescriptions option to add personal training and small group fitness classes targeted at weight loss- [phone] and/or email @ [email or web address]  · [employer] @ [email or web address]  Personal training and Group Fitness classes with Sandra Carter Ulus Prader, MD  · [employer] - journal to better health (found at [employer] in fitness aisle)        Medication use and SEs reviewed with patient. Return in about 1 month (around 10/30/2020) for weight management.     Patient verbalizes understandin

## 2020-10-01 ENCOUNTER — TELEPHONE (OUTPATIENT)
Dept: INTERNAL MEDICINE CLINIC | Facility: CLINIC | Age: 45
End: 2020-10-01

## 2020-10-01 DIAGNOSIS — Z51.81 ENCOUNTER FOR THERAPEUTIC DRUG MONITORING: ICD-10-CM

## 2020-10-01 DIAGNOSIS — E66.01 MORBID OBESITY WITH BMI OF 40.0-44.9, ADULT (HCC): ICD-10-CM

## 2020-10-01 DIAGNOSIS — Z87.898 HISTORY OF PREDIABETES: ICD-10-CM

## 2020-10-01 NOTE — TELEPHONE ENCOUNTER
Pharmacy sent fax to request change to 90 day metformin. It is NOT required for coverage so denied 90 day with note to leave as written.

## 2020-10-05 ENCOUNTER — OFFICE VISIT (OUTPATIENT)
Dept: PHYSICAL THERAPY | Facility: HOSPITAL | Age: 45
End: 2020-10-05
Attending: FAMILY MEDICINE
Payer: COMMERCIAL

## 2020-10-05 PROCEDURE — 97140 MANUAL THERAPY 1/> REGIONS: CPT

## 2020-10-05 PROCEDURE — 97035 APP MDLTY 1+ULTRASOUND EA 15: CPT

## 2020-10-05 PROCEDURE — 97110 THERAPEUTIC EXERCISES: CPT

## 2020-10-05 NOTE — PROGRESS NOTES
Dx: Posterior tibial tendon dysfunction (PTTD) of right lower extremity (W40.861)            Insurance (Authorized # of Visits):  8 + 6      Authorizing Physician: Dr. Lucie Valencia  Next MD visit: none scheduled  Fall Risk: standard         Precautions: n/a Plantar fascia STM x6'  R Posterior tibial tendon and musculotendinous intersection STM x3'  (B) talocrural distraction grade III x4'  (B) talocrural posterior glide grade IV with DF mobilization x4' L Plantar fascia IASTM x6'  R Posterior tibial tendon an posterior tibial tendon, 3MHz, 1.2 w/cm sq   Standing calf stretch 3x30\" (B) with verbal cues for WBing across lateral side of foot Standing calf stretch 3x30\" (B) with verbal cues for WBing across lateral side of foot Standing calf stretch 3x30\" (B) wi proprioception Taping R ankle to improve proprioception --       HEP: Issued at eval  1/13/2020  -Standing calf stretch  -Seated arch lifts  -Resisted inversion with RTB. Access Code: [email or web address]   URL: [email or web address]   Date: 09/02/2020   Pr

## 2020-10-19 ENCOUNTER — HOSPITAL ENCOUNTER (EMERGENCY)
Facility: HOSPITAL | Age: 45
Discharge: HOME | End: 2020-10-19
Attending: EMERGENCY MEDICINE
Payer: COMMERCIAL

## 2020-10-19 VITALS
OXYGEN SATURATION: 100 % | HEART RATE: 78 BPM | HEIGHT: 62 IN | WEIGHT: 235 LBS | DIASTOLIC BLOOD PRESSURE: 82 MMHG | BODY MASS INDEX: 43.24 KG/M2 | TEMPERATURE: 98 F | SYSTOLIC BLOOD PRESSURE: 117 MMHG | RESPIRATION RATE: 12 BRPM

## 2020-10-19 DIAGNOSIS — R51.9 ACUTE NONINTRACTABLE HEADACHE, UNSPECIFIED HEADACHE TYPE: Primary | ICD-10-CM

## 2020-10-19 PROCEDURE — 96374 THER/PROPH/DIAG INJ IV PUSH: CPT

## 2020-10-19 PROCEDURE — 96375 TX/PRO/DX INJ NEW DRUG ADDON: CPT

## 2020-10-19 PROCEDURE — 96361 HYDRATE IV INFUSION ADD-ON: CPT

## 2020-10-19 PROCEDURE — 99284 EMERGENCY DEPT VISIT MOD MDM: CPT

## 2020-10-19 RX ORDER — DIPHENHYDRAMINE HYDROCHLORIDE 50 MG/ML
25 INJECTION INTRAMUSCULAR; INTRAVENOUS ONCE
Status: COMPLETED | OUTPATIENT
Start: 2020-10-19 | End: 2020-10-19

## 2020-10-19 RX ORDER — METOCLOPRAMIDE HYDROCHLORIDE 5 MG/ML
10 INJECTION INTRAMUSCULAR; INTRAVENOUS ONCE
Status: COMPLETED | OUTPATIENT
Start: 2020-10-19 | End: 2020-10-19

## 2020-10-19 RX ORDER — ONDANSETRON 2 MG/ML
4 INJECTION INTRAMUSCULAR; INTRAVENOUS ONCE
Status: COMPLETED | OUTPATIENT
Start: 2020-10-19 | End: 2020-10-19

## 2020-10-19 RX ORDER — ACETAMINOPHEN 500 MG
1000 TABLET ORAL ONCE
Status: COMPLETED | OUTPATIENT
Start: 2020-10-19 | End: 2020-10-19

## 2020-10-19 RX ORDER — ONDANSETRON 4 MG/1
4 TABLET, ORALLY DISINTEGRATING ORAL EVERY 4 HOURS PRN
Qty: 10 TABLET | Refills: 0 | Status: SHIPPED | OUTPATIENT
Start: 2020-10-19 | End: 2020-10-26

## 2020-10-19 NOTE — ED NOTES
Pt rpts main reason for coming to ED is the nausea she is experiencing. Says that her headache felt better when she took tylenol, but concerned with nausea.

## 2020-10-19 NOTE — ED PROVIDER NOTES
Patient Seen in: [facility] Emergency Department      History   Patient presents with:  Headache    Stated Complaint: headache    HPI    53-year-old with past medical history of migraines presents today for a headache.   Patient reports previously be above.    Physical Exam     ED Triage Vitals [10/19/20 0743]   BP (!) 146/102   Pulse 94   Resp 12   Temp 98.4 °F (36.9 °C)   Temp src Temporal   SpO2 97 %   O2 Device None (Room air)       Current:/79   Pulse 71   Temp 98.4 °F (36.9 °C) (Temporal) improvement, patient is comfortable with and requesting discharge at this time. Patient is not displaying any sedative effects from the migraine cocktail at this time.   Will DC        Disposition and Plan     Clinical Impression:  Acute nonintractable hea

## 2020-10-22 DIAGNOSIS — Z13.220 ENCOUNTER FOR LIPID SCREENING FOR CARDIOVASCULAR DISEASE: Primary | ICD-10-CM

## 2020-10-22 DIAGNOSIS — Z13.6 ENCOUNTER FOR LIPID SCREENING FOR CARDIOVASCULAR DISEASE: Primary | ICD-10-CM

## 2020-10-23 ENCOUNTER — OFFICE VISIT (OUTPATIENT)
Dept: FAMILY MEDICINE CLINIC | Facility: CLINIC | Age: 45
End: 2020-10-23

## 2020-10-23 ENCOUNTER — LAB ENCOUNTER (OUTPATIENT)
Dept: LAB | Age: 45
End: 2020-10-23
Attending: FAMILY MEDICINE
Payer: COMMERCIAL

## 2020-10-23 VITALS
SYSTOLIC BLOOD PRESSURE: 110 MMHG | TEMPERATURE: 97 F | HEIGHT: 62 IN | BODY MASS INDEX: 43.06 KG/M2 | HEART RATE: 64 BPM | RESPIRATION RATE: 14 BRPM | DIASTOLIC BLOOD PRESSURE: 70 MMHG | WEIGHT: 234 LBS

## 2020-10-23 DIAGNOSIS — Z13.6 ENCOUNTER FOR LIPID SCREENING FOR CARDIOVASCULAR DISEASE: ICD-10-CM

## 2020-10-23 DIAGNOSIS — R82.90 ABNORMAL URINALYSIS: ICD-10-CM

## 2020-10-23 DIAGNOSIS — Z13.220 ENCOUNTER FOR LIPID SCREENING FOR CARDIOVASCULAR DISEASE: ICD-10-CM

## 2020-10-23 DIAGNOSIS — E66.01 MORBID OBESITY WITH BMI OF 40.0-44.9, ADULT (HCC): ICD-10-CM

## 2020-10-23 DIAGNOSIS — Z00.00 LABORATORY EXAMINATION ORDERED AS PART OF A ROUTINE GENERAL MEDICAL EXAMINATION: ICD-10-CM

## 2020-10-23 DIAGNOSIS — Z23 NEED FOR VACCINATION: ICD-10-CM

## 2020-10-23 DIAGNOSIS — R77.9 ELEVATED BLOOD PROTEIN: ICD-10-CM

## 2020-10-23 DIAGNOSIS — Z00.00 ROUTINE GENERAL MEDICAL EXAMINATION AT A HEALTH CARE FACILITY: Primary | ICD-10-CM

## 2020-10-23 DIAGNOSIS — Z12.4 SCREENING FOR CERVICAL CANCER: ICD-10-CM

## 2020-10-23 DIAGNOSIS — Z12.31 ENCOUNTER FOR SCREENING MAMMOGRAM FOR MALIGNANT NEOPLASM OF BREAST: ICD-10-CM

## 2020-10-23 PROCEDURE — 80061 LIPID PANEL: CPT

## 2020-10-23 PROCEDURE — 36415 COLL VENOUS BLD VENIPUNCTURE: CPT

## 2020-10-23 PROCEDURE — 84080 ASSAY ALKALINE PHOSPHATASES: CPT

## 2020-10-23 PROCEDURE — 84075 ASSAY ALKALINE PHOSPHATASE: CPT

## 2020-10-23 PROCEDURE — 87624 HPV HI-RISK TYP POOLED RSLT: CPT | Performed by: FAMILY MEDICINE

## 2020-10-23 PROCEDURE — 81003 URINALYSIS AUTO W/O SCOPE: CPT | Performed by: FAMILY MEDICINE

## 2020-10-23 PROCEDURE — 90686 IIV4 VACC NO PRSV 0.5 ML IM: CPT | Performed by: FAMILY MEDICINE

## 2020-10-23 PROCEDURE — 90471 IMMUNIZATION ADMIN: CPT | Performed by: FAMILY MEDICINE

## 2020-10-23 PROCEDURE — 84443 ASSAY THYROID STIM HORMONE: CPT

## 2020-10-23 PROCEDURE — 3008F BODY MASS INDEX DOCD: CPT | Performed by: FAMILY MEDICINE

## 2020-10-23 PROCEDURE — 85025 COMPLETE CBC W/AUTO DIFF WBC: CPT

## 2020-10-23 PROCEDURE — 99396 PREV VISIT EST AGE 40-64: CPT | Performed by: FAMILY MEDICINE

## 2020-10-23 PROCEDURE — 3074F SYST BP LT 130 MM HG: CPT | Performed by: FAMILY MEDICINE

## 2020-10-23 PROCEDURE — 88175 CYTOPATH C/V AUTO FLUID REDO: CPT | Performed by: FAMILY MEDICINE

## 2020-10-23 PROCEDURE — 80053 COMPREHEN METABOLIC PANEL: CPT

## 2020-10-23 PROCEDURE — 3078F DIAST BP <80 MM HG: CPT | Performed by: FAMILY MEDICINE

## 2020-10-23 NOTE — H&P
CC: Annual Physical Exam    HPI:   Ines Osler is a 39year old female who presents for a complete physical exam. Symptoms: periods are regular. Patient complains of nothing.            Wt Readings from Last 6 Encounters:  10/23/20 : 234 lb (106.1 kg)  1 Esterase Urine Negative Negative    WBC Urine None Seen <5 /HPF    RBC URINE 0-2 0 - 2 /HPF    Bacteria Urine None Seen None Seen    Squamous Epi.  Cells Large (A) Small /LPF    Renal Tubular Epithelial Cells None Seen Small /LPF    Transitional Cells None History:   Procedure Laterality Date   • FOOT/TOES SURGERY PROC UNLISTED        Family History   Problem Relation Age of Onset   • Heart Attack Maternal Grandmother    • Diabetes Maternal Grandmother    • Hypertension Mother    • Other (Aortic Steennosis) is normal,scant discharge,cervix is pink,no adnexal masses or tenderness, PAP was done   MUSCULOSKELETAL: back is not tender,FROM of the back  EXTREMITIES: no cyanosis, clubbing or edema  NEURO: Oriented times three,cranial nerves are intact,motor and sens

## 2020-10-26 ENCOUNTER — OFFICE VISIT (OUTPATIENT)
Dept: CARDIOLOGY | Age: 45
End: 2020-10-26

## 2020-10-26 VITALS
HEIGHT: 62 IN | SYSTOLIC BLOOD PRESSURE: 110 MMHG | HEART RATE: 80 BPM | DIASTOLIC BLOOD PRESSURE: 70 MMHG | WEIGHT: 245 LBS | BODY MASS INDEX: 45.08 KG/M2

## 2020-10-26 DIAGNOSIS — I47.19 ATRIAL TACHYCARDIA: ICD-10-CM

## 2020-10-26 DIAGNOSIS — R00.2 PALPITATIONS: Primary | ICD-10-CM

## 2020-10-26 PROCEDURE — 99212 OFFICE O/P EST SF 10 MIN: CPT | Performed by: INTERNAL MEDICINE

## 2020-10-26 RX ORDER — METOPROLOL SUCCINATE 25 MG/1
25 TABLET, EXTENDED RELEASE ORAL 2 TIMES DAILY
Qty: 90 TABLET | Refills: 3 | Status: SHIPPED | OUTPATIENT
Start: 2020-10-26 | End: 2020-10-27 | Stop reason: SDUPTHER

## 2020-10-26 RX ORDER — ERGOCALCIFEROL 1.25 MG/1
CAPSULE ORAL
COMMUNITY
Start: 2020-09-12

## 2020-10-26 ASSESSMENT — PATIENT HEALTH QUESTIONNAIRE - PHQ9
SUM OF ALL RESPONSES TO PHQ9 QUESTIONS 1 AND 2: 0
SUM OF ALL RESPONSES TO PHQ9 QUESTIONS 1 AND 2: 0
2. FEELING DOWN, DEPRESSED OR HOPELESS: NOT AT ALL
CLINICAL INTERPRETATION OF PHQ9 SCORE: NO FURTHER SCREENING NEEDED
1. LITTLE INTEREST OR PLEASURE IN DOING THINGS: NOT AT ALL
CLINICAL INTERPRETATION OF PHQ2 SCORE: NO FURTHER SCREENING NEEDED

## 2020-10-26 ASSESSMENT — ENCOUNTER SYMPTOMS
HEMOPTYSIS: 0
SUSPICIOUS LESIONS: 0
CONSTITUTIONAL NEGATIVE: 1
WEIGHT LOSS: 0
BRUISES/BLEEDS EASILY: 0
HEMATOCHEZIA: 0
WEIGHT GAIN: 0
COUGH: 0
ALLERGIC/IMMUNOLOGIC COMMENTS: NO NEW FOOD ALLERGIES
NEUROLOGICAL NEGATIVE: 1
RESPIRATORY NEGATIVE: 1
FEVER: 0
CHILLS: 0

## 2020-10-27 RX ORDER — METOPROLOL SUCCINATE 25 MG/1
25 TABLET, EXTENDED RELEASE ORAL DAILY
Qty: 90 TABLET | Refills: 3 | Status: SHIPPED | OUTPATIENT
Start: 2020-10-27

## 2020-10-28 ENCOUNTER — OFFICE VISIT (OUTPATIENT)
Dept: INTERNAL MEDICINE CLINIC | Facility: CLINIC | Age: 45
End: 2020-10-28

## 2020-10-28 VITALS — HEIGHT: 62 IN | WEIGHT: 234 LBS | BODY MASS INDEX: 43.06 KG/M2

## 2020-10-28 DIAGNOSIS — E66.01 CLASS 3 SEVERE OBESITY DUE TO EXCESS CALORIES WITH SERIOUS COMORBIDITY AND BODY MASS INDEX (BMI) OF 40.0 TO 44.9 IN ADULT (HCC): Primary | ICD-10-CM

## 2020-10-28 PROCEDURE — 97803 MED NUTRITION INDIV SUBSEQ: CPT | Performed by: DIETITIAN, REGISTERED

## 2020-10-28 PROCEDURE — 3008F BODY MASS INDEX DOCD: CPT | Performed by: DIETITIAN, REGISTERED

## 2020-10-28 NOTE — PROGRESS NOTES
[address] [place] [address] [facility]  [address] [place] [id number]  Dept: [phone]  Loc: [phone]    10/28/20      Bariatric Follow-up Nutrition Session    Jenelle Zhang is a 39year old female.      [place] Component Value Date     08/28/2020    A1C 5.4 08/28/2020       Lipid Panel:  Lab Results   Component Value Date    CHOLEST 183 10/23/2020    TRIG 47 10/23/2020    HDL 61 (H) 10/23/2020     (H) 10/23/2020    VLDL 9 10/23/2020    TCHDLRATIO  ounces/day    Diet history:       Breakfast      AM Snack       Lunch     PM Snack     Dinner   2oz smoked turkey sausage + 1 oz cheese + 1 egg + 1 tbsp butter + 1 tbsp MCT oil  1/4 cup nuts 1 cup cabbage + 4 oz rotisserie chicken  Keto cheese pizza requirements? Yes  Patient understand fluid requirements (amount and method of intake)? Yes  Patient understands post-operative diet? Will assess with quiz  Patient keeping consistent food records?  Not consistent  Patient ready for Liquid Protein Education

## 2020-10-29 ENCOUNTER — MED REC SCAN ONLY (OUTPATIENT)
Dept: FAMILY MEDICINE CLINIC | Facility: CLINIC | Age: 45
End: 2020-10-29

## 2020-11-27 ENCOUNTER — HOSPITAL ENCOUNTER (OUTPATIENT)
Dept: MAMMOGRAPHY | Age: 45
Discharge: HOME | End: 2020-11-27
Attending: FAMILY MEDICINE
Payer: COMMERCIAL

## 2020-11-27 DIAGNOSIS — Z12.31 ENCOUNTER FOR SCREENING MAMMOGRAM FOR MALIGNANT NEOPLASM OF BREAST: ICD-10-CM

## 2020-11-27 PROCEDURE — 77063 BREAST TOMOSYNTHESIS BI: CPT | Performed by: FAMILY MEDICINE

## 2020-11-27 PROCEDURE — 77067 SCR MAMMO BI INCL CAD: CPT | Performed by: FAMILY MEDICINE

## 2020-11-30 RX ORDER — CYCLOBENZAPRINE HCL 10 MG
10 TABLET ORAL NIGHTLY PRN
Qty: 10 TABLET | Refills: 0 | Status: SHIPPED | OUTPATIENT
Start: 2020-11-30 | End: 2020-12-10

## 2020-11-30 NOTE — TELEPHONE ENCOUNTER
See below. rx has not been filled since 2015 for muscle spasms. Do you need to see her for this? Saw you in October for px. Please advise thanks.

## 2020-11-30 NOTE — TELEPHONE ENCOUNTER
S/w pt.  sts when she \"overdoes it\" for more than 2 hrs-ie cleaning, running errands, he whole body aches \"all over\". sts this has been going on for months but more intense over the past month. Denies any fever or chills.   Uses tylenol with \"some re

## 2020-11-30 NOTE — TELEPHONE ENCOUNTER
Patient would like a refill on muscle relaxer   ( cyclobenzaprine).      [id number] - [address], [phone], [phone]

## 2020-12-31 ENCOUNTER — APPOINTMENT (OUTPATIENT)
Dept: ULTRASOUND IMAGING | Age: 45
End: 2020-12-31
Attending: PHYSICIAN ASSISTANT
Payer: COMMERCIAL

## 2020-12-31 ENCOUNTER — HOSPITAL ENCOUNTER (OUTPATIENT)
Age: 45
Discharge: HOME | End: 2020-12-31
Attending: EMERGENCY MEDICINE
Payer: COMMERCIAL

## 2020-12-31 VITALS
RESPIRATION RATE: 16 BRPM | OXYGEN SATURATION: 99 % | TEMPERATURE: 98 F | DIASTOLIC BLOOD PRESSURE: 93 MMHG | SYSTOLIC BLOOD PRESSURE: 158 MMHG | HEART RATE: 63 BPM

## 2020-12-31 DIAGNOSIS — D25.9 UTERINE LEIOMYOMA, UNSPECIFIED LOCATION: Primary | ICD-10-CM

## 2020-12-31 LAB
#MXD IC: 0.5 X10ˆ3/UL (ref 0.1–1)
HCT VFR BLD AUTO: 35.3 %
HGB BLD-MCNC: 10.7 G/DL
LYMPHOCYTES # BLD AUTO: 2 X10ˆ3/UL (ref 1–4)
LYMPHOCYTES NFR BLD AUTO: 27.7 %
MCH RBC QN AUTO: 25.4 PG (ref 26–34)
MCHC RBC AUTO-ENTMCNC: 30.3 G/DL (ref 31–37)
MCV RBC AUTO: 83.6 FL (ref 80–100)
MIXED CELL %: 6.9 %
NEUTROPHILS # BLD AUTO: 4.7 X10ˆ3/UL (ref 1.5–7.7)
NEUTROPHILS NFR BLD AUTO: 65.4 %
PLATELET # BLD AUTO: 334 X10ˆ3/UL (ref 150–450)
POCT BILIRUBIN URINE: NEGATIVE
POCT BLOOD URINE: NEGATIVE
POCT GLUCOSE URINE: NEGATIVE MG/DL
POCT KETONE URINE: NEGATIVE MG/DL
POCT LEUKOCYTE ESTERASE URINE: NEGATIVE
POCT NITRITE URINE: NEGATIVE
POCT PH URINE: 7 (ref 5–8)
POCT PROTEIN URINE: NEGATIVE MG/DL
POCT SPECIFIC GRAVITY URINE: 1.02
POCT URINE CLARITY: CLEAR
POCT URINE COLOR: YELLOW
POCT URINE PREGNANCY: NEGATIVE
POCT UROBILINOGEN URINE: 0.2 MG/DL
RBC # BLD AUTO: 4.22 X10ˆ6/UL
WBC # BLD AUTO: 7.2 X10ˆ3/UL (ref 4–11)

## 2020-12-31 PROCEDURE — 85025 COMPLETE CBC W/AUTO DIFF WBC: CPT | Performed by: PHYSICIAN ASSISTANT

## 2020-12-31 PROCEDURE — 99215 OFFICE O/P EST HI 40 MIN: CPT

## 2020-12-31 PROCEDURE — 36415 COLL VENOUS BLD VENIPUNCTURE: CPT

## 2020-12-31 PROCEDURE — 99214 OFFICE O/P EST MOD 30 MIN: CPT

## 2020-12-31 PROCEDURE — 81002 URINALYSIS NONAUTO W/O SCOPE: CPT | Performed by: PHYSICIAN ASSISTANT

## 2020-12-31 PROCEDURE — 76856 US EXAM PELVIC COMPLETE: CPT | Performed by: PHYSICIAN ASSISTANT

## 2020-12-31 PROCEDURE — 81025 URINE PREGNANCY TEST: CPT | Performed by: PHYSICIAN ASSISTANT

## 2020-12-31 PROCEDURE — 76830 TRANSVAGINAL US NON-OB: CPT | Performed by: PHYSICIAN ASSISTANT

## 2020-12-31 NOTE — ED PROVIDER NOTES
I reviewed that chart and discussed the case. I have examined the patient and noted left lower quadrant abdominal pain for the past 2 weeks with history of ovarian cyst.  Abdomen is benign. Will check ultrasound and CBC.   Urine pregnancy test are unremar

## 2020-12-31 NOTE — ED INITIAL ASSESSMENT (HPI)
For the past few weeks, c/o intermittent sharp to the LLQ abdomen. In the past week, pain is more continuous. Last BM was yesterday and was soft. Denies N/V, diarrhea/constipation. Denies fevers.

## 2020-12-31 NOTE — ED PROVIDER NOTES
Patient Seen in: [facility]      History   Patient presents with:  Abdomen/Flank Pain    Stated Complaint: low abdominal pain x 2 weeks    HPI    29-year-old female presents to the immediate care for evaluation of left lower quadrant pain °C)   Temp src Temporal   SpO2 99 %   O2 Device None (Room air)       Current:/81   Pulse 81   Temp 97.7 °F (36.5 °C) (Temporal)   Resp 16   LMP 12/08/2020   SpO2 99%         Physical Exam  Vitals signs and nursing note reviewed.    Constitutional: 25 mm left uterine fundus region, previously 22 x 30 x 23 mm. Another fibroid in the right frontal region measures 17 x 18 mm RIGHT OVARY:  2.35 cm x 1.41 cm x 2.42 cm   The right ovary appears normal in size, shape, and echogenicity.  No significant valentine week            Medications Prescribed:  Current Discharge Medication List

## 2020-12-31 NOTE — ED NOTES
Patient was given 32oz of water and then went to [address]. When the patient returned, she was given a warm blanket for comfort. Pt updated with plan of care and is resting comfortably.

## 2021-01-13 ENCOUNTER — HOSPITAL ENCOUNTER (OUTPATIENT)
Age: 46
Discharge: HOME | End: 2021-01-13
Payer: COMMERCIAL

## 2021-01-13 VITALS
OXYGEN SATURATION: 95 % | HEART RATE: 89 BPM | DIASTOLIC BLOOD PRESSURE: 83 MMHG | RESPIRATION RATE: 20 BRPM | TEMPERATURE: 98 F | WEIGHT: 233.69 LBS | SYSTOLIC BLOOD PRESSURE: 140 MMHG | BODY MASS INDEX: 43 KG/M2

## 2021-01-13 DIAGNOSIS — Z20.822 EXPOSURE TO COVID-19 VIRUS: Primary | ICD-10-CM

## 2021-01-13 LAB
AMB EXT COVID-19 RESULT: NOT DETECTED
SARS-COV-2 RNA RESP QL NAA+PROBE: NOT DETECTED

## 2021-01-13 PROCEDURE — 99212 OFFICE O/P EST SF 10 MIN: CPT | Performed by: NURSE PRACTITIONER

## 2021-01-14 NOTE — ED PROVIDER NOTES
Patient Seen in: [address]      History   Patient presents with:  Covid-19 Test    Stated Complaint: Exposure    HPI/Subjective:   HPI    53-year-old female presents for COVID-19 testing.   She states that 3 to 4 days ago she was exp BMI 42.74 kg/m²         Physical Exam  Vitals signs and nursing note reviewed. Constitutional:       General: She is not in acute distress. Appearance: She is not toxic-appearing. HENT:      Head: Normocephalic and atraumatic.       Nose: Nose nor needed          Medications Prescribed:  Current Discharge Medication List

## 2021-01-19 ENCOUNTER — APPOINTMENT (OUTPATIENT)
Dept: ULTRASOUND IMAGING | Facility: HOSPITAL | Age: 46
End: 2021-01-19
Attending: EMERGENCY MEDICINE
Payer: COMMERCIAL

## 2021-01-19 ENCOUNTER — HOSPITAL ENCOUNTER (EMERGENCY)
Facility: HOSPITAL | Age: 46
Discharge: HOME | End: 2021-01-19
Attending: EMERGENCY MEDICINE
Payer: COMMERCIAL

## 2021-01-19 VITALS
WEIGHT: 233.69 LBS | OXYGEN SATURATION: 99 % | HEART RATE: 99 BPM | TEMPERATURE: 99 F | BODY MASS INDEX: 42.46 KG/M2 | HEIGHT: 62.01 IN | RESPIRATION RATE: 20 BRPM | SYSTOLIC BLOOD PRESSURE: 122 MMHG | DIASTOLIC BLOOD PRESSURE: 55 MMHG

## 2021-01-19 DIAGNOSIS — R10.13 EPIGASTRIC PAIN: Primary | ICD-10-CM

## 2021-01-19 LAB
ALBUMIN SERPL-MCNC: 3.8 G/DL (ref 3.4–5)
ALBUMIN/GLOB SERPL: 1 {RATIO} (ref 1–2)
ALP LIVER SERPL-CCNC: 128 U/L
ALT SERPL-CCNC: 36 U/L
ANION GAP SERPL CALC-SCNC: 4 MMOL/L (ref 0–18)
AST SERPL-CCNC: 52 U/L (ref 15–37)
BASOPHILS # BLD AUTO: 0.03 X10(3) UL (ref 0–0.2)
BASOPHILS NFR BLD AUTO: 0.3 %
BILIRUB SERPL-MCNC: 0.6 MG/DL (ref 0.1–2)
BILIRUB UR QL STRIP.AUTO: NEGATIVE
BUN BLD-MCNC: 12 MG/DL (ref 7–18)
BUN/CREAT SERPL: 21.1 (ref 10–20)
CALCIUM BLD-MCNC: 9.2 MG/DL (ref 8.5–10.1)
CHLORIDE SERPL-SCNC: 109 MMOL/L (ref 98–112)
CLARITY UR REFRACT.AUTO: CLEAR
CO2 SERPL-SCNC: 24 MMOL/L (ref 21–32)
COLOR UR AUTO: YELLOW
CREAT BLD-MCNC: 0.57 MG/DL
DEPRECATED RDW RBC AUTO: 46.1 FL (ref 35.1–46.3)
EOSINOPHIL # BLD AUTO: 0.07 X10(3) UL (ref 0–0.7)
EOSINOPHIL NFR BLD AUTO: 0.6 %
ERYTHROCYTE [DISTWIDTH] IN BLOOD BY AUTOMATED COUNT: 15.7 % (ref 11–15)
GLOBULIN PLAS-MCNC: 3.9 G/DL (ref 2.8–4.4)
GLUCOSE BLD-MCNC: 86 MG/DL (ref 70–99)
GLUCOSE UR STRIP.AUTO-MCNC: NEGATIVE MG/DL
HCT VFR BLD AUTO: 36.5 %
HGB BLD-MCNC: 11.8 G/DL
IMM GRANULOCYTES # BLD AUTO: 0.04 X10(3) UL (ref 0–1)
IMM GRANULOCYTES NFR BLD: 0.4 %
LEUKOCYTE ESTERASE UR QL STRIP.AUTO: NEGATIVE
LIPASE SERPL-CCNC: 108 U/L (ref 73–393)
LYMPHOCYTES # BLD AUTO: 0.94 X10(3) UL (ref 1–4)
LYMPHOCYTES NFR BLD AUTO: 8.6 %
M PROTEIN MFR SERPL ELPH: 7.7 G/DL (ref 6.4–8.2)
MCH RBC QN AUTO: 26.3 PG (ref 26–34)
MCHC RBC AUTO-ENTMCNC: 32.3 G/DL (ref 31–37)
MCV RBC AUTO: 81.3 FL
MONOCYTES # BLD AUTO: 0.5 X10(3) UL (ref 0.1–1)
MONOCYTES NFR BLD AUTO: 4.6 %
NEUTROPHILS # BLD AUTO: 9.37 X10 (3) UL (ref 1.5–7.7)
NEUTROPHILS # BLD AUTO: 9.37 X10(3) UL (ref 1.5–7.7)
NEUTROPHILS NFR BLD AUTO: 85.5 %
NITRITE UR QL STRIP.AUTO: NEGATIVE
OSMOLALITY SERPL CALC.SUM OF ELEC: 283 MOSM/KG (ref 275–295)
PH UR STRIP.AUTO: 5 [PH] (ref 4.5–8)
PLATELET # BLD AUTO: 250 10(3)UL (ref 150–450)
POCT URINE PREGNANCY: NEGATIVE
POTASSIUM SERPL-SCNC: 3.6 MMOL/L (ref 3.5–5.1)
PROT UR STRIP.AUTO-MCNC: NEGATIVE MG/DL
RBC # BLD AUTO: 4.49 X10(6)UL
SODIUM SERPL-SCNC: 137 MMOL/L (ref 136–145)
SP GR UR STRIP.AUTO: 1.01 (ref 1–1.03)
UROBILINOGEN UR STRIP.AUTO-MCNC: <2 MG/DL
WBC # BLD AUTO: 11 X10(3) UL (ref 4–11)

## 2021-01-19 PROCEDURE — 81001 URINALYSIS AUTO W/SCOPE: CPT | Performed by: EMERGENCY MEDICINE

## 2021-01-19 PROCEDURE — 85025 COMPLETE CBC W/AUTO DIFF WBC: CPT | Performed by: EMERGENCY MEDICINE

## 2021-01-19 PROCEDURE — 83690 ASSAY OF LIPASE: CPT | Performed by: EMERGENCY MEDICINE

## 2021-01-19 PROCEDURE — 96361 HYDRATE IV INFUSION ADD-ON: CPT | Performed by: EMERGENCY MEDICINE

## 2021-01-19 PROCEDURE — 96374 THER/PROPH/DIAG INJ IV PUSH: CPT | Performed by: EMERGENCY MEDICINE

## 2021-01-19 PROCEDURE — 99284 EMERGENCY DEPT VISIT MOD MDM: CPT | Performed by: EMERGENCY MEDICINE

## 2021-01-19 PROCEDURE — 93010 ELECTROCARDIOGRAM REPORT: CPT | Performed by: EMERGENCY MEDICINE

## 2021-01-19 PROCEDURE — 96375 TX/PRO/DX INJ NEW DRUG ADDON: CPT | Performed by: EMERGENCY MEDICINE

## 2021-01-19 PROCEDURE — 80053 COMPREHEN METABOLIC PANEL: CPT | Performed by: EMERGENCY MEDICINE

## 2021-01-19 PROCEDURE — 81025 URINE PREGNANCY TEST: CPT | Performed by: EMERGENCY MEDICINE

## 2021-01-19 PROCEDURE — 93005 ELECTROCARDIOGRAM TRACING: CPT

## 2021-01-19 PROCEDURE — 76700 US EXAM ABDOM COMPLETE: CPT | Performed by: EMERGENCY MEDICINE

## 2021-01-19 RX ORDER — ONDANSETRON 2 MG/ML
4 INJECTION INTRAMUSCULAR; INTRAVENOUS ONCE
Status: COMPLETED | OUTPATIENT
Start: 2021-01-19 | End: 2021-01-19

## 2021-01-19 RX ORDER — KETOROLAC TROMETHAMINE 30 MG/ML
15 INJECTION, SOLUTION INTRAMUSCULAR; INTRAVENOUS ONCE
Status: COMPLETED | OUTPATIENT
Start: 2021-01-19 | End: 2021-01-19

## 2021-01-20 NOTE — ED NOTES
Pt back from [address], states pain is gone. Pt denies n/v/d.  Pt ambulated to the bathroom, slow, steady gait

## 2021-01-20 NOTE — ED PROVIDER NOTES
Patient Seen in: [facility] Emergency Department      History   Patient presents with:  Abdomen/Flank Pain    Stated Complaint: ABD PAIN    HPI/Subjective:   HPI    Patient 46-year-old female who states that 5 PM today she developed acute onset of e ED Triage Vitals [01/19/21 2787]   /80   Pulse 103   Resp 18   Temp 98.6 °F (37 °C)   Temp src Temporal   SpO2 100 %   O2 Device None (Room air)       Current:/56   Pulse 108   Temp 98.6 °F (37 °C) (Temporal)   Resp 20   Ht 157.5 cm (5' 2.0 Abnormality         Status                     ---------                               -----------         ------                     CBC W/ DIFFERENTIAL[[id number]]          Abnormal            Final result                 Please

## 2021-01-21 LAB
ATRIAL RATE: 92 BPM
P AXIS: 56 DEGREES
P-R INTERVAL: 152 MS
Q-T INTERVAL: 340 MS
QRS DURATION: 62 MS
QTC CALCULATION (BEZET): 420 MS
R AXIS: 18 DEGREES
T AXIS: 36 DEGREES
VENTRICULAR RATE: 92 BPM

## 2021-03-19 ENCOUNTER — TELEPHONE (OUTPATIENT)
Dept: FAMILY MEDICINE CLINIC | Facility: CLINIC | Age: 46
End: 2021-03-19

## 2021-03-19 DIAGNOSIS — E66.01 MORBID OBESITY WITH BMI OF 40.0-44.9, ADULT (HCC): Primary | ICD-10-CM

## 2021-03-19 NOTE — TELEPHONE ENCOUNTER
Referral to [facility], Weight Mangement (INTERNAL)    Reason for the order/referral:  WEIGHT MANAGEMENT/F/UP   PCP: Shriley Esteves   Refer to Provider:  Osito Rodriguez   Specialty:  WEIGHT MANAGMENT   Patient Insurance: Payor: [employer] HMO / Plan: Tamy Hui /

## 2021-04-16 RX ORDER — OMEPRAZOLE 40 MG/1
40 CAPSULE, DELAYED RELEASE ORAL DAILY
Qty: 90 CAPSULE | Refills: 0 | Status: SHIPPED | OUTPATIENT
Start: 2021-04-16 | End: 2021-07-12

## 2021-04-20 RX ORDER — OMEPRAZOLE 40 MG/1
CAPSULE, DELAYED RELEASE ORAL
Qty: 90 CAPSULE | Refills: 0 | OUTPATIENT
Start: 2021-04-20

## 2021-05-16 ENCOUNTER — HOSPITAL ENCOUNTER (EMERGENCY)
Facility: HOSPITAL | Age: 46
Discharge: HOME | End: 2021-05-17
Attending: EMERGENCY MEDICINE
Payer: COMMERCIAL

## 2021-05-16 DIAGNOSIS — G43.909 MIGRAINE WITHOUT STATUS MIGRAINOSUS, NOT INTRACTABLE, UNSPECIFIED MIGRAINE TYPE: Primary | ICD-10-CM

## 2021-05-16 PROCEDURE — 99284 EMERGENCY DEPT VISIT MOD MDM: CPT

## 2021-05-16 PROCEDURE — 96375 TX/PRO/DX INJ NEW DRUG ADDON: CPT

## 2021-05-16 PROCEDURE — 96374 THER/PROPH/DIAG INJ IV PUSH: CPT

## 2021-05-16 PROCEDURE — 96361 HYDRATE IV INFUSION ADD-ON: CPT

## 2021-05-16 RX ORDER — METOCLOPRAMIDE HYDROCHLORIDE 5 MG/ML
5 INJECTION INTRAMUSCULAR; INTRAVENOUS ONCE
Status: COMPLETED | OUTPATIENT
Start: 2021-05-16 | End: 2021-05-16

## 2021-05-16 RX ORDER — KETOROLAC TROMETHAMINE 30 MG/ML
15 INJECTION, SOLUTION INTRAMUSCULAR; INTRAVENOUS ONCE
Status: COMPLETED | OUTPATIENT
Start: 2021-05-16 | End: 2021-05-16

## 2021-05-16 RX ORDER — DIPHENHYDRAMINE HYDROCHLORIDE 50 MG/ML
25 INJECTION INTRAMUSCULAR; INTRAVENOUS ONCE
Status: COMPLETED | OUTPATIENT
Start: 2021-05-16 | End: 2021-05-16

## 2021-05-16 RX ORDER — DEXAMETHASONE SODIUM PHOSPHATE 4 MG/ML
10 VIAL (ML) INJECTION ONCE
Status: COMPLETED | OUTPATIENT
Start: 2021-05-16 | End: 2021-05-16

## 2021-05-17 ENCOUNTER — APPOINTMENT (OUTPATIENT)
Dept: CT IMAGING | Facility: HOSPITAL | Age: 46
End: 2021-05-17
Attending: EMERGENCY MEDICINE
Payer: COMMERCIAL

## 2021-05-17 VITALS
DIASTOLIC BLOOD PRESSURE: 90 MMHG | BODY MASS INDEX: 47.2 KG/M2 | HEART RATE: 68 BPM | RESPIRATION RATE: 16 BRPM | OXYGEN SATURATION: 98 % | WEIGHT: 250 LBS | TEMPERATURE: 97 F | HEIGHT: 61 IN | SYSTOLIC BLOOD PRESSURE: 151 MMHG

## 2021-05-17 PROCEDURE — 70450 CT HEAD/BRAIN W/O DYE: CPT | Performed by: EMERGENCY MEDICINE

## 2021-05-17 RX ORDER — HALOPERIDOL 5 MG/ML
5 INJECTION INTRAMUSCULAR ONCE
Status: COMPLETED | OUTPATIENT
Start: 2021-05-17 | End: 2021-05-17

## 2021-05-17 RX ORDER — CAFFEINE 200 MG
200 TABLET ORAL ONCE
Status: COMPLETED | OUTPATIENT
Start: 2021-05-17 | End: 2021-05-17

## 2021-05-17 RX ORDER — HYDROCODONE BITARTRATE AND ACETAMINOPHEN 5; 325 MG/1; MG/1
1 TABLET ORAL EVERY 6 HOURS PRN
Qty: 10 TABLET | Refills: 0 | Status: SHIPPED | OUTPATIENT
Start: 2021-05-17 | End: 2021-05-24

## 2021-05-17 RX ORDER — ONDANSETRON 4 MG/1
4 TABLET, ORALLY DISINTEGRATING ORAL EVERY 4 HOURS PRN
Qty: 10 TABLET | Refills: 0 | Status: SHIPPED | OUTPATIENT
Start: 2021-05-17 | End: 2021-05-24

## 2021-05-17 NOTE — ED INITIAL ASSESSMENT (HPI)
Pt c/o migraine since Friday. Pt has hx of migraines. Pt states taking normal medications at home with no relief.

## 2021-05-17 NOTE — ED PROVIDER NOTES
Patient Seen in: [facility] Emergency Department      History   Patient presents with:  Headache    Stated Complaint: headache    HPI/Subjective:   HPI    Patient is a 44-year-old female with a history of migraines who presents with a right frontal 16   Temp 97.1 °F (36.2 °C)   Temp src Temporal   SpO2 97 %   O2 Device None (Room air)       Current:/89   Pulse 70   Temp 97.1 °F (36.2 °C) (Temporal)   Resp 16   Ht 154.9 cm (5' 1\")   Wt 113.4 kg   LMP 05/07/2021   SpO2 98%   BMI 47.24 kg/m² gone but she would like to go home.                    Disposition and Plan     Clinical Impression:  Migraine without status migrainosus, not intractable, unspecified migraine type  (primary encounter diagnosis)     Disposition:  Discharge  5/17/2021  2:04

## 2021-05-21 ENCOUNTER — HOSPITAL ENCOUNTER (OUTPATIENT)
Dept: GENERAL RADIOLOGY | Age: 46
Discharge: HOME | End: 2021-05-21
Attending: FAMILY MEDICINE
Payer: COMMERCIAL

## 2021-05-21 ENCOUNTER — OFFICE VISIT (OUTPATIENT)
Dept: FAMILY MEDICINE CLINIC | Facility: CLINIC | Age: 46
End: 2021-05-21

## 2021-05-21 VITALS
SYSTOLIC BLOOD PRESSURE: 120 MMHG | RESPIRATION RATE: 16 BRPM | TEMPERATURE: 98 F | BODY MASS INDEX: 47.58 KG/M2 | HEIGHT: 61 IN | DIASTOLIC BLOOD PRESSURE: 86 MMHG | HEART RATE: 72 BPM | WEIGHT: 252 LBS

## 2021-05-21 DIAGNOSIS — R03.0 ELEVATED BP WITHOUT DIAGNOSIS OF HYPERTENSION: ICD-10-CM

## 2021-05-21 DIAGNOSIS — E66.01 MORBID OBESITY WITH BMI OF 40.0-44.9, ADULT (HCC): ICD-10-CM

## 2021-05-21 DIAGNOSIS — M25.562 ACUTE PAIN OF LEFT KNEE: ICD-10-CM

## 2021-05-21 DIAGNOSIS — G43.109 MIGRAINE WITH AURA AND WITHOUT STATUS MIGRAINOSUS, NOT INTRACTABLE: ICD-10-CM

## 2021-05-21 DIAGNOSIS — M25.562 ACUTE PAIN OF LEFT KNEE: Primary | ICD-10-CM

## 2021-05-21 PROCEDURE — 73560 X-RAY EXAM OF KNEE 1 OR 2: CPT | Performed by: FAMILY MEDICINE

## 2021-05-21 PROCEDURE — 3079F DIAST BP 80-89 MM HG: CPT | Performed by: FAMILY MEDICINE

## 2021-05-21 PROCEDURE — 3008F BODY MASS INDEX DOCD: CPT | Performed by: FAMILY MEDICINE

## 2021-05-21 PROCEDURE — 99214 OFFICE O/P EST MOD 30 MIN: CPT | Performed by: FAMILY MEDICINE

## 2021-05-21 PROCEDURE — 3074F SYST BP LT 130 MM HG: CPT | Performed by: FAMILY MEDICINE

## 2021-05-21 RX ORDER — HYDROCODONE BITARTRATE AND ACETAMINOPHEN 5; 325 MG/1; MG/1
1 TABLET ORAL 2 TIMES DAILY PRN
Qty: 30 TABLET | Refills: 0 | Status: SHIPPED | OUTPATIENT
Start: 2021-05-21 | End: 2022-01-17

## 2021-05-21 NOTE — PROGRESS NOTES
Sujata Mantilla is a 39year old female. HPI:   Pt. Complains of left knee pain for the past 1 month. Walking more but knee hurting. Pt. States the knee in the past week has been worse. Pain is 8/10. Diclofenac did not help. Norco helped.        HYDROc Never Smoker      Smokeless tobacco: Never Used    Vaping Use      Vaping Use: Never used    Alcohol use: No      Alcohol/week: 0.0 standard drinks    Drug use: No       Results for orders placed or performed during the hospital encounter of 01/19/21   COM pregnancy Negative     POCT LOT NUMBER [id number]     Procedure Control ok     EXPIRATION DATE 08/31/2022    EKG 12-LEAD   Result Value Ref Range    Ventricular rate 92 BPM    Atrial rate 92 BPM    P-R Interval 152 ms    QRS Duration 62 ms    Q-T Interval numbness    EXAM:   /86   Pulse 72   Temp 98 °F (36.7 °C) (Skin)   Resp 16   Ht 5' 1\" (1.549 m)   Wt 252 lb (114.3 kg)   LMP 05/07/2021   BMI 47.61 kg/m²   GENERAL: well developed, well nourished,in no apparent distress  SKIN: no rashes,no suspiciou

## 2021-06-17 ENCOUNTER — OFFICE VISIT (OUTPATIENT)
Dept: SURGERY | Facility: CLINIC | Age: 46
End: 2021-06-17

## 2021-06-17 VITALS
OXYGEN SATURATION: 99 % | WEIGHT: 242 LBS | HEART RATE: 82 BPM | DIASTOLIC BLOOD PRESSURE: 94 MMHG | BODY MASS INDEX: 45.69 KG/M2 | HEIGHT: 61.2 IN | SYSTOLIC BLOOD PRESSURE: 124 MMHG

## 2021-06-17 DIAGNOSIS — E88.81 INSULIN RESISTANCE: ICD-10-CM

## 2021-06-17 DIAGNOSIS — E66.01 MORBID OBESITY WITH BMI OF 45.0-49.9, ADULT (HCC): ICD-10-CM

## 2021-06-17 DIAGNOSIS — R63.2 BINGE EATING: ICD-10-CM

## 2021-06-17 DIAGNOSIS — R63.5 WEIGHT GAIN: Primary | ICD-10-CM

## 2021-06-17 PROCEDURE — 99204 OFFICE O/P NEW MOD 45 MIN: CPT | Performed by: INTERNAL MEDICINE

## 2021-06-17 PROCEDURE — 3074F SYST BP LT 130 MM HG: CPT | Performed by: INTERNAL MEDICINE

## 2021-06-17 PROCEDURE — 3008F BODY MASS INDEX DOCD: CPT | Performed by: INTERNAL MEDICINE

## 2021-06-17 PROCEDURE — 3080F DIAST BP >= 90 MM HG: CPT | Performed by: INTERNAL MEDICINE

## 2021-06-17 NOTE — PROGRESS NOTES
[facility] Consultation Note       Patient:  Rosalinda Wade  :      1975  MRN:      [id number]    Referring Provider: Dr. Alice Grossman       Chief Complaint:  Patient presents with:  Weight Management: Non-surgical weight management (NORCO) 5-325 MG Oral Tab Take 1 tablet by mouth 2 (two) times daily as needed for Pain. 30 tablet 0   • Omeprazole 40 MG Oral Capsule Delayed Release Take 1 capsule (40 mg total) by mouth daily.  90 capsule 0   • Ondansetron HCl (ZOFRAN) 4 mg tablet Take 1 Difficulty of Paying Living Expenses:   Food Insecurity:       Worried About [address] in the Last Year:       Ran Out of Food in the Last Year:   Transportation Needs:       Lack of Transportation (Medical):       Lack of Transportation (Non-Med for eating and manage cues and Eat slowly and take 20 to 30 minutes to complete each meal      ROS:  Constitutional: negative  Respiratory: positive for dyspnea on exertion  Cardiovascular: positive for palpitations  Gastrointestinal: negative  Musculoskel fatigued    Would like to try conservative management before having surgery    Diagnoses and all orders for this visit:    Weight gain    Morbid obesity with BMI of 45.0-49.9, adult ([address]    Insulin resistance        Bouchra Chen MD

## 2021-07-12 ENCOUNTER — OFFICE VISIT (OUTPATIENT)
Dept: FAMILY MEDICINE CLINIC | Facility: CLINIC | Age: 46
End: 2021-07-12

## 2021-07-12 VITALS
DIASTOLIC BLOOD PRESSURE: 80 MMHG | RESPIRATION RATE: 16 BRPM | WEIGHT: 242 LBS | HEART RATE: 66 BPM | BODY MASS INDEX: 45.11 KG/M2 | HEIGHT: 61.25 IN | SYSTOLIC BLOOD PRESSURE: 130 MMHG

## 2021-07-12 DIAGNOSIS — I49.3 PVC (PREMATURE VENTRICULAR CONTRACTION): ICD-10-CM

## 2021-07-12 DIAGNOSIS — Z79.899 MEDICATION MANAGEMENT: ICD-10-CM

## 2021-07-12 DIAGNOSIS — E55.9 VITAMIN D DEFICIENCY: ICD-10-CM

## 2021-07-12 DIAGNOSIS — E66.01 MORBID OBESITY WITH BMI OF 40.0-44.9, ADULT (HCC): ICD-10-CM

## 2021-07-12 DIAGNOSIS — Z13.89 SCREENING FOR GENITOURINARY CONDITION: ICD-10-CM

## 2021-07-12 DIAGNOSIS — R03.0 ELEVATED BP WITHOUT DIAGNOSIS OF HYPERTENSION: Primary | ICD-10-CM

## 2021-07-12 DIAGNOSIS — Z71.85 VACCINE COUNSELING: ICD-10-CM

## 2021-07-12 DIAGNOSIS — R20.2 TINGLING: ICD-10-CM

## 2021-07-12 DIAGNOSIS — Z87.898 HISTORY OF PREDIABETES: ICD-10-CM

## 2021-07-12 DIAGNOSIS — Z11.1 PPD SCREENING TEST: ICD-10-CM

## 2021-07-12 DIAGNOSIS — M79.2 NEURALGIA: ICD-10-CM

## 2021-07-12 DIAGNOSIS — Z82.49 FAMILY HISTORY OF PULMONARY EMBOLISM: ICD-10-CM

## 2021-07-12 DIAGNOSIS — D50.8 IRON DEFICIENCY ANEMIA SECONDARY TO INADEQUATE DIETARY IRON INTAKE: ICD-10-CM

## 2021-07-12 DIAGNOSIS — I49.1 PAC (PREMATURE ATRIAL CONTRACTION): ICD-10-CM

## 2021-07-12 DIAGNOSIS — Z00.00 LABORATORY EXAMINATION ORDERED AS PART OF A ROUTINE GENERAL MEDICAL EXAMINATION: ICD-10-CM

## 2021-07-12 DIAGNOSIS — G43.109 MIGRAINE WITH AURA AND WITHOUT STATUS MIGRAINOSUS, NOT INTRACTABLE: ICD-10-CM

## 2021-07-12 DIAGNOSIS — Z91.09 ENVIRONMENTAL ALLERGIES: ICD-10-CM

## 2021-07-12 DIAGNOSIS — I48.0 PAF (PAROXYSMAL ATRIAL FIBRILLATION) (HCC): ICD-10-CM

## 2021-07-12 PROCEDURE — 90471 IMMUNIZATION ADMIN: CPT | Performed by: FAMILY MEDICINE

## 2021-07-12 PROCEDURE — 3075F SYST BP GE 130 - 139MM HG: CPT | Performed by: FAMILY MEDICINE

## 2021-07-12 PROCEDURE — 90715 TDAP VACCINE 7 YRS/> IM: CPT | Performed by: FAMILY MEDICINE

## 2021-07-12 PROCEDURE — 3079F DIAST BP 80-89 MM HG: CPT | Performed by: FAMILY MEDICINE

## 2021-07-12 PROCEDURE — 86580 TB INTRADERMAL TEST: CPT | Performed by: FAMILY MEDICINE

## 2021-07-12 PROCEDURE — 3008F BODY MASS INDEX DOCD: CPT | Performed by: FAMILY MEDICINE

## 2021-07-12 PROCEDURE — 99214 OFFICE O/P EST MOD 30 MIN: CPT | Performed by: FAMILY MEDICINE

## 2021-07-12 RX ORDER — ACETAMINOPHEN 500 MG
1000 TABLET ORAL EVERY 6 HOURS PRN
COMMUNITY

## 2021-07-12 RX ORDER — TOPIRAMATE 50 MG/1
50 TABLET, FILM COATED ORAL NIGHTLY
COMMUNITY

## 2021-07-12 RX ORDER — OMEPRAZOLE 40 MG/1
40 CAPSULE, DELAYED RELEASE ORAL DAILY
Qty: 90 CAPSULE | Refills: 1 | Status: SHIPPED | OUTPATIENT
Start: 2021-07-12 | End: 2021-12-28

## 2021-07-12 NOTE — PROGRESS NOTES
Dalia Malik is a 42year old female. HPI:   Pt. Is here for follow up on her BP and left knee pain.   Allergies -- on allegra prn  PAF/PAC/PVC -- stable  Hx of prediabetes -- stable  Morbid obesity -- seeing weight loss clinic  GERD -- on omeprazole  PT Personal history of urinary calculi    • Routine gynecological examination    • Screening for lipoid disorders    • Screening for other and unspecified endocrine, nutritional, metabolic, and immunity disorders    • Screening for other and unspecified endoc Protein Urine Negative Negative mg/dl    Urobilinogen Urine <2.0 0.2 - 2.0 mg/dL    Nitrite Urine Negative Negative    Leukocyte Esterase Urine Negative Negative    WBC Urine 1-4 <5 /HPF    RBC Urine 3-5 (A) 0 - 2 /HPF    Bacteria Urine None Seen None Seen 0.00 - 0.20 x10(3) uL    Immature Granulocyte Absolute 0.04 0.00 - 1.00 x10(3) uL    Neutrophil % 85.5 %    Lymphocyte % 8.6 %    Monocyte % 4.6 %    Eosinophil % 0.6 %    Basophil % 0.3 %    Immature Granulocyte % 0.4 %       REVIEW OF SYSTEMS:   GENERAL: TSH W Reflex To Free T4      Lipid Panel      Vitamin D      Hemoglobin A1C      Urinalysis with Culture Reflex      TdaP (Adacel, Boostrix) (08365) (DX V06.1/Z23)      Pneumococcal 23, Adult (Pneumovax) (06075) (Dx V03.82/Z23)      Meds & Refills for t

## 2021-07-14 ENCOUNTER — NURSE ONLY (OUTPATIENT)
Dept: FAMILY MEDICINE CLINIC | Facility: CLINIC | Age: 46
End: 2021-07-14

## 2021-07-30 ENCOUNTER — TELEPHONE (OUTPATIENT)
Dept: ADMINISTRATIVE | Age: 46
End: 2021-07-30

## 2021-07-30 DIAGNOSIS — M79.671 FOOT PAIN, RIGHT: Primary | ICD-10-CM

## 2021-07-30 DIAGNOSIS — M25.571 ACUTE RIGHT ANKLE PAIN: ICD-10-CM

## 2021-07-30 NOTE — TELEPHONE ENCOUNTER
PT LOOKING FOR REFERRAL TO ORTHOPEDIC   PT WANTS TO ADDRESS R FOOD AND ANKLE PAIN   PT WANTS REFERRAL FOR PROVIDER CLOSE TO HER HOME

## 2021-08-02 NOTE — TELEPHONE ENCOUNTER
She said she'll come in but it has to be 0700. She said it's been going on for 2 years and she's done 2 rounds of PT with no help and saw a podiatrist () and I see visit was for calcaneal spur R.foot,chronic pain R.ankle and flat feet. She said he didn't help her either. Wants to know if she can see someone in the [facility]. Pain for 2 years now.

## 2021-08-03 ENCOUNTER — OFFICE VISIT (OUTPATIENT)
Dept: FAMILY MEDICINE CLINIC | Facility: CLINIC | Age: 46
End: 2021-08-03

## 2021-08-03 VITALS
SYSTOLIC BLOOD PRESSURE: 120 MMHG | BODY MASS INDEX: 45.85 KG/M2 | HEIGHT: 61.25 IN | HEART RATE: 84 BPM | DIASTOLIC BLOOD PRESSURE: 84 MMHG | WEIGHT: 246 LBS | RESPIRATION RATE: 20 BRPM

## 2021-08-03 DIAGNOSIS — G89.29 CHRONIC PAIN OF RIGHT ANKLE: ICD-10-CM

## 2021-08-03 DIAGNOSIS — M25.571 CHRONIC PAIN OF RIGHT ANKLE: ICD-10-CM

## 2021-08-03 DIAGNOSIS — M77.31 CALCANEAL SPUR OF RIGHT FOOT: Primary | ICD-10-CM

## 2021-08-03 DIAGNOSIS — N92.6 IRREGULAR PERIODS: ICD-10-CM

## 2021-08-03 DIAGNOSIS — M79.671 RIGHT FOOT PAIN: ICD-10-CM

## 2021-08-03 PROCEDURE — 3008F BODY MASS INDEX DOCD: CPT | Performed by: FAMILY MEDICINE

## 2021-08-03 PROCEDURE — 3074F SYST BP LT 130 MM HG: CPT | Performed by: FAMILY MEDICINE

## 2021-08-03 PROCEDURE — 99213 OFFICE O/P EST LOW 20 MIN: CPT | Performed by: FAMILY MEDICINE

## 2021-08-03 PROCEDURE — 3079F DIAST BP 80-89 MM HG: CPT | Performed by: FAMILY MEDICINE

## 2021-08-03 NOTE — PROGRESS NOTES
Elenora Denver is a 42year old female. HPI:   Patient complains of chronic right foot and ankle pain. She would like to see a different podiatrist and she has seen in the past.  She has been in pain for 2 years.  Saw Dr. Ag Wilburn in the past.   Pain is 5/ • Screening for other and unspecified endocrine, nutritional, metabolic, and immunity disorders    • Screening for other and unspecified endocrine, nutritional, metabolic, and immunity disorders    • Screening for other and unspecified genitourinary con pain.  Imaging deferred at this time. Will monitor period at this time. CPM present meds. The patient indicates understanding of these issues and agrees to the plan. No follow-ups on file.

## 2021-09-13 ENCOUNTER — HOSPITAL ENCOUNTER (OUTPATIENT)
Age: 46
Discharge: HOME | End: 2021-09-13
Payer: COMMERCIAL

## 2021-09-13 VITALS
WEIGHT: 230 LBS | BODY MASS INDEX: 43 KG/M2 | TEMPERATURE: 97 F | DIASTOLIC BLOOD PRESSURE: 86 MMHG | HEART RATE: 107 BPM | SYSTOLIC BLOOD PRESSURE: 159 MMHG | OXYGEN SATURATION: 99 % | RESPIRATION RATE: 18 BRPM

## 2021-09-13 DIAGNOSIS — Z20.822 LAB TEST NEGATIVE FOR COVID-19 VIRUS: ICD-10-CM

## 2021-09-13 DIAGNOSIS — J01.40 ACUTE NON-RECURRENT PANSINUSITIS: Primary | ICD-10-CM

## 2021-09-13 LAB — SARS-COV-2 RNA RESP QL NAA+PROBE: NOT DETECTED

## 2021-09-13 PROCEDURE — 99213 OFFICE O/P EST LOW 20 MIN: CPT

## 2021-09-13 RX ORDER — AMOXICILLIN AND CLAVULANATE POTASSIUM 875; 125 MG/1; MG/1
1 TABLET, FILM COATED ORAL 2 TIMES DAILY
Qty: 20 TABLET | Refills: 0 | Status: SHIPPED | OUTPATIENT
Start: 2021-09-13 | End: 2021-09-23

## 2021-09-13 RX ORDER — FLUTICASONE PROPIONATE 50 MCG
2 SPRAY, SUSPENSION (ML) NASAL DAILY
Qty: 16 G | Refills: 0 | Status: SHIPPED | OUTPATIENT
Start: 2021-09-13 | End: 2021-10-13

## 2021-09-13 NOTE — ED PROVIDER NOTES
Patient Seen in: [facility]      History   Patient presents with:  Sinus Problem    Stated Complaint: Sinus problems with cough 2 days    Subjective:   HPI    62-year-old female who comes in today complaining of sinus congestion postnasal 09/13/21 0813 97.3 °F (36.3 °C)   Temp src 09/13/21 0813 Temporal   SpO2 09/13/21 0813 99 %   O2 Device 09/13/21 0813 None (Room air)       Current:/86   Pulse 107   Temp 97.3 °F (36.3 °C) (Temporal)   Resp 18   Wt 104.3 kg   LMP 09/04/2021 (Exact Da with the patient, who expresses understanding.  All questions and concerns are addressed to the patient's satisfaction prior to discharge today.           Disposition and Plan     Clinical Impression:  Acute non-recurrent pansinusitis  (primary encounter di

## 2021-09-13 NOTE — ED INITIAL ASSESSMENT (HPI)
Patient presents to IC with c/o sinus congestion and PND x 2 days. +Seasonal allergies. Fully vaccinated. No known covid exposures.

## 2021-10-24 NOTE — H&P
CC: Annual Physical Exam    HPI:   Hussein Abdullahi is a 42year old female who presents for a complete physical exam. Symptoms: periods are regular. Patient complains of longer periods since August and had the pfizer vaccine.    August was normal and came 2 [facility])    • Palpitations    • Personal history of urinary calculi    • Routine gynecological examination    • Screening for lipoid disorders    • Screening for other and unspecified endocrine, nutritional, metabolic, and immunity disorders    • Screening fo Ht 5' 2\" (1.575 m)   Wt 245 lb (111.1 kg)   LMP 10/18/2021   BMI 44.81 kg/m²   Body mass index is 44.81 kg/m².    GENERAL: well developed, well nourished,in no apparent distress  SKIN: no rashes,no suspicious lesions  HEENT: atraumatic, normocephalic,ears cycle    Orders Placed This Encounter      HIV AG AB Combo [E]      LH (Luteinizing Hormone)      [facility]      Estradiol      Meds & Refills for this Visit:  Requested Prescriptions      No prescriptions requested or ordered in this encounter       Imaging & C

## 2021-10-25 ENCOUNTER — OFFICE VISIT (OUTPATIENT)
Dept: FAMILY MEDICINE CLINIC | Facility: CLINIC | Age: 46
End: 2021-10-25

## 2021-10-25 VITALS
SYSTOLIC BLOOD PRESSURE: 148 MMHG | HEART RATE: 64 BPM | TEMPERATURE: 98 F | HEIGHT: 62 IN | WEIGHT: 245 LBS | BODY MASS INDEX: 45.08 KG/M2 | RESPIRATION RATE: 16 BRPM | DIASTOLIC BLOOD PRESSURE: 92 MMHG

## 2021-10-25 DIAGNOSIS — Z23 NEED FOR VACCINATION: ICD-10-CM

## 2021-10-25 DIAGNOSIS — Z00.00 ROUTINE GENERAL MEDICAL EXAMINATION AT A HEALTH CARE FACILITY: Primary | ICD-10-CM

## 2021-10-25 DIAGNOSIS — G56.01 RIGHT CARPAL TUNNEL SYNDROME: ICD-10-CM

## 2021-10-25 DIAGNOSIS — Z12.4 SCREENING FOR CERVICAL CANCER: ICD-10-CM

## 2021-10-25 DIAGNOSIS — Z12.31 ENCOUNTER FOR SCREENING MAMMOGRAM FOR MALIGNANT NEOPLASM OF BREAST: ICD-10-CM

## 2021-10-25 DIAGNOSIS — Z11.3 SCREENING FOR STD (SEXUALLY TRANSMITTED DISEASE): ICD-10-CM

## 2021-10-25 DIAGNOSIS — N93.9 ABNORMAL BLEEDING IN MENSTRUAL CYCLE: ICD-10-CM

## 2021-10-25 PROCEDURE — 90686 IIV4 VACC NO PRSV 0.5 ML IM: CPT | Performed by: FAMILY MEDICINE

## 2021-10-25 PROCEDURE — 87591 N.GONORRHOEAE DNA AMP PROB: CPT | Performed by: FAMILY MEDICINE

## 2021-10-25 PROCEDURE — 88175 CYTOPATH C/V AUTO FLUID REDO: CPT | Performed by: FAMILY MEDICINE

## 2021-10-25 PROCEDURE — 87491 CHLMYD TRACH DNA AMP PROBE: CPT | Performed by: FAMILY MEDICINE

## 2021-10-25 PROCEDURE — 3080F DIAST BP >= 90 MM HG: CPT | Performed by: FAMILY MEDICINE

## 2021-10-25 PROCEDURE — 87624 HPV HI-RISK TYP POOLED RSLT: CPT | Performed by: FAMILY MEDICINE

## 2021-10-25 PROCEDURE — 3008F BODY MASS INDEX DOCD: CPT | Performed by: FAMILY MEDICINE

## 2021-10-25 PROCEDURE — 3077F SYST BP >= 140 MM HG: CPT | Performed by: FAMILY MEDICINE

## 2021-10-25 PROCEDURE — 90471 IMMUNIZATION ADMIN: CPT | Performed by: FAMILY MEDICINE

## 2021-10-25 PROCEDURE — 99396 PREV VISIT EST AGE 40-64: CPT | Performed by: FAMILY MEDICINE

## 2021-10-27 ENCOUNTER — APPOINTMENT (OUTPATIENT)
Dept: CARDIOLOGY | Age: 46
End: 2021-10-27

## 2021-10-29 ENCOUNTER — LAB ENCOUNTER (OUTPATIENT)
Dept: LAB | Age: 46
End: 2021-10-29
Attending: FAMILY MEDICINE
Payer: COMMERCIAL

## 2021-10-29 DIAGNOSIS — R77.1 ELEVATED SERUM GLOBULIN LEVEL: ICD-10-CM

## 2021-10-29 DIAGNOSIS — E55.9 VITAMIN D DEFICIENCY: ICD-10-CM

## 2021-10-29 DIAGNOSIS — N93.9 ABNORMAL BLEEDING IN MENSTRUAL CYCLE: ICD-10-CM

## 2021-10-29 DIAGNOSIS — Z13.89 SCREENING FOR GENITOURINARY CONDITION: ICD-10-CM

## 2021-10-29 DIAGNOSIS — Z00.00 LABORATORY EXAMINATION ORDERED AS PART OF A ROUTINE GENERAL MEDICAL EXAMINATION: ICD-10-CM

## 2021-10-29 DIAGNOSIS — Z11.3 SCREENING FOR STD (SEXUALLY TRANSMITTED DISEASE): ICD-10-CM

## 2021-10-29 DIAGNOSIS — Z87.898 HISTORY OF PREDIABETES: ICD-10-CM

## 2021-10-29 DIAGNOSIS — R74.8 ELEVATED ALKALINE PHOSPHATASE LEVEL: ICD-10-CM

## 2021-10-29 PROCEDURE — 84443 ASSAY THYROID STIM HORMONE: CPT

## 2021-10-29 PROCEDURE — 84075 ASSAY ALKALINE PHOSPHATASE: CPT

## 2021-10-29 PROCEDURE — 80053 COMPREHEN METABOLIC PANEL: CPT

## 2021-10-29 PROCEDURE — 84080 ASSAY ALKALINE PHOSPHATASES: CPT

## 2021-10-29 PROCEDURE — 82306 VITAMIN D 25 HYDROXY: CPT

## 2021-10-29 PROCEDURE — 83002 ASSAY OF GONADOTROPIN (LH): CPT

## 2021-10-29 PROCEDURE — 82784 ASSAY IGA/IGD/IGG/IGM EACH: CPT

## 2021-10-29 PROCEDURE — 82670 ASSAY OF TOTAL ESTRADIOL: CPT

## 2021-10-29 PROCEDURE — 83001 ASSAY OF GONADOTROPIN (FSH): CPT

## 2021-10-29 PROCEDURE — 83036 HEMOGLOBIN GLYCOSYLATED A1C: CPT

## 2021-10-29 PROCEDURE — 80061 LIPID PANEL: CPT

## 2021-10-29 PROCEDURE — 87389 HIV-1 AG W/HIV-1&-2 AB AG IA: CPT

## 2021-10-29 PROCEDURE — 85025 COMPLETE CBC W/AUTO DIFF WBC: CPT

## 2021-10-29 PROCEDURE — 81003 URINALYSIS AUTO W/O SCOPE: CPT

## 2021-11-20 ENCOUNTER — PATIENT MESSAGE (OUTPATIENT)
Dept: FAMILY MEDICINE CLINIC | Facility: CLINIC | Age: 46
End: 2021-11-20

## 2021-12-03 ENCOUNTER — HOSPITAL ENCOUNTER (OUTPATIENT)
Dept: MAMMOGRAPHY | Age: 46
Discharge: HOME | End: 2021-12-03
Attending: FAMILY MEDICINE
Payer: COMMERCIAL

## 2021-12-03 DIAGNOSIS — Z12.31 ENCOUNTER FOR SCREENING MAMMOGRAM FOR MALIGNANT NEOPLASM OF BREAST: ICD-10-CM

## 2021-12-03 PROCEDURE — 77063 BREAST TOMOSYNTHESIS BI: CPT | Performed by: FAMILY MEDICINE

## 2021-12-03 PROCEDURE — 77067 SCR MAMMO BI INCL CAD: CPT | Performed by: FAMILY MEDICINE

## 2021-12-20 ENCOUNTER — HOSPITAL ENCOUNTER (OUTPATIENT)
Age: 46
Discharge: HOME | End: 2021-12-20
Payer: COMMERCIAL

## 2021-12-20 VITALS
OXYGEN SATURATION: 100 % | RESPIRATION RATE: 18 BRPM | HEART RATE: 108 BPM | SYSTOLIC BLOOD PRESSURE: 136 MMHG | HEIGHT: 62 IN | DIASTOLIC BLOOD PRESSURE: 90 MMHG | TEMPERATURE: 98 F | BODY MASS INDEX: 42.33 KG/M2 | WEIGHT: 230 LBS

## 2021-12-20 DIAGNOSIS — Z20.822 EXPOSURE TO COVID-19 VIRUS: ICD-10-CM

## 2021-12-20 DIAGNOSIS — Z20.822 LAB TEST NEGATIVE FOR COVID-19 VIRUS: Primary | ICD-10-CM

## 2021-12-20 PROCEDURE — U0002 COVID-19 LAB TEST NON-CDC: HCPCS | Performed by: PHYSICIAN ASSISTANT

## 2021-12-20 PROCEDURE — 99212 OFFICE O/P EST SF 10 MIN: CPT | Performed by: PHYSICIAN ASSISTANT

## 2021-12-20 NOTE — ED PROVIDER NOTES
Patient Seen in: [facility] [address] [place] [address]      History   Patient presents with:  Testing    Stated Complaint: [id number] testing    Subjective:   HPI    53-year-old female presents for Covid testing after exposure 2 days ago. Currently asymptomatic. Appearance: She is well-developed. HENT:      Head: Atraumatic.       Right Ear: External ear normal.      Left Ear: External ear normal.   Eyes:      Conjunctiva/sclera: Conjunctivae normal.   Cardiovascular:      Rate and Rhythm: Normal rate and regular

## 2021-12-28 ENCOUNTER — OFFICE VISIT (OUTPATIENT)
Dept: ORTHOPEDICS CLINIC | Facility: CLINIC | Age: 46
End: 2021-12-28

## 2021-12-28 ENCOUNTER — OFFICE VISIT (OUTPATIENT)
Dept: FAMILY MEDICINE CLINIC | Facility: CLINIC | Age: 46
End: 2021-12-28

## 2021-12-28 VITALS
BODY MASS INDEX: 46.38 KG/M2 | SYSTOLIC BLOOD PRESSURE: 130 MMHG | WEIGHT: 252 LBS | HEART RATE: 98 BPM | RESPIRATION RATE: 18 BRPM | DIASTOLIC BLOOD PRESSURE: 90 MMHG | HEIGHT: 62 IN

## 2021-12-28 VITALS — WEIGHT: 230 LBS | BODY MASS INDEX: 42.33 KG/M2 | HEIGHT: 62 IN

## 2021-12-28 DIAGNOSIS — R03.0 ELEVATED BP WITHOUT DIAGNOSIS OF HYPERTENSION: ICD-10-CM

## 2021-12-28 DIAGNOSIS — R52 BODY ACHES: ICD-10-CM

## 2021-12-28 DIAGNOSIS — G43.109 MIGRAINE WITH AURA AND WITHOUT STATUS MIGRAINOSUS, NOT INTRACTABLE: ICD-10-CM

## 2021-12-28 DIAGNOSIS — R51.9 TEMPORAL HEADACHE: ICD-10-CM

## 2021-12-28 DIAGNOSIS — K21.9 GASTROESOPHAGEAL REFLUX DISEASE, UNSPECIFIED WHETHER ESOPHAGITIS PRESENT: ICD-10-CM

## 2021-12-28 DIAGNOSIS — G56.03 BILATERAL CARPAL TUNNEL SYNDROME: Primary | ICD-10-CM

## 2021-12-28 DIAGNOSIS — R51.9 TEMPORAL PAIN: Primary | ICD-10-CM

## 2021-12-28 PROCEDURE — 3075F SYST BP GE 130 - 139MM HG: CPT | Performed by: FAMILY MEDICINE

## 2021-12-28 PROCEDURE — 85652 RBC SED RATE AUTOMATED: CPT | Performed by: FAMILY MEDICINE

## 2021-12-28 PROCEDURE — 86140 C-REACTIVE PROTEIN: CPT | Performed by: FAMILY MEDICINE

## 2021-12-28 PROCEDURE — 3080F DIAST BP >= 90 MM HG: CPT | Performed by: FAMILY MEDICINE

## 2021-12-28 PROCEDURE — 3008F BODY MASS INDEX DOCD: CPT | Performed by: FAMILY MEDICINE

## 2021-12-28 PROCEDURE — 99203 OFFICE O/P NEW LOW 30 MIN: CPT | Performed by: ORTHOPAEDIC SURGERY

## 2021-12-28 PROCEDURE — 99214 OFFICE O/P EST MOD 30 MIN: CPT | Performed by: FAMILY MEDICINE

## 2021-12-28 PROCEDURE — 3008F BODY MASS INDEX DOCD: CPT | Performed by: ORTHOPAEDIC SURGERY

## 2021-12-28 RX ORDER — OMEPRAZOLE 40 MG/1
40 CAPSULE, DELAYED RELEASE ORAL DAILY
Qty: 90 CAPSULE | Refills: 1 | Status: SHIPPED | OUTPATIENT
Start: 2021-12-28

## 2021-12-28 NOTE — PROGRESS NOTES
Terrance Ledesma is a 42year old female. HPI:   Pt. Is here for follow up on her migraines -- last one was a week ago and it stayed in her temples -- stabbing pain. Last episode was Dec. 18 and it lasted until 12/19.   Woke up at 5 am and took tlyenol wi 0    No current facility-administered medications for this visit. No Known Allergies   Past Medical History:   Diagnosis Date   • Acute pharyngitis    • Esophageal reflux    • Ganglion of tendon sheath    • Migraines    • Morbid obesity with BMI of 45. auscultation  CARDIO: RRR without murmur  GI: soft, good BS's  EXTREMITIES: no cyanosis, clubbing or edema    ASSESSMENT AND PLAN:   Body aches  Temporal headache  Migraine with aura and without status migrainosus, not intractable  Temporal pain  (primary

## 2021-12-28 NOTE — H&P
Clinic Note [facility]     Assessment/Plan:  42year old female    1. Bilateral carpal tunnel syndrome–we will obtain EMG/NCV. I do think that the right side based on my clinical exam is fairly consistent with carpal tunnel syndrome.   The left side nighttime with numbness and tingling. Sometimes the pain does make it difficult to fall asleep. She has been wearing a wrist brace at nighttime and throughout the daytime which does help her symptoms.     Past Medical History:   Diagnosis Date   • Acute p Diabetes Maternal Grandmother    • Hypertension Mother    • Other (Aortic Steennosis) Mother    • Obesity Other         fam hx     Social History    Occupational History      Not on file    Tobacco Use      Smoking status: Never Smoker      Smokeless tobac

## 2022-01-17 ENCOUNTER — HOSPITAL ENCOUNTER (EMERGENCY)
Facility: HOSPITAL | Age: 47
Discharge: HOME | End: 2022-01-17
Attending: EMERGENCY MEDICINE
Payer: COMMERCIAL

## 2022-01-17 ENCOUNTER — APPOINTMENT (OUTPATIENT)
Dept: GENERAL RADIOLOGY | Facility: HOSPITAL | Age: 47
End: 2022-01-17
Attending: EMERGENCY MEDICINE
Payer: COMMERCIAL

## 2022-01-17 VITALS
HEART RATE: 63 BPM | DIASTOLIC BLOOD PRESSURE: 79 MMHG | SYSTOLIC BLOOD PRESSURE: 127 MMHG | WEIGHT: 230 LBS | TEMPERATURE: 99 F | RESPIRATION RATE: 18 BRPM | HEIGHT: 61 IN | OXYGEN SATURATION: 100 % | BODY MASS INDEX: 43.43 KG/M2

## 2022-01-17 DIAGNOSIS — R07.9 CHEST PAIN OF UNCERTAIN ETIOLOGY: Primary | ICD-10-CM

## 2022-01-17 LAB
ALBUMIN SERPL-MCNC: 3.3 G/DL (ref 3.4–5)
ALBUMIN/GLOB SERPL: 0.8 {RATIO} (ref 1–2)
ALP LIVER SERPL-CCNC: 115 U/L
ALT SERPL-CCNC: 18 U/L
ANION GAP SERPL CALC-SCNC: 5 MMOL/L (ref 0–18)
AST SERPL-CCNC: 11 U/L (ref 15–37)
ATRIAL RATE: 73 BPM
BASOPHILS # BLD AUTO: 0.04 X10(3) UL (ref 0–0.2)
BASOPHILS NFR BLD AUTO: 0.6 %
BILIRUB SERPL-MCNC: 0.4 MG/DL (ref 0.1–2)
BUN BLD-MCNC: 13 MG/DL (ref 7–18)
CALCIUM BLD-MCNC: 8.9 MG/DL (ref 8.5–10.1)
CHLORIDE SERPL-SCNC: 108 MMOL/L (ref 98–112)
CO2 SERPL-SCNC: 28 MMOL/L (ref 21–32)
CREAT BLD-MCNC: 0.62 MG/DL
EOSINOPHIL # BLD AUTO: 0.19 X10(3) UL (ref 0–0.7)
EOSINOPHIL NFR BLD AUTO: 2.8 %
ERYTHROCYTE [DISTWIDTH] IN BLOOD BY AUTOMATED COUNT: 15.7 %
GLOBULIN PLAS-MCNC: 3.9 G/DL (ref 2.8–4.4)
GLUCOSE BLD-MCNC: 96 MG/DL (ref 70–99)
HCT VFR BLD AUTO: 35.5 %
HGB BLD-MCNC: 11 G/DL
IMM GRANULOCYTES # BLD AUTO: 0.01 X10(3) UL (ref 0–1)
IMM GRANULOCYTES NFR BLD: 0.1 %
LYMPHOCYTES # BLD AUTO: 2.44 X10(3) UL (ref 1–4)
LYMPHOCYTES NFR BLD AUTO: 36.4 %
MCH RBC QN AUTO: 25 PG (ref 26–34)
MCHC RBC AUTO-ENTMCNC: 31 G/DL (ref 31–37)
MCV RBC AUTO: 80.7 FL
MONOCYTES # BLD AUTO: 0.5 X10(3) UL (ref 0.1–1)
MONOCYTES NFR BLD AUTO: 7.5 %
NEUTROPHILS # BLD AUTO: 3.53 X10 (3) UL (ref 1.5–7.7)
NEUTROPHILS # BLD AUTO: 3.53 X10(3) UL (ref 1.5–7.7)
NEUTROPHILS NFR BLD AUTO: 52.6 %
OSMOLALITY SERPL CALC.SUM OF ELEC: 292 MOSM/KG (ref 275–295)
P AXIS: 32 DEGREES
P-R INTERVAL: 176 MS
PLATELET # BLD AUTO: 275 10(3)UL (ref 150–450)
POTASSIUM SERPL-SCNC: 3.6 MMOL/L (ref 3.5–5.1)
PROT SERPL-MCNC: 7.2 G/DL (ref 6.4–8.2)
Q-T INTERVAL: 386 MS
QRS DURATION: 62 MS
QTC CALCULATION (BEZET): 425 MS
R AXIS: 0 DEGREES
RBC # BLD AUTO: 4.4 X10(6)UL
SARS-COV-2 RNA RESP QL NAA+PROBE: NOT DETECTED
SODIUM SERPL-SCNC: 141 MMOL/L (ref 136–145)
T AXIS: 19 DEGREES
TROPONIN I HIGH SENSITIVITY: 4 NG/L
TROPONIN I HIGH SENSITIVITY: 4 NG/L
VENTRICULAR RATE: 73 BPM
WBC # BLD AUTO: 6.7 X10(3) UL (ref 4–11)

## 2022-01-17 PROCEDURE — 80053 COMPREHEN METABOLIC PANEL: CPT | Performed by: EMERGENCY MEDICINE

## 2022-01-17 PROCEDURE — 93005 ELECTROCARDIOGRAM TRACING: CPT

## 2022-01-17 PROCEDURE — 99284 EMERGENCY DEPT VISIT MOD MDM: CPT

## 2022-01-17 PROCEDURE — 71045 X-RAY EXAM CHEST 1 VIEW: CPT | Performed by: EMERGENCY MEDICINE

## 2022-01-17 PROCEDURE — 93010 ELECTROCARDIOGRAM REPORT: CPT

## 2022-01-17 PROCEDURE — 84484 ASSAY OF TROPONIN QUANT: CPT | Performed by: EMERGENCY MEDICINE

## 2022-01-17 PROCEDURE — 36415 COLL VENOUS BLD VENIPUNCTURE: CPT

## 2022-01-17 PROCEDURE — 85025 COMPLETE CBC W/AUTO DIFF WBC: CPT | Performed by: EMERGENCY MEDICINE

## 2022-01-17 RX ORDER — METOPROLOL SUCCINATE 25 MG/1
25 TABLET, EXTENDED RELEASE ORAL DAILY
COMMUNITY

## 2022-01-17 RX ORDER — IBUPROFEN 200 MG
200 TABLET ORAL EVERY 6 HOURS PRN
COMMUNITY

## 2022-01-17 NOTE — ED PROVIDER NOTES
Patient Seen in: [facility] Emergency Department      History   Patient presents with:  Chest Pain Angina    Stated Complaint: CP    Subjective:   HPI    Patient is a 51-year-old female presenting to the emergency department because at around 5 AM w Use      Vaping Use: Never used    Alcohol use: Yes      Alcohol/week: 0.0 standard drinks      Comment: socially    Drug use: No             Review of Systems    Positive for stated complaint: CP  Other systems are as noted in HPI.   Constitutional and vit following components:       Result Value    AST 11 (*)     Alkaline Phosphatase 115 (*)     Albumin 3.3 (*)     A/G Ratio 0.8 (*)     All other components within normal limits   CBC W/ DIFFERENTIAL - Abnormal; Notable for the following components:    HGB 1 states     one episode of midsternal chest pain this morning. FINDINGS:  Heart size is within normal limits. Pleural spaces appear     clear. Mediastinal and hilar contours are normal.  No focal     consolidation.

## 2022-01-17 NOTE — ED INITIAL ASSESSMENT (HPI)
Patient in c/o CP that began at 0500 states she was already awake was getting out of bed when it began. Mild SOB with it. States 'I have hx of arrhythmia but I dont know what\"  Ambulatory in room with ease.

## 2022-02-02 ENCOUNTER — PATIENT MESSAGE (OUTPATIENT)
Dept: FAMILY MEDICINE CLINIC | Facility: CLINIC | Age: 47
End: 2022-02-02

## 2022-02-02 NOTE — TELEPHONE ENCOUNTER
Yes, temporal pain. But her story sounds like a sinus infection and I would recommend the urgent care to R/O COVID and possibly treat a sinus infection.

## 2022-02-02 NOTE — TELEPHONE ENCOUNTER
after she had the sed-rate and other inflamm. tests done you wanted her to see Catia Lockhart because vascular said was general surgery does temporal artery bx. See referral, I don't know Dx other than temporal pain? ?

## 2022-02-02 NOTE — TELEPHONE ENCOUNTER
From: Beto Smith  To: Jenny Santizo DO  Sent: 2/2/2022 2:28 PM CST  Subject: [facility]    Hi Doctor Prasad Brown,  The temple and headaches started back up a couple of days ago, followed by the ear pressure and toothaches. Right now (today), it is more prevalent on the right side. I know Ben Iyer called after my last visit and stated that there was someone you wanted me to see, but the pain had subsided at the time. Please advise on the next steps.      Bouchra Porter

## 2022-02-03 ENCOUNTER — HOSPITAL ENCOUNTER (OUTPATIENT)
Age: 47
Discharge: HOME | End: 2022-02-03
Payer: COMMERCIAL

## 2022-02-03 ENCOUNTER — APPOINTMENT (OUTPATIENT)
Dept: CT IMAGING | Age: 47
End: 2022-02-03
Attending: NURSE PRACTITIONER
Payer: COMMERCIAL

## 2022-02-03 VITALS
BODY MASS INDEX: 44.16 KG/M2 | RESPIRATION RATE: 16 BRPM | TEMPERATURE: 97 F | SYSTOLIC BLOOD PRESSURE: 120 MMHG | WEIGHT: 240 LBS | HEART RATE: 80 BPM | OXYGEN SATURATION: 98 % | DIASTOLIC BLOOD PRESSURE: 95 MMHG | HEIGHT: 62 IN

## 2022-02-03 DIAGNOSIS — R51.9 NONINTRACTABLE HEADACHE, UNSPECIFIED CHRONICITY PATTERN, UNSPECIFIED HEADACHE TYPE: Primary | ICD-10-CM

## 2022-02-03 LAB
B-HCG UR QL: NEGATIVE
SARS-COV-2 RNA RESP QL NAA+PROBE: NOT DETECTED

## 2022-02-03 PROCEDURE — 70450 CT HEAD/BRAIN W/O DYE: CPT | Performed by: NURSE PRACTITIONER

## 2022-02-03 PROCEDURE — 81025 URINE PREGNANCY TEST: CPT

## 2022-02-03 PROCEDURE — 99214 OFFICE O/P EST MOD 30 MIN: CPT

## 2022-02-03 NOTE — ED INITIAL ASSESSMENT (HPI)
Pt aox4. Pt c/o bilateral temple pain that radiates to rt ear, and dental pain that is intermittent since Dec. Pt was seen by Dr. Kingsley Scruggs Dec 28. Pt states temple pain went away x 4 weeks. Pt denies  Fever.  Pt denies nausea

## 2022-02-08 ENCOUNTER — PATIENT MESSAGE (OUTPATIENT)
Dept: FAMILY MEDICINE CLINIC | Facility: CLINIC | Age: 47
End: 2022-02-08

## 2022-02-16 ENCOUNTER — HOSPITAL ENCOUNTER (OUTPATIENT)
Dept: ULTRASOUND IMAGING | Age: 47
Discharge: HOME | End: 2022-02-16
Attending: FAMILY MEDICINE
Payer: COMMERCIAL

## 2022-02-16 DIAGNOSIS — N93.9 ABNORMAL BLEEDING IN MENSTRUAL CYCLE: ICD-10-CM

## 2022-02-16 PROCEDURE — 76856 US EXAM PELVIC COMPLETE: CPT | Performed by: FAMILY MEDICINE

## 2022-02-16 PROCEDURE — 76830 TRANSVAGINAL US NON-OB: CPT | Performed by: FAMILY MEDICINE

## 2022-03-10 ENCOUNTER — OFFICE VISIT (OUTPATIENT)
Dept: OBGYN CLINIC | Facility: CLINIC | Age: 47
End: 2022-03-10
Payer: COMMERCIAL

## 2022-03-10 VITALS
DIASTOLIC BLOOD PRESSURE: 66 MMHG | WEIGHT: 252 LBS | HEART RATE: 76 BPM | SYSTOLIC BLOOD PRESSURE: 118 MMHG | HEIGHT: 62 IN | BODY MASS INDEX: 46.38 KG/M2

## 2022-03-10 DIAGNOSIS — Z71.85 HPV VACCINE COUNSELING: ICD-10-CM

## 2022-03-10 DIAGNOSIS — N95.1 PERIMENOPAUSE: ICD-10-CM

## 2022-03-10 DIAGNOSIS — E66.01 MORBID OBESITY WITH BMI OF 45.0-49.9, ADULT (HCC): ICD-10-CM

## 2022-03-10 DIAGNOSIS — N93.9 ABNORMAL UTERINE BLEEDING (AUB): Primary | ICD-10-CM

## 2022-03-10 LAB
CONTROL LINE PRESENT WITH A CLEAR BACKGROUND (YES/NO): YES YES/NO
PREGNANCY TEST, URINE: NEGATIVE

## 2022-03-10 PROCEDURE — 3074F SYST BP LT 130 MM HG: CPT | Performed by: OBSTETRICS & GYNECOLOGY

## 2022-03-10 PROCEDURE — 3008F BODY MASS INDEX DOCD: CPT | Performed by: OBSTETRICS & GYNECOLOGY

## 2022-03-10 PROCEDURE — 3078F DIAST BP <80 MM HG: CPT | Performed by: OBSTETRICS & GYNECOLOGY

## 2022-03-10 PROCEDURE — 99203 OFFICE O/P NEW LOW 30 MIN: CPT | Performed by: OBSTETRICS & GYNECOLOGY

## 2022-03-10 PROCEDURE — 81025 URINE PREGNANCY TEST: CPT | Performed by: OBSTETRICS & GYNECOLOGY

## 2022-03-19 ENCOUNTER — HOSPITAL ENCOUNTER (OUTPATIENT)
Dept: CT IMAGING | Age: 47
Discharge: HOME | End: 2022-03-19
Attending: OTOLARYNGOLOGY
Payer: COMMERCIAL

## 2022-03-19 DIAGNOSIS — J32.4 CHRONIC PANSINUSITIS: ICD-10-CM

## 2022-03-19 PROCEDURE — 70486 CT MAXILLOFACIAL W/O DYE: CPT | Performed by: OTOLARYNGOLOGY

## 2022-05-18 ENCOUNTER — OFFICE VISIT (OUTPATIENT)
Dept: ELECTROPHYSIOLOGY | Facility: HOSPITAL | Age: 47
End: 2022-05-18
Attending: ORTHOPAEDIC SURGERY
Payer: COMMERCIAL

## 2022-05-18 DIAGNOSIS — R20.2 PARESTHESIA OF BOTH HANDS: Primary | ICD-10-CM

## 2022-05-18 DIAGNOSIS — G56.03 BILATERAL CARPAL TUNNEL SYNDROME: ICD-10-CM

## 2022-05-18 PROCEDURE — 95910 NRV CNDJ TEST 7-8 STUDIES: CPT | Performed by: OTHER

## 2022-05-18 PROCEDURE — 95886 MUSC TEST DONE W/N TEST COMP: CPT | Performed by: OTHER

## 2022-05-18 NOTE — PROCEDURES
[facility], [address] [place] [address]      PATIENT'S NAME: Brian Hastings   REFERRING PHYSICIAN: Saleem Meneses. Mica Correa MD   PATIENT ACCOUNT #: [de-identified] LOCATION: [facility]   [facility]   MEDICAL RECORD #: [id number] YOB: 1975   DATE OF EXAM: 05/18/2022       ELECTRONEUROMYOGRAPHIC REPORT    CHIEF COMPLAINT:  This patient presented with tingling and numbness sensation in both hands and the right arm for more than several years, more so on the right than the left. Patient also has shoulder pain. EMG of both hands was requested to rule out carpal tunnel syndrome. INTERPRETATION:  Bilateral median and ulnar sensory and motor responses as well as F-wave latency were normal.      Needle examination of right deltoid, biceps, triceps, and flexor carpi radialis muscles was normal.  Needle examination of bilateral cervical C5-6 and C6-7 paraspinal muscles was also normal.     IMPRESSION:  This is a normal study. There is no electrophysiological evidence of entrapment neuropathy in the wrist or elbow demonstrated at this time. There is no clearcut evidence of bilateral cervical radiculopathy either. However, mild nerve root irritation in any of those locations cannot be entirely ruled out. Clinical correlation is indicated.     Dictated By Mario Evans M.D.  d:   05/18/2022 12:22:03  t:   05/18/2022 12:52:01  [facility]  [id number]  /

## 2022-06-19 ENCOUNTER — HOSPITAL ENCOUNTER (OUTPATIENT)
Age: 47
Discharge: HOME | End: 2022-06-19
Attending: EMERGENCY MEDICINE
Payer: COMMERCIAL

## 2022-06-19 VITALS
HEART RATE: 61 BPM | OXYGEN SATURATION: 100 % | BODY MASS INDEX: 46.01 KG/M2 | SYSTOLIC BLOOD PRESSURE: 147 MMHG | RESPIRATION RATE: 18 BRPM | DIASTOLIC BLOOD PRESSURE: 92 MMHG | TEMPERATURE: 98 F | HEIGHT: 62 IN | WEIGHT: 250 LBS

## 2022-06-19 DIAGNOSIS — I10 HYPERTENSION, UNSPECIFIED TYPE: Primary | ICD-10-CM

## 2022-06-19 DIAGNOSIS — Z86.79 H/O ATRIAL TACHYCARDIA: ICD-10-CM

## 2022-06-19 DIAGNOSIS — G43.009 MIGRAINE WITHOUT AURA AND WITHOUT STATUS MIGRAINOSUS, NOT INTRACTABLE: ICD-10-CM

## 2022-06-19 DIAGNOSIS — R00.2 PALPITATIONS: ICD-10-CM

## 2022-06-19 PROCEDURE — 96372 THER/PROPH/DIAG INJ SC/IM: CPT

## 2022-06-19 PROCEDURE — 99214 OFFICE O/P EST MOD 30 MIN: CPT

## 2022-06-19 RX ORDER — KETOROLAC TROMETHAMINE 30 MG/ML
60 INJECTION, SOLUTION INTRAMUSCULAR; INTRAVENOUS ONCE
Status: COMPLETED | OUTPATIENT
Start: 2022-06-19 | End: 2022-06-19

## 2022-06-19 RX ORDER — METOCLOPRAMIDE 10 MG/1
10 TABLET ORAL 3 TIMES DAILY PRN
Qty: 20 TABLET | Refills: 0 | Status: SHIPPED | OUTPATIENT
Start: 2022-06-19

## 2022-06-19 RX ORDER — NAPROXEN 500 MG/1
500 TABLET ORAL 2 TIMES DAILY PRN
Qty: 20 TABLET | Refills: 0 | Status: SHIPPED | OUTPATIENT
Start: 2022-06-19

## 2022-06-19 RX ORDER — ACETAMINOPHEN 500 MG
1000 TABLET ORAL ONCE
Status: COMPLETED | OUTPATIENT
Start: 2022-06-19 | End: 2022-06-19

## 2022-07-19 ENCOUNTER — PATIENT MESSAGE (OUTPATIENT)
Dept: ORTHOPEDICS CLINIC | Facility: CLINIC | Age: 47
End: 2022-07-19

## 2022-07-19 ENCOUNTER — PATIENT MESSAGE (OUTPATIENT)
Dept: FAMILY MEDICINE CLINIC | Facility: CLINIC | Age: 47
End: 2022-07-19

## 2022-07-20 NOTE — TELEPHONE ENCOUNTER
From: [employer]  To: Rosa Elena Madrid MD  Sent: 7/19/2022 7:38 PM CDT  Subject: Never Study    Hello,  I had a nerve study done back in May for carpal tunnel syndrome, and I haven't heard anything regarding the test or moving forward about doing anything for the carpal tunnel.

## 2022-07-21 RX ORDER — OMEPRAZOLE 40 MG/1
40 CAPSULE, DELAYED RELEASE ORAL DAILY
Qty: 90 CAPSULE | Refills: 1 | Status: SHIPPED | OUTPATIENT
Start: 2022-07-21 | End: 2022-10-17

## 2022-07-21 NOTE — TELEPHONE ENCOUNTER
LOV- 10/25/21, NOV 7/25/22  Last refill-12/28/21  Qty-90    RF-1    Rx pended for approval  Thank you

## 2022-07-25 ENCOUNTER — OFFICE VISIT (OUTPATIENT)
Dept: FAMILY MEDICINE CLINIC | Facility: CLINIC | Age: 47
End: 2022-07-25
Payer: COMMERCIAL

## 2022-07-25 VITALS
DIASTOLIC BLOOD PRESSURE: 68 MMHG | BODY MASS INDEX: 48.58 KG/M2 | RESPIRATION RATE: 16 BRPM | TEMPERATURE: 98 F | HEIGHT: 62 IN | OXYGEN SATURATION: 100 % | SYSTOLIC BLOOD PRESSURE: 116 MMHG | WEIGHT: 264 LBS | HEART RATE: 64 BPM

## 2022-07-25 DIAGNOSIS — Z71.89 COUNSELING AND COORDINATION OF CARE: ICD-10-CM

## 2022-07-25 DIAGNOSIS — Z79.899 MEDICATION MANAGEMENT: ICD-10-CM

## 2022-07-25 DIAGNOSIS — E66.01 MORBID OBESITY (HCC): Primary | ICD-10-CM

## 2022-07-25 DIAGNOSIS — R03.0 ELEVATED BP WITHOUT DIAGNOSIS OF HYPERTENSION: ICD-10-CM

## 2022-07-25 PROCEDURE — 3078F DIAST BP <80 MM HG: CPT | Performed by: FAMILY MEDICINE

## 2022-07-25 PROCEDURE — 3074F SYST BP LT 130 MM HG: CPT | Performed by: FAMILY MEDICINE

## 2022-07-25 PROCEDURE — 99214 OFFICE O/P EST MOD 30 MIN: CPT | Performed by: FAMILY MEDICINE

## 2022-07-25 PROCEDURE — 3008F BODY MASS INDEX DOCD: CPT | Performed by: FAMILY MEDICINE

## 2022-08-01 ENCOUNTER — TELEPHONE (OUTPATIENT)
Dept: FAMILY MEDICINE CLINIC | Facility: CLINIC | Age: 47
End: 2022-08-01

## 2022-08-01 NOTE — TELEPHONE ENCOUNTER
I did not see aforementioned form. She states she already was able to get access to the requested form Patient verbalized understanding. No further questions or concerns at this time.

## 2022-08-01 NOTE — TELEPHONE ENCOUNTER
Marty Allen is calling to see if Dr Nick Royal still has a form for a physical that was filled out last year around July or Aug, for DCFS it is a job form, Please call Marty Allen at [phone]

## 2022-08-02 ENCOUNTER — OFFICE VISIT (OUTPATIENT)
Dept: FAMILY MEDICINE CLINIC | Facility: CLINIC | Age: 47
End: 2022-08-02
Payer: COMMERCIAL

## 2022-08-02 VITALS
BODY MASS INDEX: 48.76 KG/M2 | TEMPERATURE: 98 F | RESPIRATION RATE: 16 BRPM | DIASTOLIC BLOOD PRESSURE: 80 MMHG | HEIGHT: 62 IN | WEIGHT: 265 LBS | SYSTOLIC BLOOD PRESSURE: 132 MMHG | HEART RATE: 72 BPM | OXYGEN SATURATION: 100 %

## 2022-08-02 DIAGNOSIS — Z11.1 SCREENING-PULMONARY TB: Primary | ICD-10-CM

## 2022-08-02 PROCEDURE — 86580 TB INTRADERMAL TEST: CPT | Performed by: STUDENT IN AN ORGANIZED HEALTH CARE EDUCATION/TRAINING PROGRAM

## 2022-08-02 PROCEDURE — 3075F SYST BP GE 130 - 139MM HG: CPT | Performed by: STUDENT IN AN ORGANIZED HEALTH CARE EDUCATION/TRAINING PROGRAM

## 2022-08-02 PROCEDURE — 3079F DIAST BP 80-89 MM HG: CPT | Performed by: STUDENT IN AN ORGANIZED HEALTH CARE EDUCATION/TRAINING PROGRAM

## 2022-08-02 PROCEDURE — 99212 OFFICE O/P EST SF 10 MIN: CPT | Performed by: STUDENT IN AN ORGANIZED HEALTH CARE EDUCATION/TRAINING PROGRAM

## 2022-08-02 PROCEDURE — 3008F BODY MASS INDEX DOCD: CPT | Performed by: STUDENT IN AN ORGANIZED HEALTH CARE EDUCATION/TRAINING PROGRAM

## 2022-08-04 ENCOUNTER — NURSE ONLY (OUTPATIENT)
Dept: FAMILY MEDICINE CLINIC | Facility: CLINIC | Age: 47
End: 2022-08-04
Payer: COMMERCIAL

## 2022-08-04 LAB — INDURATION (): 0 MM (ref 0–11)

## 2022-08-10 ENCOUNTER — PATIENT MESSAGE (OUTPATIENT)
Dept: FAMILY MEDICINE CLINIC | Facility: CLINIC | Age: 47
End: 2022-08-10

## 2022-08-10 DIAGNOSIS — E66.01 MORBID OBESITY (HCC): Primary | ICD-10-CM

## 2022-08-10 DIAGNOSIS — G43.109 MIGRAINE WITH AURA AND WITHOUT STATUS MIGRAINOSUS, NOT INTRACTABLE: ICD-10-CM

## 2022-08-12 RX ORDER — TOPIRAMATE 50 MG/1
50 TABLET, FILM COATED ORAL NIGHTLY
Qty: 30 TABLET | Refills: 0 | Status: SHIPPED | OUTPATIENT
Start: 2022-08-12

## 2022-08-12 NOTE — TELEPHONE ENCOUNTER
From: Naresh Mobley  To: Kayla Najera DO  Sent: 8/10/2022 11:00 PM CDT  Subject: Medication Refill    Hi Dr. Yumi Carranza,    I hope all is well! I forgot to request a refill on my Topiramate during my last visit. I am down to my last two tablets, so I need a refill soon. Thanks! Venkata Lambert needs a printout of his immunizations. Is there a way he can get it from my chart, or does it need to be printed and picked up from the office? He needs it for school. He is leaving this coming Sunday, so he needs it by Saturday if it needs to be picked up. For some reason, he is still having a hard time trying to navigate my chart.      Thanks,  [employer]

## 2022-08-12 NOTE — TELEPHONE ENCOUNTER
Covering for Dr. Carrie Leong, topiramate medication is on apparently prescribed by a different provider than her PCP, will provide temporary 30-day refill of topiramate for now for this reason until she sees her PCP.      Sandy Snyder MD, 08/12/22, 8:51 AM

## 2022-09-05 ENCOUNTER — APPOINTMENT (OUTPATIENT)
Dept: GENERAL RADIOLOGY | Age: 47
End: 2022-09-05
Attending: PHYSICIAN ASSISTANT

## 2022-09-05 ENCOUNTER — HOSPITAL ENCOUNTER (OUTPATIENT)
Age: 47
Discharge: HOME | End: 2022-09-05

## 2022-09-05 VITALS
BODY MASS INDEX: 47.84 KG/M2 | WEIGHT: 260 LBS | SYSTOLIC BLOOD PRESSURE: 137 MMHG | TEMPERATURE: 98 F | DIASTOLIC BLOOD PRESSURE: 104 MMHG | HEART RATE: 78 BPM | OXYGEN SATURATION: 100 % | RESPIRATION RATE: 16 BRPM | HEIGHT: 62 IN

## 2022-09-05 DIAGNOSIS — S83.91XA SPRAIN OF RIGHT KNEE, UNSPECIFIED LIGAMENT, INITIAL ENCOUNTER: Primary | ICD-10-CM

## 2022-09-05 PROCEDURE — 99213 OFFICE O/P EST LOW 20 MIN: CPT

## 2022-09-05 PROCEDURE — 73560 X-RAY EXAM OF KNEE 1 OR 2: CPT | Performed by: PHYSICIAN ASSISTANT

## 2022-09-05 NOTE — ED INITIAL ASSESSMENT (HPI)
Patient reports she hurt her knee at work on Tuesday. Patient reports the pain has gotten worse and she cannot put any weight on it.

## 2022-09-09 ENCOUNTER — TELEPHONE (OUTPATIENT)
Dept: PHYSICAL THERAPY | Facility: HOSPITAL | Age: 47
End: 2022-09-09

## 2022-09-30 ENCOUNTER — OFFICE VISIT (OUTPATIENT)
Dept: FAMILY MEDICINE CLINIC | Facility: CLINIC | Age: 47
End: 2022-09-30

## 2022-09-30 VITALS
WEIGHT: 262 LBS | RESPIRATION RATE: 16 BRPM | DIASTOLIC BLOOD PRESSURE: 80 MMHG | OXYGEN SATURATION: 99 % | SYSTOLIC BLOOD PRESSURE: 132 MMHG | HEIGHT: 62 IN | BODY MASS INDEX: 48.21 KG/M2 | HEART RATE: 67 BPM

## 2022-09-30 DIAGNOSIS — M25.561 ACUTE PAIN OF BOTH KNEES: Primary | ICD-10-CM

## 2022-09-30 DIAGNOSIS — M17.0 PRIMARY OSTEOARTHRITIS OF BOTH KNEES: ICD-10-CM

## 2022-09-30 DIAGNOSIS — M25.562 ACUTE PAIN OF BOTH KNEES: Primary | ICD-10-CM

## 2022-09-30 PROCEDURE — 3008F BODY MASS INDEX DOCD: CPT | Performed by: FAMILY MEDICINE

## 2022-09-30 PROCEDURE — 3075F SYST BP GE 130 - 139MM HG: CPT | Performed by: FAMILY MEDICINE

## 2022-09-30 PROCEDURE — 99213 OFFICE O/P EST LOW 20 MIN: CPT | Performed by: FAMILY MEDICINE

## 2022-09-30 PROCEDURE — 3079F DIAST BP 80-89 MM HG: CPT | Performed by: FAMILY MEDICINE

## 2022-10-12 ENCOUNTER — HOSPITAL ENCOUNTER (OUTPATIENT)
Age: 47
Discharge: HOME | End: 2022-10-12
Payer: COMMERCIAL

## 2022-10-12 VITALS
HEIGHT: 62 IN | TEMPERATURE: 98 F | BODY MASS INDEX: 47.84 KG/M2 | DIASTOLIC BLOOD PRESSURE: 81 MMHG | SYSTOLIC BLOOD PRESSURE: 126 MMHG | RESPIRATION RATE: 20 BRPM | HEART RATE: 71 BPM | OXYGEN SATURATION: 100 % | WEIGHT: 260 LBS

## 2022-10-12 DIAGNOSIS — M79.10 MUSCLE ACHE: Primary | ICD-10-CM

## 2022-10-12 LAB — SARS-COV-2 RNA RESP QL NAA+PROBE: NOT DETECTED

## 2022-10-12 PROCEDURE — U0002 COVID-19 LAB TEST NON-CDC: HCPCS | Performed by: NURSE PRACTITIONER

## 2022-10-12 PROCEDURE — 99213 OFFICE O/P EST LOW 20 MIN: CPT | Performed by: NURSE PRACTITIONER

## 2022-10-12 RX ORDER — CYCLOBENZAPRINE HCL 10 MG
10 TABLET ORAL 3 TIMES DAILY PRN
Qty: 20 TABLET | Refills: 0 | Status: SHIPPED | OUTPATIENT
Start: 2022-10-12 | End: 2022-10-19

## 2022-10-12 NOTE — ED INITIAL ASSESSMENT (HPI)
No known injury. Reproducible pain to clavicle & sides of neck. Also shoulder pain w ROM. Started last last noc. Hx of carapal tunnel has chronic numbness or tingling to both hands/fingers. No relief w Naproxen has taken total of 3 today. Denies CP or SOB.

## 2022-10-17 DIAGNOSIS — K21.9 GASTROESOPHAGEAL REFLUX DISEASE, UNSPECIFIED WHETHER ESOPHAGITIS PRESENT: Primary | ICD-10-CM

## 2022-10-17 RX ORDER — OMEPRAZOLE 40 MG/1
CAPSULE, DELAYED RELEASE ORAL
Qty: 90 CAPSULE | Refills: 1 | Status: SHIPPED | OUTPATIENT
Start: 2022-10-17

## 2022-10-29 ENCOUNTER — HOSPITAL ENCOUNTER (OUTPATIENT)
Dept: LAB | Facility: HOSPITAL | Age: 47
Discharge: HOME | End: 2022-10-29
Attending: INTERNAL MEDICINE
Payer: COMMERCIAL

## 2022-10-29 LAB
ALBUMIN SERPL-MCNC: 3.7 G/DL (ref 3.4–5)
ALBUMIN/GLOB SERPL: 0.9 {RATIO} (ref 1–2)
ALP LIVER SERPL-CCNC: 142 U/L
ALT SERPL-CCNC: 18 U/L
ANION GAP SERPL CALC-SCNC: 3 MMOL/L (ref 0–18)
AST SERPL-CCNC: 10 U/L (ref 15–37)
BILIRUB SERPL-MCNC: 0.4 MG/DL (ref 0.1–2)
BUN BLD-MCNC: 12 MG/DL (ref 7–18)
CALCIUM BLD-MCNC: 8.6 MG/DL (ref 8.5–10.1)
CHLORIDE SERPL-SCNC: 113 MMOL/L (ref 98–112)
CHOLEST SERPL-MCNC: 222 MG/DL (ref ?–200)
CO2 SERPL-SCNC: 25 MMOL/L (ref 21–32)
CREAT BLD-MCNC: 0.81 MG/DL
FASTING PATIENT LIPID ANSWER: YES
FASTING STATUS PATIENT QL REPORTED: YES
GFR SERPLBLD BASED ON 1.73 SQ M-ARVRAT: 91 ML/MIN/1.73M2 (ref 60–?)
GLOBULIN PLAS-MCNC: 4 G/DL (ref 2.8–4.4)
GLUCOSE BLD-MCNC: 101 MG/DL (ref 70–99)
HDLC SERPL-MCNC: 61 MG/DL (ref 40–59)
LDLC SERPL CALC-MCNC: 150 MG/DL (ref ?–100)
NONHDLC SERPL-MCNC: 161 MG/DL (ref ?–130)
OSMOLALITY SERPL CALC.SUM OF ELEC: 292 MOSM/KG (ref 275–295)
POTASSIUM SERPL-SCNC: 4.2 MMOL/L (ref 3.5–5.1)
PROT SERPL-MCNC: 7.7 G/DL (ref 6.4–8.2)
SODIUM SERPL-SCNC: 141 MMOL/L (ref 136–145)
T4 FREE SERPL-MCNC: 1.1 NG/DL (ref 0.8–1.7)
TRIGL SERPL-MCNC: 61 MG/DL (ref 30–149)
TSI SER-ACNC: 1.92 MIU/ML (ref 0.36–3.74)
VLDLC SERPL CALC-MCNC: 11 MG/DL (ref 0–30)

## 2022-10-29 PROCEDURE — 80061 LIPID PANEL: CPT | Performed by: INTERNAL MEDICINE

## 2022-10-29 PROCEDURE — 36415 COLL VENOUS BLD VENIPUNCTURE: CPT | Performed by: INTERNAL MEDICINE

## 2022-10-29 PROCEDURE — 84439 ASSAY OF FREE THYROXINE: CPT | Performed by: INTERNAL MEDICINE

## 2022-10-29 PROCEDURE — 84443 ASSAY THYROID STIM HORMONE: CPT | Performed by: INTERNAL MEDICINE

## 2022-10-29 PROCEDURE — 80053 COMPREHEN METABOLIC PANEL: CPT | Performed by: INTERNAL MEDICINE

## 2022-10-31 ENCOUNTER — OFFICE VISIT (OUTPATIENT)
Dept: FAMILY MEDICINE CLINIC | Facility: CLINIC | Age: 47
End: 2022-10-31
Payer: COMMERCIAL

## 2022-10-31 VITALS
SYSTOLIC BLOOD PRESSURE: 108 MMHG | HEIGHT: 62 IN | BODY MASS INDEX: 48.4 KG/M2 | HEART RATE: 62 BPM | RESPIRATION RATE: 16 BRPM | WEIGHT: 263 LBS | DIASTOLIC BLOOD PRESSURE: 72 MMHG | OXYGEN SATURATION: 98 %

## 2022-10-31 DIAGNOSIS — Z23 NEED FOR VACCINATION: ICD-10-CM

## 2022-10-31 DIAGNOSIS — E55.9 VITAMIN D DEFICIENCY: ICD-10-CM

## 2022-10-31 DIAGNOSIS — Z00.00 ROUTINE GENERAL MEDICAL EXAMINATION AT A HEALTH CARE FACILITY: Primary | ICD-10-CM

## 2022-10-31 DIAGNOSIS — Z12.4 SCREENING FOR MALIGNANT NEOPLASM OF CERVIX: ICD-10-CM

## 2022-10-31 DIAGNOSIS — M72.2 BILATERAL PLANTAR FASCIITIS: ICD-10-CM

## 2022-10-31 DIAGNOSIS — Z00.00 LABORATORY EXAMINATION ORDERED AS PART OF A ROUTINE GENERAL MEDICAL EXAMINATION: ICD-10-CM

## 2022-10-31 DIAGNOSIS — Z12.11 SCREENING FOR MALIGNANT NEOPLASM OF COLON: ICD-10-CM

## 2022-10-31 DIAGNOSIS — Z12.31 SCREENING MAMMOGRAM FOR BREAST CANCER: ICD-10-CM

## 2022-10-31 DIAGNOSIS — R73.03 PREDIABETES: ICD-10-CM

## 2022-10-31 DIAGNOSIS — Z13.89 SCREENING FOR GENITOURINARY CONDITION: ICD-10-CM

## 2022-10-31 LAB
BASOPHILS # BLD AUTO: 0.05 X10(3) UL (ref 0–0.2)
BASOPHILS NFR BLD AUTO: 0.7 %
BILIRUB UR QL STRIP.AUTO: NEGATIVE
CLARITY UR REFRACT.AUTO: CLEAR
COLOR UR AUTO: YELLOW
EOSINOPHIL # BLD AUTO: 0.09 X10(3) UL (ref 0–0.7)
EOSINOPHIL NFR BLD AUTO: 1.3 %
ERYTHROCYTE [DISTWIDTH] IN BLOOD BY AUTOMATED COUNT: 16 %
EST. AVERAGE GLUCOSE BLD GHB EST-MCNC: 131 MG/DL (ref 68–126)
GLUCOSE UR STRIP.AUTO-MCNC: NEGATIVE MG/DL
HBA1C MFR BLD: 6.2 % (ref ?–5.7)
HCT VFR BLD AUTO: 38.4 %
HGB BLD-MCNC: 11.9 G/DL
IMM GRANULOCYTES # BLD AUTO: 0.01 X10(3) UL (ref 0–1)
IMM GRANULOCYTES NFR BLD: 0.1 %
KETONES UR STRIP.AUTO-MCNC: NEGATIVE MG/DL
LEUKOCYTE ESTERASE UR QL STRIP.AUTO: NEGATIVE
LYMPHOCYTES # BLD AUTO: 2.08 X10(3) UL (ref 1–4)
LYMPHOCYTES NFR BLD AUTO: 30.1 %
MCH RBC QN AUTO: 26 PG (ref 26–34)
MCHC RBC AUTO-ENTMCNC: 31 G/DL (ref 31–37)
MCV RBC AUTO: 83.8 FL
MONOCYTES # BLD AUTO: 0.47 X10(3) UL (ref 0.1–1)
MONOCYTES NFR BLD AUTO: 6.8 %
NEUTROPHILS # BLD AUTO: 4.2 X10 (3) UL (ref 1.5–7.7)
NEUTROPHILS # BLD AUTO: 4.2 X10(3) UL (ref 1.5–7.7)
NEUTROPHILS NFR BLD AUTO: 61 %
NITRITE UR QL STRIP.AUTO: NEGATIVE
PH UR STRIP.AUTO: 5 [PH] (ref 5–8)
PLATELET # BLD AUTO: 302 10(3)UL (ref 150–450)
PROT UR STRIP.AUTO-MCNC: NEGATIVE MG/DL
RBC # BLD AUTO: 4.58 X10(6)UL
RBC UR QL AUTO: NEGATIVE
SP GR UR STRIP.AUTO: 1.02 (ref 1–1.03)
UROBILINOGEN UR STRIP.AUTO-MCNC: <2 MG/DL
VIT D+METAB SERPL-MCNC: 26.4 NG/ML (ref 30–100)
WBC # BLD AUTO: 6.9 X10(3) UL (ref 4–11)

## 2022-10-31 PROCEDURE — 90471 IMMUNIZATION ADMIN: CPT | Performed by: FAMILY MEDICINE

## 2022-10-31 PROCEDURE — 83036 HEMOGLOBIN GLYCOSYLATED A1C: CPT | Performed by: FAMILY MEDICINE

## 2022-10-31 PROCEDURE — 99396 PREV VISIT EST AGE 40-64: CPT | Performed by: FAMILY MEDICINE

## 2022-10-31 PROCEDURE — 90686 IIV4 VACC NO PRSV 0.5 ML IM: CPT | Performed by: FAMILY MEDICINE

## 2022-10-31 PROCEDURE — 81003 URINALYSIS AUTO W/O SCOPE: CPT | Performed by: FAMILY MEDICINE

## 2022-10-31 PROCEDURE — 3074F SYST BP LT 130 MM HG: CPT | Performed by: FAMILY MEDICINE

## 2022-10-31 PROCEDURE — 3008F BODY MASS INDEX DOCD: CPT | Performed by: FAMILY MEDICINE

## 2022-10-31 PROCEDURE — 85025 COMPLETE CBC W/AUTO DIFF WBC: CPT | Performed by: FAMILY MEDICINE

## 2022-10-31 PROCEDURE — 3078F DIAST BP <80 MM HG: CPT | Performed by: FAMILY MEDICINE

## 2022-10-31 PROCEDURE — 82306 VITAMIN D 25 HYDROXY: CPT | Performed by: FAMILY MEDICINE

## 2022-11-20 ENCOUNTER — APPOINTMENT (OUTPATIENT)
Dept: CT IMAGING | Age: 47
End: 2022-11-20
Attending: STUDENT IN AN ORGANIZED HEALTH CARE EDUCATION/TRAINING PROGRAM
Payer: COMMERCIAL

## 2022-11-20 ENCOUNTER — HOSPITAL ENCOUNTER (OUTPATIENT)
Age: 47
Discharge: HOME | End: 2022-11-20
Attending: STUDENT IN AN ORGANIZED HEALTH CARE EDUCATION/TRAINING PROGRAM
Payer: COMMERCIAL

## 2022-11-20 VITALS
HEART RATE: 62 BPM | RESPIRATION RATE: 18 BRPM | OXYGEN SATURATION: 100 % | DIASTOLIC BLOOD PRESSURE: 94 MMHG | SYSTOLIC BLOOD PRESSURE: 163 MMHG | WEIGHT: 260 LBS | HEIGHT: 62 IN | BODY MASS INDEX: 47.84 KG/M2 | TEMPERATURE: 98 F

## 2022-11-20 DIAGNOSIS — R10.9 ABDOMINAL PAIN OF UNKNOWN ETIOLOGY: Primary | ICD-10-CM

## 2022-11-20 LAB
#MXD IC: 0.5 X10ˆ3/UL (ref 0.1–1)
B-HCG UR QL: NEGATIVE
BUN BLD-MCNC: 12 MG/DL (ref 7–18)
CHLORIDE BLD-SCNC: 104 MMOL/L (ref 98–112)
CO2 BLD-SCNC: 29 MMOL/L (ref 21–32)
CREAT BLD-MCNC: 0.6 MG/DL
GFR SERPLBLD BASED ON 1.73 SQ M-ARVRAT: 113 ML/MIN/1.73M2 (ref 60–?)
GLUCOSE BLD-MCNC: 80 MG/DL (ref 70–99)
HCT VFR BLD AUTO: 37.3 %
HCT VFR BLD CALC: 37 %
HGB BLD-MCNC: 11.1 G/DL
ISTAT IONIZED CALCIUM FOR CHEM 8: 1.23 MMOL/L (ref 1.12–1.32)
LYMPHOCYTES # BLD AUTO: 2.7 X10ˆ3/UL (ref 1–4)
LYMPHOCYTES NFR BLD AUTO: 36.7 %
MCH RBC QN AUTO: 25.3 PG (ref 26–34)
MCHC RBC AUTO-ENTMCNC: 29.8 G/DL (ref 31–37)
MCV RBC AUTO: 85 FL (ref 80–100)
MIXED CELL %: 6.8 %
NEUTROPHILS # BLD AUTO: 4.1 X10ˆ3/UL (ref 1.5–7.7)
NEUTROPHILS NFR BLD AUTO: 56.5 %
PLATELET # BLD AUTO: 299 X10ˆ3/UL (ref 150–450)
POCT BILIRUBIN URINE: NEGATIVE
POCT BLOOD URINE: NEGATIVE
POCT GLUCOSE URINE: NEGATIVE MG/DL
POCT KETONE URINE: NEGATIVE MG/DL
POCT LEUKOCYTE ESTERASE URINE: NEGATIVE
POCT NITRITE URINE: NEGATIVE
POCT PH URINE: 8.5 (ref 5–8)
POCT PROTEIN URINE: NEGATIVE MG/DL
POCT SPECIFIC GRAVITY URINE: 1.02
POCT URINE CLARITY: CLEAR
POCT URINE COLOR: YELLOW
POCT UROBILINOGEN URINE: 0.2 MG/DL
POTASSIUM BLD-SCNC: 4.4 MMOL/L (ref 3.6–5.1)
RBC # BLD AUTO: 4.39 X10ˆ6/UL
SODIUM BLD-SCNC: 141 MMOL/L (ref 136–145)
WBC # BLD AUTO: 7.3 X10ˆ3/UL (ref 4–11)

## 2022-11-20 PROCEDURE — 99214 OFFICE O/P EST MOD 30 MIN: CPT

## 2022-11-20 PROCEDURE — 74177 CT ABD & PELVIS W/CONTRAST: CPT | Performed by: STUDENT IN AN ORGANIZED HEALTH CARE EDUCATION/TRAINING PROGRAM

## 2022-11-20 PROCEDURE — 81002 URINALYSIS NONAUTO W/O SCOPE: CPT | Performed by: STUDENT IN AN ORGANIZED HEALTH CARE EDUCATION/TRAINING PROGRAM

## 2022-11-20 PROCEDURE — 80047 BASIC METABLC PNL IONIZED CA: CPT

## 2022-11-20 PROCEDURE — 81025 URINE PREGNANCY TEST: CPT

## 2022-11-20 PROCEDURE — 85025 COMPLETE CBC W/AUTO DIFF WBC: CPT | Performed by: STUDENT IN AN ORGANIZED HEALTH CARE EDUCATION/TRAINING PROGRAM

## 2022-11-20 PROCEDURE — 96374 THER/PROPH/DIAG INJ IV PUSH: CPT

## 2022-11-20 PROCEDURE — 99215 OFFICE O/P EST HI 40 MIN: CPT

## 2022-11-20 RX ORDER — KETOROLAC TROMETHAMINE 30 MG/ML
15 INJECTION, SOLUTION INTRAMUSCULAR; INTRAVENOUS ONCE
Status: COMPLETED | OUTPATIENT
Start: 2022-11-20 | End: 2022-11-20

## 2022-11-20 NOTE — ED INITIAL ASSESSMENT (HPI)
Pt states having left lower abd pain that woke her up in the middle of the night. Constant dull ache. Denies n/v/d. Denies urinary symptoms. Has been experiencing some abd bloating over the last week.

## 2022-11-30 ENCOUNTER — OFFICE VISIT (OUTPATIENT)
Dept: INTERNAL MEDICINE CLINIC | Facility: CLINIC | Age: 47
End: 2022-11-30
Payer: COMMERCIAL

## 2022-11-30 VITALS
RESPIRATION RATE: 16 BRPM | WEIGHT: 265 LBS | DIASTOLIC BLOOD PRESSURE: 76 MMHG | BODY MASS INDEX: 44.15 KG/M2 | HEART RATE: 72 BPM | HEIGHT: 65 IN | OXYGEN SATURATION: 98 % | SYSTOLIC BLOOD PRESSURE: 128 MMHG

## 2022-11-30 DIAGNOSIS — Z51.81 ENCOUNTER FOR THERAPEUTIC DRUG MONITORING: Primary | ICD-10-CM

## 2022-11-30 DIAGNOSIS — R00.2 PALPITATIONS: ICD-10-CM

## 2022-11-30 DIAGNOSIS — R73.03 PREDIABETES: ICD-10-CM

## 2022-11-30 DIAGNOSIS — E78.00 ELEVATED CHOLESTEROL: ICD-10-CM

## 2022-11-30 DIAGNOSIS — Z91.89 CARDIOVASCULAR RISK FACTOR: ICD-10-CM

## 2022-11-30 DIAGNOSIS — E66.01 CLASS 3 SEVERE OBESITY WITH SERIOUS COMORBIDITY AND BODY MASS INDEX (BMI) OF 40.0 TO 44.9 IN ADULT, UNSPECIFIED OBESITY TYPE (HCC): ICD-10-CM

## 2022-11-30 DIAGNOSIS — E55.9 VITAMIN D DEFICIENCY: ICD-10-CM

## 2022-11-30 PROCEDURE — 3078F DIAST BP <80 MM HG: CPT | Performed by: NURSE PRACTITIONER

## 2022-11-30 PROCEDURE — 3008F BODY MASS INDEX DOCD: CPT | Performed by: NURSE PRACTITIONER

## 2022-11-30 PROCEDURE — 3074F SYST BP LT 130 MM HG: CPT | Performed by: NURSE PRACTITIONER

## 2022-11-30 PROCEDURE — 99214 OFFICE O/P EST MOD 30 MIN: CPT | Performed by: NURSE PRACTITIONER

## 2022-11-30 RX ORDER — SEMAGLUTIDE 1.34 MG/ML
0.5 INJECTION, SOLUTION SUBCUTANEOUS WEEKLY
Qty: 1.5 ML | Refills: 1 | Status: SHIPPED | OUTPATIENT
Start: 2022-11-30

## 2022-12-06 ENCOUNTER — HOSPITAL ENCOUNTER (OUTPATIENT)
Dept: MAMMOGRAPHY | Age: 47
Discharge: HOME | End: 2022-12-06
Attending: FAMILY MEDICINE
Payer: COMMERCIAL

## 2022-12-06 ENCOUNTER — TELEPHONE (OUTPATIENT)
Dept: INTERNAL MEDICINE CLINIC | Facility: CLINIC | Age: 47
End: 2022-12-06

## 2022-12-06 DIAGNOSIS — Z12.31 SCREENING MAMMOGRAM FOR BREAST CANCER: ICD-10-CM

## 2022-12-06 PROCEDURE — 77063 BREAST TOMOSYNTHESIS BI: CPT | Performed by: FAMILY MEDICINE

## 2022-12-06 PROCEDURE — 77067 SCR MAMMO BI INCL CAD: CPT | Performed by: FAMILY MEDICINE

## 2022-12-06 NOTE — TELEPHONE ENCOUNTER
PA requested for Ozempic 0.25/0.5 mg    Will try to initiate in epic  Entered in [address] - awaiting determination    Class 3 severe obesity with serious comorbidity and body mass index (BMI) of 40.0 to 44.9 in adult, unspecified obesity type (HCC)  E66.01, Z68.41    Prediabetes  R73.03      Elevated cholesterol  E78.00    Cardiovascular risk factor  Z91.89

## 2022-12-07 ENCOUNTER — TELEPHONE (OUTPATIENT)
Dept: PHYSICAL THERAPY | Facility: HOSPITAL | Age: 47
End: 2022-12-07

## 2022-12-07 ENCOUNTER — APPOINTMENT (OUTPATIENT)
Dept: PHYSICAL THERAPY | Facility: HOSPITAL | Age: 47
End: 2022-12-07
Attending: FAMILY MEDICINE
Payer: COMMERCIAL

## 2022-12-07 NOTE — TELEPHONE ENCOUNTER
PA for Ozempic denied by [facility] [place]  [id number]    Patient must have A1c greater than 6.5%  Or  or greater    How do you want to proceed?

## 2022-12-09 ENCOUNTER — TELEPHONE (OUTPATIENT)
Dept: PHYSICAL THERAPY | Facility: HOSPITAL | Age: 47
End: 2022-12-09

## 2022-12-10 ENCOUNTER — APPOINTMENT (OUTPATIENT)
Dept: ULTRASOUND IMAGING | Age: 47
End: 2022-12-10
Attending: EMERGENCY MEDICINE
Payer: COMMERCIAL

## 2022-12-10 ENCOUNTER — HOSPITAL ENCOUNTER (OUTPATIENT)
Age: 47
Discharge: HOME | End: 2022-12-10
Attending: EMERGENCY MEDICINE
Payer: COMMERCIAL

## 2022-12-10 VITALS
DIASTOLIC BLOOD PRESSURE: 90 MMHG | SYSTOLIC BLOOD PRESSURE: 152 MMHG | RESPIRATION RATE: 18 BRPM | WEIGHT: 260 LBS | TEMPERATURE: 98 F | HEART RATE: 70 BPM | OXYGEN SATURATION: 100 % | BODY MASS INDEX: 43 KG/M2

## 2022-12-10 DIAGNOSIS — D25.9 UTERINE LEIOMYOMA, UNSPECIFIED LOCATION: Primary | ICD-10-CM

## 2022-12-10 DIAGNOSIS — N91.5 OLIGOMENORRHEA, UNSPECIFIED TYPE: ICD-10-CM

## 2022-12-10 LAB
B-HCG UR QL: NEGATIVE
POCT BILIRUBIN URINE: NEGATIVE
POCT GLUCOSE URINE: NEGATIVE MG/DL
POCT KETONE URINE: NEGATIVE MG/DL
POCT LEUKOCYTE ESTERASE URINE: NEGATIVE
POCT NITRITE URINE: NEGATIVE
POCT PH URINE: 7 (ref 5–8)
POCT PROTEIN URINE: NEGATIVE MG/DL
POCT SPECIFIC GRAVITY URINE: 1.02
POCT URINE CLARITY: CLEAR
POCT URINE COLOR: YELLOW
POCT UROBILINOGEN URINE: 0.2 MG/DL

## 2022-12-10 PROCEDURE — 76830 TRANSVAGINAL US NON-OB: CPT | Performed by: EMERGENCY MEDICINE

## 2022-12-10 PROCEDURE — 81002 URINALYSIS NONAUTO W/O SCOPE: CPT | Performed by: EMERGENCY MEDICINE

## 2022-12-10 PROCEDURE — 81025 URINE PREGNANCY TEST: CPT

## 2022-12-10 PROCEDURE — 76856 US EXAM PELVIC COMPLETE: CPT | Performed by: EMERGENCY MEDICINE

## 2022-12-10 PROCEDURE — 99214 OFFICE O/P EST MOD 30 MIN: CPT

## 2022-12-10 NOTE — ED INITIAL ASSESSMENT (HPI)
Patient reports lower abdominal cramping for past 3 weeks that radiates all across to both sides. Also describes as pressure. Denies n/v/d/c. Denies urinary symptoms. Denies fever. Reports some slight tenderness to left groin area.  Reports she has had ovarian cysts in past. States she has had some irregular periods, has not had a normal one since August.

## 2022-12-11 NOTE — TELEPHONE ENCOUNTER
Please notify patient. Have her check Wegovy coverage in the new year. Recommend Metformin  mg, si tab daily with meal for 7 days, then increase to 2 tabs daily. #60 with 1 refill. Please confirm f/u is scheduled and I recommend f/u with dietician, Gonzales Dolan or Angel Luis Prather. Pt with previous interest in bariatric surgery and saw Tristan Aleman prior in  with recommendation for daily carbs <100 and protein >60 grams.

## 2022-12-12 RX ORDER — METFORMIN HYDROCHLORIDE 750 MG/1
750 TABLET, EXTENDED RELEASE ORAL
Qty: 60 TABLET | Refills: 1 | Status: SHIPPED | OUTPATIENT
Start: 2022-12-12

## 2022-12-19 ENCOUNTER — TELEPHONE (OUTPATIENT)
Dept: PHYSICAL THERAPY | Facility: HOSPITAL | Age: 47
End: 2022-12-19

## 2022-12-19 ENCOUNTER — APPOINTMENT (OUTPATIENT)
Dept: PHYSICAL THERAPY | Facility: HOSPITAL | Age: 47
End: 2022-12-19
Attending: FAMILY MEDICINE
Payer: COMMERCIAL

## 2023-01-04 ENCOUNTER — OFFICE VISIT (OUTPATIENT)
Dept: PHYSICAL THERAPY | Facility: HOSPITAL | Age: 48
End: 2023-01-04
Attending: FAMILY MEDICINE
Payer: COMMERCIAL

## 2023-01-04 DIAGNOSIS — M72.2 BILATERAL PLANTAR FASCIITIS: ICD-10-CM

## 2023-01-04 PROCEDURE — 97110 THERAPEUTIC EXERCISES: CPT

## 2023-01-04 PROCEDURE — 97161 PT EVAL LOW COMPLEX 20 MIN: CPT

## 2023-01-09 ENCOUNTER — OFFICE VISIT (OUTPATIENT)
Dept: PHYSICAL THERAPY | Facility: HOSPITAL | Age: 48
End: 2023-01-09
Attending: FAMILY MEDICINE
Payer: COMMERCIAL

## 2023-01-09 PROCEDURE — 97140 MANUAL THERAPY 1/> REGIONS: CPT

## 2023-01-09 PROCEDURE — 97110 THERAPEUTIC EXERCISES: CPT

## 2023-01-11 ENCOUNTER — APPOINTMENT (OUTPATIENT)
Dept: PHYSICAL THERAPY | Facility: HOSPITAL | Age: 48
End: 2023-01-11
Attending: FAMILY MEDICINE
Payer: COMMERCIAL

## 2023-01-16 ENCOUNTER — TELEPHONE (OUTPATIENT)
Dept: PHYSICAL THERAPY | Facility: HOSPITAL | Age: 48
End: 2023-01-16

## 2023-01-16 ENCOUNTER — APPOINTMENT (OUTPATIENT)
Dept: PHYSICAL THERAPY | Facility: HOSPITAL | Age: 48
End: 2023-01-16
Attending: FAMILY MEDICINE
Payer: COMMERCIAL

## 2023-01-17 RX ORDER — METFORMIN HYDROCHLORIDE 750 MG/1
TABLET, EXTENDED RELEASE ORAL
Qty: 162 TABLET | Refills: 0 | OUTPATIENT
Start: 2023-01-17

## 2023-01-31 ENCOUNTER — TELEMEDICINE (OUTPATIENT)
Dept: INTERNAL MEDICINE CLINIC | Facility: CLINIC | Age: 48
End: 2023-01-31
Payer: COMMERCIAL

## 2023-01-31 DIAGNOSIS — E55.9 VITAMIN D DEFICIENCY: ICD-10-CM

## 2023-01-31 DIAGNOSIS — R73.03 PREDIABETES: ICD-10-CM

## 2023-01-31 DIAGNOSIS — Z51.81 ENCOUNTER FOR THERAPEUTIC DRUG MONITORING: Primary | ICD-10-CM

## 2023-01-31 DIAGNOSIS — E66.01 CLASS 3 SEVERE OBESITY WITH SERIOUS COMORBIDITY AND BODY MASS INDEX (BMI) OF 40.0 TO 44.9 IN ADULT, UNSPECIFIED OBESITY TYPE (HCC): ICD-10-CM

## 2023-01-31 PROBLEM — E66.813 CLASS 3 SEVERE OBESITY WITH SERIOUS COMORBIDITY AND BODY MASS INDEX (BMI) OF 40.0 TO 44.9 IN ADULT (HCC): Status: ACTIVE | Noted: 2018-12-06

## 2023-01-31 PROBLEM — E66.813 CLASS 3 SEVERE OBESITY WITH SERIOUS COMORBIDITY AND BODY MASS INDEX (BMI) OF 40.0 TO 44.9 IN ADULT: Status: ACTIVE | Noted: 2018-12-06

## 2023-01-31 PROCEDURE — 99213 OFFICE O/P EST LOW 20 MIN: CPT | Performed by: NURSE PRACTITIONER

## 2023-01-31 RX ORDER — SEMAGLUTIDE 0.25 MG/.5ML
0.25 INJECTION, SOLUTION SUBCUTANEOUS WEEKLY
Qty: 4 EACH | Refills: 0 | Status: SHIPPED | OUTPATIENT
Start: 2023-01-31

## 2023-01-31 RX ORDER — METFORMIN HYDROCHLORIDE 750 MG/1
1500 TABLET, EXTENDED RELEASE ORAL
Qty: 60 TABLET | Refills: 2 | Status: SHIPPED | OUTPATIENT
Start: 2023-01-31

## 2023-02-01 ENCOUNTER — TELEPHONE (OUTPATIENT)
Dept: INTERNAL MEDICINE CLINIC | Facility: CLINIC | Age: 48
End: 2023-02-01

## 2023-02-01 ENCOUNTER — OFFICE VISIT (OUTPATIENT)
Dept: FAMILY MEDICINE CLINIC | Facility: CLINIC | Age: 48
End: 2023-02-01
Payer: COMMERCIAL

## 2023-02-01 VITALS
BODY MASS INDEX: 42.99 KG/M2 | OXYGEN SATURATION: 100 % | HEIGHT: 65 IN | WEIGHT: 258 LBS | HEART RATE: 83 BPM | SYSTOLIC BLOOD PRESSURE: 118 MMHG | RESPIRATION RATE: 16 BRPM | DIASTOLIC BLOOD PRESSURE: 72 MMHG

## 2023-02-01 DIAGNOSIS — M72.2 PLANTAR FASCIITIS: Primary | ICD-10-CM

## 2023-02-01 DIAGNOSIS — R51.9 TEMPORAL HEADACHE: ICD-10-CM

## 2023-02-01 DIAGNOSIS — R09.81 SINUS CONGESTION: ICD-10-CM

## 2023-02-01 DIAGNOSIS — Z71.85 VACCINE COUNSELING: ICD-10-CM

## 2023-02-01 PROCEDURE — 3008F BODY MASS INDEX DOCD: CPT | Performed by: FAMILY MEDICINE

## 2023-02-01 PROCEDURE — 3074F SYST BP LT 130 MM HG: CPT | Performed by: FAMILY MEDICINE

## 2023-02-01 PROCEDURE — 99214 OFFICE O/P EST MOD 30 MIN: CPT | Performed by: FAMILY MEDICINE

## 2023-02-01 PROCEDURE — 3078F DIAST BP <80 MM HG: CPT | Performed by: FAMILY MEDICINE

## 2023-02-01 NOTE — TELEPHONE ENCOUNTER
PA done in epic.     Class 3 severe obesity with serious comorbidity and body mass index (BMI) of 40.0 to 44.9 in adult, unspecified obesity type (HCC)  E66.01, Z68.41    Prediabetes  R73.03    Vitamin D deficiency  E55.9      257LB

## 2023-02-03 NOTE — TELEPHONE ENCOUNTER
Bairon Epstein is denied coverage as it is not a covered benefit and excluded from plan. How do you want to proceed? W Plasty Text: The lesion was extirpated to the level of the fat with a #15 scalpel blade.  Given the location of the defect, shape of the defect and the proximity to free margins a W-plasty was deemed most appropriate for repair.  Using a sterile surgical marker, the appropriate transposition arms of the W-plasty were drawn incorporating the defect and placing the expected incisions within the relaxed skin tension lines where possible.    The area thus outlined was incised deep to adipose tissue with a #15 scalpel blade.  The skin margins were undermined to an appropriate distance in all directions utilizing iris scissors.  The opposing transposition arms were then transposed into place in opposite direction and anchored with interrupted buried subcutaneous sutures.

## 2023-02-03 NOTE — TELEPHONE ENCOUNTER
Can try generic alternative to Contrave ([email or web address].com) with Wellbutrin  mg daily in AM #30 with 1 refill and Naltrexone 50 mg, si/2 tab daily with food #15 with 1 refill. Make sure f/u on books. Wellbutrin XL SEs can be increased energy, dry mouth, constipation, elevated BP or pulse. Naltrexone SEs include nausea - should be taken with food. Can visit Contrave website to learn more. If not interested in adding this therapy recommend focus strongly on lifestyle intervention and consider bariatric surgery.

## 2023-02-10 ENCOUNTER — PATIENT MESSAGE (OUTPATIENT)
Dept: INTERNAL MEDICINE CLINIC | Facility: CLINIC | Age: 48
End: 2023-02-10

## 2023-02-13 ENCOUNTER — APPOINTMENT (OUTPATIENT)
Dept: PHYSICAL THERAPY | Facility: HOSPITAL | Age: 48
End: 2023-02-13
Attending: FAMILY MEDICINE
Payer: COMMERCIAL

## 2023-02-15 ENCOUNTER — TELEPHONE (OUTPATIENT)
Dept: FAMILY MEDICINE CLINIC | Facility: CLINIC | Age: 48
End: 2023-02-15

## 2023-02-15 NOTE — TELEPHONE ENCOUNTER
Clare Ruiz is calling regarding the approval for her Ozempic, she would like to speak with a nurse, Please call Fan Kurtz at [phone]

## 2023-02-16 ENCOUNTER — TELEPHONE (OUTPATIENT)
Facility: LOCATION | Age: 48
End: 2023-02-16

## 2023-02-16 RX ORDER — SEMAGLUTIDE 1.34 MG/ML
0.25 INJECTION, SOLUTION SUBCUTANEOUS WEEKLY
Qty: 1.5 ML | Refills: 0 | Status: SHIPPED | OUTPATIENT
Start: 2023-02-16

## 2023-02-21 ENCOUNTER — TELEPHONE (OUTPATIENT)
Dept: INTERNAL MEDICINE CLINIC | Facility: CLINIC | Age: 48
End: 2023-02-21

## 2023-02-22 NOTE — TELEPHONE ENCOUNTER
Ozempic denied coverage from insurance after we applied since patient is not diabetic. How do you want to proceed?

## 2023-02-28 ENCOUNTER — PATIENT MESSAGE (OUTPATIENT)
Dept: FAMILY MEDICINE CLINIC | Facility: CLINIC | Age: 48
End: 2023-02-28

## 2023-02-28 NOTE — TELEPHONE ENCOUNTER
From: Iesha Dumont  To: Gema Portillo DO  Sent: 2/28/2023 2:08 PM CST  Subject: Monroe Ellis,    Can I please have the information for the Podiatrist you wanted me to see? Thank you!

## 2023-03-06 NOTE — TELEPHONE ENCOUNTER
Not sure what needs to be done with this? Is she aware of the OOP cost on Wegovy? Do I need to sent a local script somewhere.  Thanks

## 2023-03-08 DIAGNOSIS — R73.03 PREDIABETES: ICD-10-CM

## 2023-03-08 DIAGNOSIS — E66.01 CLASS 3 SEVERE OBESITY WITH SERIOUS COMORBIDITY AND BODY MASS INDEX (BMI) OF 40.0 TO 44.9 IN ADULT, UNSPECIFIED OBESITY TYPE (HCC): ICD-10-CM

## 2023-03-08 DIAGNOSIS — Z51.81 ENCOUNTER FOR THERAPEUTIC DRUG MONITORING: ICD-10-CM

## 2023-03-08 RX ORDER — METFORMIN HYDROCHLORIDE 750 MG/1
TABLET, EXTENDED RELEASE ORAL
Qty: 159 TABLET | Refills: 0 | OUTPATIENT
Start: 2023-03-08

## 2023-03-08 NOTE — TELEPHONE ENCOUNTER
Requesting metformin  mg  LOV: 1/31/23  RTC: 2-3 monhts  Last Relevant Labs:  10/31/22  Filled: 1/31/23 #60 with 2 refills    Denied request to change to 90 day RX from pharmacy asking to leave as written.

## 2023-03-17 ENCOUNTER — LAB ENCOUNTER (OUTPATIENT)
Dept: LAB | Age: 48
End: 2023-03-17
Attending: NURSE PRACTITIONER
Payer: COMMERCIAL

## 2023-03-17 DIAGNOSIS — Z51.81 ENCOUNTER FOR THERAPEUTIC DRUG MONITORING: ICD-10-CM

## 2023-03-17 DIAGNOSIS — E55.9 VITAMIN D DEFICIENCY: ICD-10-CM

## 2023-03-17 DIAGNOSIS — E66.01 CLASS 3 SEVERE OBESITY WITH SERIOUS COMORBIDITY AND BODY MASS INDEX (BMI) OF 40.0 TO 44.9 IN ADULT, UNSPECIFIED OBESITY TYPE (HCC): ICD-10-CM

## 2023-03-17 DIAGNOSIS — R73.03 PREDIABETES: ICD-10-CM

## 2023-03-17 LAB
EST. AVERAGE GLUCOSE BLD GHB EST-MCNC: 128 MG/DL (ref 68–126)
HBA1C MFR BLD: 6.1 % (ref ?–5.7)
VIT D+METAB SERPL-MCNC: 27.9 NG/ML (ref 30–100)

## 2023-03-17 PROCEDURE — 36415 COLL VENOUS BLD VENIPUNCTURE: CPT

## 2023-03-17 PROCEDURE — 83036 HEMOGLOBIN GLYCOSYLATED A1C: CPT

## 2023-03-17 PROCEDURE — 82306 VITAMIN D 25 HYDROXY: CPT

## 2023-03-21 ENCOUNTER — HOSPITAL ENCOUNTER (OUTPATIENT)
Age: 48
Discharge: HOME | End: 2023-03-21
Payer: COMMERCIAL

## 2023-03-21 VITALS
SYSTOLIC BLOOD PRESSURE: 160 MMHG | WEIGHT: 260 LBS | TEMPERATURE: 99 F | BODY MASS INDEX: 43 KG/M2 | DIASTOLIC BLOOD PRESSURE: 99 MMHG | RESPIRATION RATE: 20 BRPM | HEART RATE: 87 BPM | OXYGEN SATURATION: 100 %

## 2023-03-21 DIAGNOSIS — B34.9 VIRAL SYNDROME: ICD-10-CM

## 2023-03-21 DIAGNOSIS — J06.9 VIRAL UPPER RESPIRATORY TRACT INFECTION WITH COUGH: Primary | ICD-10-CM

## 2023-03-21 LAB
POCT INFLUENZA A: NEGATIVE
POCT INFLUENZA B: NEGATIVE
SARS-COV-2 RNA RESP QL NAA+PROBE: NOT DETECTED

## 2023-03-21 PROCEDURE — 99213 OFFICE O/P EST LOW 20 MIN: CPT

## 2023-03-21 PROCEDURE — 99212 OFFICE O/P EST SF 10 MIN: CPT

## 2023-03-21 PROCEDURE — 87502 INFLUENZA DNA AMP PROBE: CPT | Performed by: PHYSICIAN ASSISTANT

## 2023-03-22 NOTE — DISCHARGE INSTRUCTIONS
Drink plenty of fluids and rest     Alternate 600 mg of ibuprofen (3 x 200mg Advil tablets) with 1 g of acetaminophen (2 x 500mg of extra strength tylenol tablets) every 4 hours for fever and aches/pains.     Fluids, rest over-the-counter cold medication

## 2023-03-27 ENCOUNTER — OFFICE VISIT (OUTPATIENT)
Dept: FAMILY MEDICINE CLINIC | Facility: CLINIC | Age: 48
End: 2023-03-27
Payer: COMMERCIAL

## 2023-03-27 ENCOUNTER — TELEPHONE (OUTPATIENT)
Dept: FAMILY MEDICINE CLINIC | Facility: CLINIC | Age: 48
End: 2023-03-27

## 2023-03-27 VITALS
OXYGEN SATURATION: 98 % | WEIGHT: 265 LBS | DIASTOLIC BLOOD PRESSURE: 76 MMHG | SYSTOLIC BLOOD PRESSURE: 128 MMHG | RESPIRATION RATE: 20 BRPM | BODY MASS INDEX: 48.76 KG/M2 | HEART RATE: 80 BPM | HEIGHT: 62 IN

## 2023-03-27 DIAGNOSIS — J04.0 ACUTE LARYNGITIS: ICD-10-CM

## 2023-03-27 DIAGNOSIS — J01.90 ACUTE NON-RECURRENT SINUSITIS, UNSPECIFIED LOCATION: Primary | ICD-10-CM

## 2023-03-27 PROCEDURE — 3074F SYST BP LT 130 MM HG: CPT | Performed by: FAMILY MEDICINE

## 2023-03-27 PROCEDURE — 3008F BODY MASS INDEX DOCD: CPT | Performed by: FAMILY MEDICINE

## 2023-03-27 PROCEDURE — 3078F DIAST BP <80 MM HG: CPT | Performed by: FAMILY MEDICINE

## 2023-03-27 PROCEDURE — 99213 OFFICE O/P EST LOW 20 MIN: CPT | Performed by: FAMILY MEDICINE

## 2023-03-27 RX ORDER — AZITHROMYCIN 250 MG/1
TABLET, FILM COATED ORAL
Qty: 6 TABLET | Refills: 0 | Status: SHIPPED | OUTPATIENT
Start: 2023-03-27 | End: 2023-04-01

## 2023-03-27 NOTE — TELEPHONE ENCOUNTER
Called and talked to pt. Who has a sore throat and very hoarse voice. Pt. Able to do video visit with Dr. Pablo Cabral at 12 noon. Appt. Scheduled. Pt. Verbalized understanding.

## 2023-03-27 NOTE — TELEPHONE ENCOUNTER
Elizabeth You was at [facility] on 03/21 for cough sore throat, chills, and fever negative for covid and flu, she can barely talk today and would like to know what Dr Sterling Bailey recommends please call Elizabeth You at [phone]

## 2023-04-06 ENCOUNTER — HOSPITAL ENCOUNTER (OUTPATIENT)
Facility: HOSPITAL | Age: 48
Setting detail: HOSPITAL OUTPATIENT SURGERY
Discharge: HOME | End: 2023-04-06
Attending: INTERNAL MEDICINE | Admitting: INTERNAL MEDICINE
Payer: COMMERCIAL

## 2023-04-06 ENCOUNTER — ANESTHESIA (OUTPATIENT)
Dept: ENDOSCOPY | Facility: HOSPITAL | Age: 48
End: 2023-04-06
Payer: COMMERCIAL

## 2023-04-06 ENCOUNTER — ANESTHESIA EVENT (OUTPATIENT)
Dept: ENDOSCOPY | Facility: HOSPITAL | Age: 48
End: 2023-04-06
Payer: COMMERCIAL

## 2023-04-06 VITALS
OXYGEN SATURATION: 96 % | HEART RATE: 59 BPM | BODY MASS INDEX: 46.93 KG/M2 | RESPIRATION RATE: 18 BRPM | HEIGHT: 62 IN | TEMPERATURE: 98 F | WEIGHT: 255 LBS | DIASTOLIC BLOOD PRESSURE: 91 MMHG | SYSTOLIC BLOOD PRESSURE: 161 MMHG

## 2023-04-06 DIAGNOSIS — Z12.11 SPECIAL SCREENING FOR MALIGNANT NEOPLASMS, COLON: ICD-10-CM

## 2023-04-06 LAB — B-HCG UR QL: NEGATIVE

## 2023-04-06 PROCEDURE — 0DJD8ZZ INSPECTION OF LOWER INTESTINAL TRACT, VIA NATURAL OR ARTIFICIAL OPENING ENDOSCOPIC: ICD-10-PCS | Performed by: INTERNAL MEDICINE

## 2023-04-06 PROCEDURE — 81025 URINE PREGNANCY TEST: CPT

## 2023-04-06 RX ORDER — ONDANSETRON 2 MG/ML
4 INJECTION INTRAMUSCULAR; INTRAVENOUS AS NEEDED
Status: DISCONTINUED | OUTPATIENT
Start: 2023-04-06 | End: 2023-04-06

## 2023-04-06 RX ORDER — SODIUM CHLORIDE, SODIUM LACTATE, POTASSIUM CHLORIDE, CALCIUM CHLORIDE 600; 310; 30; 20 MG/100ML; MG/100ML; MG/100ML; MG/100ML
INJECTION, SOLUTION INTRAVENOUS CONTINUOUS
Status: DISCONTINUED | OUTPATIENT
Start: 2023-04-06 | End: 2023-04-06

## 2023-04-06 RX ORDER — DEXTROSE MONOHYDRATE 25 G/50ML
50 INJECTION, SOLUTION INTRAVENOUS
Status: DISCONTINUED | OUTPATIENT
Start: 2023-04-06 | End: 2023-04-06

## 2023-04-06 RX ORDER — NICOTINE POLACRILEX 4 MG
30 LOZENGE BUCCAL
Status: DISCONTINUED | OUTPATIENT
Start: 2023-04-06 | End: 2023-04-06

## 2023-04-06 RX ORDER — NICOTINE POLACRILEX 4 MG
15 LOZENGE BUCCAL
Status: DISCONTINUED | OUTPATIENT
Start: 2023-04-06 | End: 2023-04-06

## 2023-04-06 RX ORDER — LIDOCAINE HYDROCHLORIDE 10 MG/ML
INJECTION, SOLUTION EPIDURAL; INFILTRATION; INTRACAUDAL; PERINEURAL AS NEEDED
Status: DISCONTINUED | OUTPATIENT
Start: 2023-04-06 | End: 2023-04-06 | Stop reason: SURG

## 2023-04-06 RX ORDER — NALOXONE HYDROCHLORIDE 0.4 MG/ML
80 INJECTION, SOLUTION INTRAMUSCULAR; INTRAVENOUS; SUBCUTANEOUS AS NEEDED
Status: DISCONTINUED | OUTPATIENT
Start: 2023-04-06 | End: 2023-04-06

## 2023-04-06 RX ADMIN — SODIUM CHLORIDE, SODIUM LACTATE, POTASSIUM CHLORIDE, CALCIUM CHLORIDE: 600; 310; 30; 20 INJECTION, SOLUTION INTRAVENOUS at 14:17:00

## 2023-04-06 RX ADMIN — LIDOCAINE HYDROCHLORIDE 30 MG: 10 INJECTION, SOLUTION EPIDURAL; INFILTRATION; INTRACAUDAL; PERINEURAL at 14:21:00

## 2023-04-06 RX ADMIN — SODIUM CHLORIDE, SODIUM LACTATE, POTASSIUM CHLORIDE, CALCIUM CHLORIDE: 600; 310; 30; 20 INJECTION, SOLUTION INTRAVENOUS at 14:43:00

## 2023-04-06 NOTE — ANESTHESIA POSTPROCEDURE EVALUATION
[address] Patient Status:  Hospital Outpatient Surgery   Age/Gender 41year old female MRN [id number]   Location [address] [address] Attending Cas Grimes MD   Hosp Day # 0 PCP Mallika Vogel DO       Anesthesia Post-op Note    COLONOSCOPY    Procedure Summary     Date: 04/06/23 Room / Location: [facility] ENDOSCOPY 04 / 1404 [facility] ENDOSCOPY    Anesthesia Start: 5035 Anesthesia Stop: 4398    Procedure: COLONOSCOPY Diagnosis:       Special screening for malignant neoplasms, colon      (Normal )    Surgeons: Cas Grimes MD Anesthesiologist: Robbin Hurd MD    Anesthesia Type: MAC ASA Status: 3          Anesthesia Type: MAC    Vitals Value Taken Time   /79 04/06/23 1443   Temp  04/06/23 1443   Pulse 73 04/06/23 1443   Resp 16 04/06/23 1443   SpO2 100 % 04/06/23 1443   Vitals shown include unvalidated device data.     Patient Location: Endoscopy    Anesthesia Type: MAC    Airway Patency: patent    Postop Pain Control: adequate    Mental Status: mildly sedated but able to meaningfully participate in the post-anesthesia evaluation    Nausea/Vomiting: none    Cardiopulmonary/Hydration status: stable euvolemic    Complications: no apparent anesthesia related complications    Postop vital signs: stable    Dental Exam: Unchanged from Postop

## 2023-04-06 NOTE — OPERATIVE REPORT
Operative Report-Colonoscopy    PREOPERATIVE DIAGNOSIS/INDICATION:  1. Average risk screening for colorectal cancer  POSTOPERTATIVE DIAGNOSIS:  1. Internal Hemorrhoids  2. Otherwise normal colonoscopy to the ileum  PROCEDURE PERFORMED: COLONOSCOPY  INFORMED CONSENT:  Once a brief history and physical examination was performed, the risks, benefits and alternatives to the procedure were discussed with the patient and/or family and informed consent was obtained. The risks of sedation, perforation, missed lesions and need for surgery were all discussed. Patient expressed understanding of the risks and agreed to proceed. PROCEDURE DESCRIPTION:The patient was then brought to the endoscopy suite where her pulse, pulse oximetry and blood pressure were monitored. She was placed in the left lateral decubitus position and deep sedation was administered. Once adequate sedation was achieved, a rectal exam was performed which was normal. A lubricated tip of an Olympus video colonoscope was then inserted through the rectum and gently manipulated under direct visualization to the cecal pole and the terminal ileum. The quality of the preparation was good. Upon withdrawal of the colonoscope, careful visualization of the mucosa was performed. Sparks Prep Score: Right Colon 3 Transverse colon 3 Left colon 3  FINDINGS/THERAPEUTICS:  - TERMINAL ILEUM:Normal to the extent examined  - COLON: Colon polyp size, number, location, removed via, diverticulosis  - RECTUM: Internal hemorrhoids  RECOMMENDATIONS:   - Post Colonoscopy/polypectomy precautions, watch for bleeding, infection, perforation, adverse drug reactions   - Repeat colonoscopy in 10 years.   CC Report:   Radha Denton MD  4/6/2023  2:42 PM  Jenny Santizo DO

## 2023-05-30 ENCOUNTER — OFFICE VISIT (OUTPATIENT)
Dept: FAMILY MEDICINE CLINIC | Facility: CLINIC | Age: 48
End: 2023-05-30
Payer: COMMERCIAL

## 2023-05-30 VITALS
RESPIRATION RATE: 18 BRPM | SYSTOLIC BLOOD PRESSURE: 122 MMHG | OXYGEN SATURATION: 97 % | BODY MASS INDEX: 46.56 KG/M2 | HEIGHT: 62 IN | HEART RATE: 91 BPM | DIASTOLIC BLOOD PRESSURE: 74 MMHG | WEIGHT: 253 LBS

## 2023-05-30 DIAGNOSIS — Z79.899 MEDICATION MANAGEMENT: ICD-10-CM

## 2023-05-30 DIAGNOSIS — Z00.00 LABORATORY EXAMINATION ORDERED AS PART OF A ROUTINE GENERAL MEDICAL EXAMINATION: ICD-10-CM

## 2023-05-30 DIAGNOSIS — D25.9 UTERINE LEIOMYOMA, UNSPECIFIED LOCATION: ICD-10-CM

## 2023-05-30 DIAGNOSIS — Z71.85 VACCINE COUNSELING: ICD-10-CM

## 2023-05-30 DIAGNOSIS — E55.9 VITAMIN D DEFICIENCY: ICD-10-CM

## 2023-05-30 DIAGNOSIS — Z13.89 SCREENING FOR GENITOURINARY CONDITION: ICD-10-CM

## 2023-05-30 DIAGNOSIS — R10.2 PELVIC PAIN: ICD-10-CM

## 2023-05-30 DIAGNOSIS — K21.9 GASTROESOPHAGEAL REFLUX DISEASE, UNSPECIFIED WHETHER ESOPHAGITIS PRESENT: ICD-10-CM

## 2023-05-30 DIAGNOSIS — M53.3 COCCYGEAL PAIN: ICD-10-CM

## 2023-05-30 DIAGNOSIS — R73.03 PREDIABETES: ICD-10-CM

## 2023-05-30 DIAGNOSIS — E66.01 MORBID OBESITY (HCC): ICD-10-CM

## 2023-05-30 DIAGNOSIS — G43.109 MIGRAINE WITH AURA AND WITHOUT STATUS MIGRAINOSUS, NOT INTRACTABLE: Primary | ICD-10-CM

## 2023-05-30 PROCEDURE — 99214 OFFICE O/P EST MOD 30 MIN: CPT | Performed by: FAMILY MEDICINE

## 2023-06-16 ENCOUNTER — HOSPITAL ENCOUNTER (OUTPATIENT)
Age: 48
Discharge: HOME | End: 2023-06-16
Payer: COMMERCIAL

## 2023-06-16 ENCOUNTER — APPOINTMENT (OUTPATIENT)
Dept: GENERAL RADIOLOGY | Age: 48
End: 2023-06-16
Attending: NURSE PRACTITIONER
Payer: COMMERCIAL

## 2023-06-16 VITALS
RESPIRATION RATE: 18 BRPM | TEMPERATURE: 98 F | WEIGHT: 250 LBS | SYSTOLIC BLOOD PRESSURE: 145 MMHG | HEART RATE: 71 BPM | BODY MASS INDEX: 46 KG/M2 | DIASTOLIC BLOOD PRESSURE: 95 MMHG | OXYGEN SATURATION: 100 %

## 2023-06-16 DIAGNOSIS — S90.31XA CONTUSION OF RIGHT FOOT, INITIAL ENCOUNTER: Primary | ICD-10-CM

## 2023-06-16 PROCEDURE — 99213 OFFICE O/P EST LOW 20 MIN: CPT

## 2023-06-16 PROCEDURE — 99214 OFFICE O/P EST MOD 30 MIN: CPT

## 2023-06-16 PROCEDURE — 73630 X-RAY EXAM OF FOOT: CPT | Performed by: NURSE PRACTITIONER

## 2023-06-17 NOTE — DISCHARGE INSTRUCTIONS
Tylenol for pain as needed  Wear post-op shoe when walking until pain improves  Follow up with primary care provider as needed

## 2023-07-01 ENCOUNTER — HOSPITAL ENCOUNTER (OUTPATIENT)
Dept: GENERAL RADIOLOGY | Age: 48
Discharge: HOME | End: 2023-07-01
Attending: FAMILY MEDICINE
Payer: COMMERCIAL

## 2023-07-01 DIAGNOSIS — M53.3 COCCYGEAL PAIN: ICD-10-CM

## 2023-07-01 PROCEDURE — 72220 X-RAY EXAM SACRUM TAILBONE: CPT | Performed by: FAMILY MEDICINE

## 2023-07-10 ENCOUNTER — OFFICE VISIT (OUTPATIENT)
Dept: FAMILY MEDICINE CLINIC | Facility: CLINIC | Age: 48
End: 2023-07-10
Payer: COMMERCIAL

## 2023-07-10 VITALS
OXYGEN SATURATION: 99 % | HEIGHT: 62 IN | SYSTOLIC BLOOD PRESSURE: 120 MMHG | BODY MASS INDEX: 47.84 KG/M2 | DIASTOLIC BLOOD PRESSURE: 74 MMHG | RESPIRATION RATE: 18 BRPM | HEART RATE: 79 BPM | WEIGHT: 260 LBS

## 2023-07-10 DIAGNOSIS — G89.29 CHRONIC PAIN OF LEFT KNEE: Primary | ICD-10-CM

## 2023-07-10 DIAGNOSIS — M54.50 CHRONIC BILATERAL LOW BACK PAIN WITHOUT SCIATICA: ICD-10-CM

## 2023-07-10 DIAGNOSIS — M79.605 LEFT LEG PAIN: ICD-10-CM

## 2023-07-10 DIAGNOSIS — M25.562 CHRONIC PAIN OF LEFT KNEE: Primary | ICD-10-CM

## 2023-07-10 DIAGNOSIS — G89.29 CHRONIC BILATERAL LOW BACK PAIN WITHOUT SCIATICA: ICD-10-CM

## 2023-07-10 PROCEDURE — 3078F DIAST BP <80 MM HG: CPT | Performed by: FAMILY MEDICINE

## 2023-07-10 PROCEDURE — 99213 OFFICE O/P EST LOW 20 MIN: CPT | Performed by: FAMILY MEDICINE

## 2023-07-10 PROCEDURE — 3008F BODY MASS INDEX DOCD: CPT | Performed by: FAMILY MEDICINE

## 2023-07-10 PROCEDURE — 3074F SYST BP LT 130 MM HG: CPT | Performed by: FAMILY MEDICINE

## 2023-07-10 RX ORDER — IBUPROFEN 600 MG/1
600 TABLET ORAL EVERY 8 HOURS PRN
Qty: 90 TABLET | Refills: 1 | Status: SHIPPED | OUTPATIENT
Start: 2023-07-10

## 2023-08-08 ENCOUNTER — PATIENT MESSAGE (OUTPATIENT)
Dept: FAMILY MEDICINE CLINIC | Facility: CLINIC | Age: 48
End: 2023-08-08

## 2023-08-08 DIAGNOSIS — M25.562 PAIN IN BOTH KNEES, UNSPECIFIED CHRONICITY: Primary | ICD-10-CM

## 2023-08-08 DIAGNOSIS — M25.561 PAIN IN BOTH KNEES, UNSPECIFIED CHRONICITY: Primary | ICD-10-CM

## 2023-08-08 NOTE — TELEPHONE ENCOUNTER
LOV 7/10/23     Deferred imaging at this time. We will start with physical therapy. Advised ibuprofen 600 mg 3 times daily for 5 to 7 days. Discussed the risk of GI bleed. Advised take with food. Advised to wean down on ibuprofen and use as needed after that. Monitor symptoms.      PT pended   Thank you

## 2023-08-08 NOTE — TELEPHONE ENCOUNTER
From: Gwen Spears  To: Aletha Tellez DO  Sent: 8/8/2023 4:09 PM CDT  Subject: Knee pain    Hi Dr. Maria Antonia Saez,    I have been having knee pain at the top of both knees for about three weeks now after working out. I was doing leg exercises with a weight machine. The left knee is worse, and it's the same pain I mentioned to you at my last visit. I wanted to ask for an MRI to rule out any serious issues. The pain is when bending and it's the lower thigh/upper the knee. The pain does get better after taking Tylenol, I have also been taking ibuprofen to help with possible inflammation. Please let me know what you think!     Thanks,   [employer]

## 2023-08-25 NOTE — ED INITIAL ASSESSMENT (HPI)
Patient with hx/o palpitations on metoprolol 25mg. Patient feels she has had an increase incident of HR 88-90 but as high as 113 lasting only a few seconds, Some days no episodes. Last 5 days B/P has bee elevated 165/110 yesterday.
no

## 2023-08-31 DIAGNOSIS — K21.9 GASTROESOPHAGEAL REFLUX DISEASE, UNSPECIFIED WHETHER ESOPHAGITIS PRESENT: ICD-10-CM

## 2023-08-31 RX ORDER — OMEPRAZOLE 40 MG/1
CAPSULE, DELAYED RELEASE ORAL
Qty: 90 CAPSULE | Refills: 1 | Status: SHIPPED | OUTPATIENT
Start: 2023-08-31

## 2023-09-01 RX ORDER — ERGOCALCIFEROL 1.25 MG/1
CAPSULE ORAL
Qty: 24 CAPSULE | Refills: 0 | OUTPATIENT
Start: 2023-09-01

## 2023-10-24 ENCOUNTER — OFFICE VISIT (OUTPATIENT)
Dept: FAMILY MEDICINE CLINIC | Facility: CLINIC | Age: 48
End: 2023-10-24

## 2023-10-24 VITALS
DIASTOLIC BLOOD PRESSURE: 80 MMHG | OXYGEN SATURATION: 99 % | RESPIRATION RATE: 16 BRPM | HEART RATE: 85 BPM | SYSTOLIC BLOOD PRESSURE: 120 MMHG | HEIGHT: 62 IN | WEIGHT: 259 LBS | BODY MASS INDEX: 47.66 KG/M2

## 2023-10-24 DIAGNOSIS — G43.109 MIGRAINE WITH AURA AND WITHOUT STATUS MIGRAINOSUS, NOT INTRACTABLE: ICD-10-CM

## 2023-10-24 DIAGNOSIS — Z00.00 ROUTINE GENERAL MEDICAL EXAMINATION AT A HEALTH CARE FACILITY: Primary | ICD-10-CM

## 2023-10-24 DIAGNOSIS — Z12.31 SCREENING MAMMOGRAM FOR BREAST CANCER: ICD-10-CM

## 2023-10-24 DIAGNOSIS — E55.9 VITAMIN D DEFICIENCY: ICD-10-CM

## 2023-10-24 DIAGNOSIS — R73.03 PREDIABETES: ICD-10-CM

## 2023-10-24 DIAGNOSIS — E66.01 MORBID OBESITY (HCC): ICD-10-CM

## 2023-10-24 DIAGNOSIS — Z13.89 SCREENING FOR GENITOURINARY CONDITION: ICD-10-CM

## 2023-10-24 DIAGNOSIS — Z00.00 LABORATORY EXAMINATION ORDERED AS PART OF A ROUTINE GENERAL MEDICAL EXAMINATION: ICD-10-CM

## 2023-10-24 DIAGNOSIS — Z12.4 SCREENING FOR CERVICAL CANCER: ICD-10-CM

## 2023-10-24 LAB
ALBUMIN SERPL-MCNC: 3.7 G/DL (ref 3.4–5)
ALBUMIN/GLOB SERPL: 1 {RATIO} (ref 1–2)
ALP LIVER SERPL-CCNC: 123 U/L
ALT SERPL-CCNC: 20 U/L
ANION GAP SERPL CALC-SCNC: 5 MMOL/L (ref 0–18)
AST SERPL-CCNC: 7 U/L (ref 15–37)
BASOPHILS # BLD AUTO: 0.04 X10(3) UL (ref 0–0.2)
BASOPHILS NFR BLD AUTO: 0.6 %
BILIRUB SERPL-MCNC: 0.6 MG/DL (ref 0.1–2)
BILIRUB UR QL STRIP.AUTO: NEGATIVE
BUN BLD-MCNC: 14 MG/DL (ref 7–18)
CALCIUM BLD-MCNC: 9.1 MG/DL (ref 8.5–10.1)
CHLORIDE SERPL-SCNC: 109 MMOL/L (ref 98–112)
CHOLEST SERPL-MCNC: 187 MG/DL (ref ?–200)
CLARITY UR REFRACT.AUTO: CLEAR
CO2 SERPL-SCNC: 27 MMOL/L (ref 21–32)
CREAT BLD-MCNC: 0.92 MG/DL
CREAT UR-SCNC: 176 MG/DL
EGFRCR SERPLBLD CKD-EPI 2021: 78 ML/MIN/1.73M2 (ref 60–?)
EOSINOPHIL # BLD AUTO: 0.19 X10(3) UL (ref 0–0.7)
EOSINOPHIL NFR BLD AUTO: 2.9 %
ERYTHROCYTE [DISTWIDTH] IN BLOOD BY AUTOMATED COUNT: 15.7 %
EST. AVERAGE GLUCOSE BLD GHB EST-MCNC: 128 MG/DL (ref 68–126)
FASTING PATIENT LIPID ANSWER: YES
FASTING STATUS PATIENT QL REPORTED: YES
GLOBULIN PLAS-MCNC: 3.6 G/DL (ref 2.8–4.4)
GLUCOSE BLD-MCNC: 98 MG/DL (ref 70–99)
GLUCOSE UR STRIP.AUTO-MCNC: NORMAL MG/DL
HBA1C MFR BLD: 6.1 % (ref ?–5.7)
HCT VFR BLD AUTO: 39.9 %
HDLC SERPL-MCNC: 57 MG/DL (ref 40–59)
HGB BLD-MCNC: 12 G/DL
IMM GRANULOCYTES # BLD AUTO: 0.02 X10(3) UL (ref 0–1)
IMM GRANULOCYTES NFR BLD: 0.3 %
KETONES UR STRIP.AUTO-MCNC: NEGATIVE MG/DL
LDLC SERPL CALC-MCNC: 118 MG/DL (ref ?–100)
LEUKOCYTE ESTERASE UR QL STRIP.AUTO: NEGATIVE
LYMPHOCYTES # BLD AUTO: 2.09 X10(3) UL (ref 1–4)
LYMPHOCYTES NFR BLD AUTO: 32.2 %
MCH RBC QN AUTO: 26.2 PG (ref 26–34)
MCHC RBC AUTO-ENTMCNC: 30.1 G/DL (ref 31–37)
MCV RBC AUTO: 87.1 FL
MICROALBUMIN UR-MCNC: 0.73 MG/DL
MICROALBUMIN/CREAT 24H UR-RTO: 4.1 UG/MG (ref ?–30)
MONOCYTES # BLD AUTO: 0.46 X10(3) UL (ref 0.1–1)
MONOCYTES NFR BLD AUTO: 7.1 %
NEUTROPHILS # BLD AUTO: 3.7 X10 (3) UL (ref 1.5–7.7)
NEUTROPHILS # BLD AUTO: 3.7 X10(3) UL (ref 1.5–7.7)
NEUTROPHILS NFR BLD AUTO: 56.9 %
NITRITE UR QL STRIP.AUTO: NEGATIVE
NONHDLC SERPL-MCNC: 130 MG/DL (ref ?–130)
OSMOLALITY SERPL CALC.SUM OF ELEC: 292 MOSM/KG (ref 275–295)
PH UR STRIP.AUTO: 6.5 [PH] (ref 5–8)
PLATELET # BLD AUTO: 304 10(3)UL (ref 150–450)
POTASSIUM SERPL-SCNC: 4.1 MMOL/L (ref 3.5–5.1)
PROT SERPL-MCNC: 7.3 G/DL (ref 6.4–8.2)
PROT UR STRIP.AUTO-MCNC: NEGATIVE MG/DL
RBC # BLD AUTO: 4.58 X10(6)UL
RBC UR QL AUTO: NEGATIVE
SODIUM SERPL-SCNC: 141 MMOL/L (ref 136–145)
SP GR UR STRIP.AUTO: 1.02 (ref 1–1.03)
TRIGL SERPL-MCNC: 65 MG/DL (ref 30–149)
TSI SER-ACNC: 1.74 MIU/ML (ref 0.36–3.74)
UROBILINOGEN UR STRIP.AUTO-MCNC: NORMAL MG/DL
VIT D+METAB SERPL-MCNC: 22 NG/ML (ref 30–100)
VLDLC SERPL CALC-MCNC: 11 MG/DL (ref 0–30)
WBC # BLD AUTO: 6.5 X10(3) UL (ref 4–11)

## 2023-10-24 PROCEDURE — 82570 ASSAY OF URINE CREATININE: CPT | Performed by: FAMILY MEDICINE

## 2023-10-24 PROCEDURE — 87624 HPV HI-RISK TYP POOLED RSLT: CPT | Performed by: FAMILY MEDICINE

## 2023-10-24 PROCEDURE — 3079F DIAST BP 80-89 MM HG: CPT | Performed by: FAMILY MEDICINE

## 2023-10-24 PROCEDURE — 80053 COMPREHEN METABOLIC PANEL: CPT | Performed by: FAMILY MEDICINE

## 2023-10-24 PROCEDURE — 80061 LIPID PANEL: CPT | Performed by: FAMILY MEDICINE

## 2023-10-24 PROCEDURE — 3008F BODY MASS INDEX DOCD: CPT | Performed by: FAMILY MEDICINE

## 2023-10-24 PROCEDURE — 88175 CYTOPATH C/V AUTO FLUID REDO: CPT | Performed by: FAMILY MEDICINE

## 2023-10-24 PROCEDURE — 82306 VITAMIN D 25 HYDROXY: CPT | Performed by: FAMILY MEDICINE

## 2023-10-24 PROCEDURE — 3074F SYST BP LT 130 MM HG: CPT | Performed by: FAMILY MEDICINE

## 2023-10-24 PROCEDURE — 85025 COMPLETE CBC W/AUTO DIFF WBC: CPT | Performed by: FAMILY MEDICINE

## 2023-10-24 PROCEDURE — 84443 ASSAY THYROID STIM HORMONE: CPT | Performed by: FAMILY MEDICINE

## 2023-10-24 PROCEDURE — 81003 URINALYSIS AUTO W/O SCOPE: CPT | Performed by: FAMILY MEDICINE

## 2023-10-24 PROCEDURE — 99396 PREV VISIT EST AGE 40-64: CPT | Performed by: FAMILY MEDICINE

## 2023-10-24 PROCEDURE — 82043 UR ALBUMIN QUANTITATIVE: CPT | Performed by: FAMILY MEDICINE

## 2023-10-24 PROCEDURE — 83036 HEMOGLOBIN GLYCOSYLATED A1C: CPT | Performed by: FAMILY MEDICINE

## 2023-10-24 RX ORDER — TOPIRAMATE 50 MG/1
50 TABLET, FILM COATED ORAL NIGHTLY
Qty: 90 TABLET | Refills: 1 | Status: SHIPPED | OUTPATIENT
Start: 2023-10-24

## 2023-10-29 LAB
.: NORMAL
.: NORMAL

## 2023-10-30 LAB — HPV I/H RISK 1 DNA SPEC QL NAA+PROBE: NEGATIVE

## 2023-11-29 DIAGNOSIS — E66.01 MORBID OBESITY (HCC): ICD-10-CM

## 2023-11-29 DIAGNOSIS — G43.109 MIGRAINE WITH AURA AND WITHOUT STATUS MIGRAINOSUS, NOT INTRACTABLE: ICD-10-CM

## 2023-11-29 RX ORDER — TOPIRAMATE 50 MG/1
50 TABLET, FILM COATED ORAL NIGHTLY
Qty: 90 TABLET | Refills: 1 | OUTPATIENT
Start: 2023-11-29

## 2023-11-30 ENCOUNTER — OFFICE VISIT (OUTPATIENT)
Dept: FAMILY MEDICINE CLINIC | Facility: CLINIC | Age: 48
End: 2023-11-30
Payer: COMMERCIAL

## 2023-11-30 VITALS
HEIGHT: 62 IN | BODY MASS INDEX: 47.66 KG/M2 | WEIGHT: 259 LBS | SYSTOLIC BLOOD PRESSURE: 120 MMHG | RESPIRATION RATE: 16 BRPM | DIASTOLIC BLOOD PRESSURE: 80 MMHG | HEART RATE: 90 BPM | OXYGEN SATURATION: 99 %

## 2023-11-30 DIAGNOSIS — Z71.85 VACCINE COUNSELING: ICD-10-CM

## 2023-11-30 DIAGNOSIS — Z79.899 MEDICATION MANAGEMENT: ICD-10-CM

## 2023-11-30 DIAGNOSIS — M25.562 CHRONIC PAIN OF LEFT KNEE: Primary | ICD-10-CM

## 2023-11-30 DIAGNOSIS — G89.29 CHRONIC PAIN OF LEFT KNEE: Primary | ICD-10-CM

## 2023-11-30 DIAGNOSIS — M53.3 COCCYGEAL PAIN, CHRONIC: ICD-10-CM

## 2023-11-30 DIAGNOSIS — G89.29 COCCYGEAL PAIN, CHRONIC: ICD-10-CM

## 2023-11-30 PROCEDURE — 3008F BODY MASS INDEX DOCD: CPT | Performed by: FAMILY MEDICINE

## 2023-11-30 PROCEDURE — 3079F DIAST BP 80-89 MM HG: CPT | Performed by: FAMILY MEDICINE

## 2023-11-30 PROCEDURE — 3074F SYST BP LT 130 MM HG: CPT | Performed by: FAMILY MEDICINE

## 2023-11-30 PROCEDURE — 99214 OFFICE O/P EST MOD 30 MIN: CPT | Performed by: FAMILY MEDICINE

## 2023-11-30 RX ORDER — IBUPROFEN 600 MG/1
600 TABLET ORAL EVERY 6 HOURS PRN
COMMUNITY
End: 2023-11-30

## 2023-11-30 RX ORDER — IBUPROFEN 600 MG/1
600 TABLET ORAL EVERY 8 HOURS PRN
Qty: 90 TABLET | Refills: 1 | Status: SHIPPED | OUTPATIENT
Start: 2023-11-30

## 2023-12-13 ENCOUNTER — HOSPITAL ENCOUNTER (OUTPATIENT)
Age: 48
Discharge: HOME | End: 2023-12-13
Payer: COMMERCIAL

## 2023-12-13 VITALS
DIASTOLIC BLOOD PRESSURE: 99 MMHG | WEIGHT: 260 LBS | HEART RATE: 76 BPM | TEMPERATURE: 98 F | SYSTOLIC BLOOD PRESSURE: 162 MMHG | OXYGEN SATURATION: 100 % | RESPIRATION RATE: 16 BRPM | HEIGHT: 62 IN | BODY MASS INDEX: 47.84 KG/M2

## 2023-12-13 DIAGNOSIS — J06.9 VIRAL URI WITH COUGH: Primary | ICD-10-CM

## 2023-12-13 DIAGNOSIS — R03.0 ELEVATED BLOOD PRESSURE READING: ICD-10-CM

## 2023-12-13 PROCEDURE — 99213 OFFICE O/P EST LOW 20 MIN: CPT

## 2023-12-13 PROCEDURE — 87502 INFLUENZA DNA AMP PROBE: CPT | Performed by: NURSE PRACTITIONER

## 2023-12-13 NOTE — DISCHARGE INSTRUCTIONS
Your blood pressure was elevated.  Monitor and follow up with your care team    Upper respiratory infection supportive care measures to try as applicable:  General:   - Wash hands often   - Disinfect your environment, linens, electronics, etc.   - Drink plenty of fluids (water, Pedialyte, etc.)   - Get plenty of rest and sleep with head elevated to help with sinus drainage and throat irritation   - Avoid having air blow on your face as this can worsen congestion / cough   - Do not share utensils or drinks   - Alternate Ibuprofen (adult: 600mg) and Tylenol (adult: 650-1000mg) as needed for pain / body aches / fever   - Symptoms may take a few weeks to resolve   - You may benefit from taking a daily multivitamin   - You may benefit from Zinc (~20mg) a couple times a week   - You may benefit from Vitamin D daily (~2000u)   - Change toothbrush    Sore throat:   - Salt water gargles throughout the day for sore throat   - Cepacol lozenges as needed for sore throat    Cough / Sinus:   - You may benefit from spoonfuls (and/or added to warm drinks) of honey throughout the day for cough   - You may benefit from taking a decongestant (e.g. Sudafed - pseudoephedrine [behind the pharmacy counter]) (may temporarily elevate your heart rate and blood pressure)   - You may benefit from using a humidifier and/or steam showers   - You may benefit from Flonase nasal spray daily   - You may benefit from taking a daily allergy medication (e.g. Zyrtec, Xyzal, etc.)   - You may benefit from boiling water with lemon and cayenne pepper, then breathing in the steam (you can cover your head with a towel to help funnel the steam)

## 2024-01-17 ENCOUNTER — TELEPHONE (OUTPATIENT)
Dept: ADMINISTRATIVE | Age: 49
End: 2024-01-17

## 2024-01-17 DIAGNOSIS — G89.29 CHRONIC PAIN OF LEFT KNEE: Primary | ICD-10-CM

## 2024-01-17 DIAGNOSIS — M25.562 CHRONIC PAIN OF LEFT KNEE: Primary | ICD-10-CM

## 2024-01-17 NOTE — TELEPHONE ENCOUNTER
MRI KNEE, LEFT (SPR=[id number])       Referral #: [id number]     Scheduled For: 01/22/2024    Status: DENIED > If the ordering provider would like to discuss this case with a reviewer, contact [facility] at [phone].    The reason is,       Use Reference Case Number: [id number]     Notified clinical staff for follow-up, a copy of the denial letter is filed under the MEDIA tab, un-check \" Clinical Info Only \" to view the determination letter for denial rational and appeal process.     Patient notified of adverse determination via [employer].     Appt Desk > Noted

## 2024-02-18 NOTE — PROGRESS NOTES
Yelitza Acevedo is a 48 year old female.  HPI:   Pt. Is here for med check.  Her BP is high.  BP the other day 127/80.  Migraines- on topamax; have been mild  Prediabetes -- off metformin  Obese -- due to see weight loss clinic  GERD -- stable on omeprazole as needed  Vitamin D def -- taking weekly   Left knee pain -- doing ok but bother her this past week due to walking  PAF/PAF -- stable  Would like to check her hormones -- has not had a period in 1 year in 3/2024.    Meds reviewed.    JACKIE NOVOA OR, Take by mouth daily., Disp: , Rfl:   NON FORMULARY, daily. Soursat, Disp: , Rfl:   NON FORMULARY, daily. moranga, Disp: , Rfl:   NON FORMULARY, daily. Sea chavez, Disp: , Rfl:   NON FORMULARY, daily. vilberry, Disp: , Rfl:   ergocalciferol 1.25 MG (70032 UT) Oral Cap, Take 1 capsule (50,000 Units total) by mouth once a week. With food for 6 months total.  Recheck lab in july, Disp: 24 capsule, Rfl: 0  metFORMIN  MG Oral Tablet 24 Hr, Take 2 tablets (1,500 mg total) by mouth daily with food., Disp: 60 tablet, Rfl: 2  topiramate 50 MG Oral Tab, Take 1 tablet (50 mg total) by mouth nightly., Disp: 90 tablet, Rfl: 1  PEG 3350-KCl-Na Bicarb-NaCl 420 g Oral Recon Soln, Take as directed by physician, Disp: 4000 mL, Rfl: 0  OMEPRAZOLE 40 MG Oral Capsule Delayed Release, TAKE 1 CAPSULE(40 MG) BY MOUTH DAILY, Disp: 90 capsule, Rfl: 1  naproxen 500 MG Oral Tab, Take 1 tablet (500 mg total) by mouth 2 (two) times daily as needed., Disp: 20 tablet, Rfl: 0  metoprolol succinate ER 25 MG Oral Tablet 24 Hr, Take 1 tablet (25 mg total) by mouth daily., Disp: , Rfl:   ibuprofen 200 MG Oral Tab, Take 1 tablet (200 mg total) by mouth every 6 (six) hours as needed for Pain., Disp: , Rfl:   acetaminophen 500 MG Oral Tab, Take 2 tablets (1,000 mg total) by mouth every 6 (six) hours as needed for Pain., Disp: , Rfl:   Vitamin D3 50 MCG (2000 UT) Oral Cap, Take 1 capsule (2,000 Units total) by mouth daily., Disp: , Rfl:    Fluticasone Propionate 50 MCG/ACT Nasal Suspension, 2 sprays by Nasal route daily. (Patient taking differently: 2 sprays by Nasal route as needed.), Disp: 16 g, Rfl: 0    No current facility-administered medications for this visit.     No Known Allergies   Past Medical History:   Diagnosis Date    Arrhythmia     Atrial tachycardia 10/23/2019    Bilateral carpal tunnel syndrome 2005    BPPV (benign paroxysmal positional vertigo) 06/29/2012    Chronic hypertension     Edema 06/29/2012    Environmental allergies 04/16/2014    Fatigue 06/29/2012    Fibroids 06/29/2012    Ganglion of tendon sheath     GERD (gastroesophageal reflux disease)     Herpes simplex virus (HSV) infection 06/29/2012    Low back - had some itching & a rash. Recurred in same area once. No issue for awhile.     History of pelvic ultrasound 02/16/2022 2/16/22 Pelvic US - per PCP EMS 6 mm. Lfet fundal fibroid 2.8 cm (previously 3.2 cm), right anterior fibroid 1.7 cm (previously 1.8 cm), ovaries normal. Trace free fluid in cul de sac.     Insulin resistance 06/29/2012    Iron deficiency anemia secondary to inadequate dietary iron intake 06/29/2012    Migraine without aura     just photophobia    Migraines     Morbid obesity with BMI of 45.0-49.9, adult (HCC) 06/2012    PAC (premature atrial contraction) 10/19/2018    PAF (paroxysmal atrial fibrillation) (HCC) 10/19/2018    Pain in joint, lower leg 06/29/2012    Palpitations     Pap smear for cervical cancer screening 10/25/2021    Pap & HPV negative.     Personal history of urinary calculi     Prediabetes 04/2019    PVC (premature ventricular contraction) 10/19/2018    Screening mammogram for breast cancer 12/03/2021    Negative    Shortness of breath 06/29/2012    Sleep apnea     Unspecified sinusitis (chronic)     Visual impairment     glasses    Vitamin D deficiency 10/2011    Consistently since 2011    Wears glasses     Weight gain 06/17/2021      Social History:  Social History      Socioeconomic History    Marital status:    Tobacco Use    Smoking status: Never     Passive exposure: Never    Smokeless tobacco: Never   Vaping Use    Vaping Use: Never used   Substance and Sexual Activity    Alcohol use: Yes     Alcohol/week: 0.0 standard drinks of alcohol     Comment: socially    Drug use: No    Sexual activity: Yes     Partners: Male     Birth control/protection: Vasectomy   Other Topics Concern    Caffeine Concern Yes     Comment: occ pop    Exercise No        Results for orders placed or performed during the hospital encounter of 12/13/23   Rapid SARS-CoV-2 by PCR    Specimen: Nares; Other   Result Value Ref Range    Rapid SARS-CoV-2 by PCR Not Detected Not Detected   POCT Flu Test    Specimen: Nares; Other   Result Value Ref Range    POCT INFLUENZA A Negative Negative    POCT INFLUENZA B Negative Negative       REVIEW OF SYSTEMS:   GENERAL: feels well otherwise  SKIN: denies any unusual skin lesions  LUNGS: denies shortness of breath with exertion  CARDIOVASCULAR: denies chest pain on exertion  GI: denies abdominal pain,denies heartburn  MUSCULOSKELETAL: denies back pain; pelvic pain  EXTREMITIES:  No pain or numbness    EXAM:   BP (!) 176/92   Pulse 62   Resp 16   Ht 5' 2\" (1.575 m)   Wt 265 lb (120.2 kg)   LMP 03/06/2023 (Exact Date)   SpO2 98%   BMI 48.47 kg/m²   GENERAL: well developed, well nourished,in no apparent distress  SKIN: no rashes,no suspicious lesions  HEENT: atraumatic, normocephalic  NECK: supple,no adenopathy,no bruits  LUNGS: clear to auscultation  CARDIO: RRR without murmur  GI: soft, good BS's  EXTREMITIES: no cyanosis, clubbing; tr. LE edema; tender over her coccyx  Bilateral barefoot skin diabetic exam is normal, visualized feet and the appearance is normal.  Bilateral monofilament/sensation of both feet is normal.  Pulsation pedal pulse exam of both lower legs/feet is normal as well.      ASSESSMENT AND PLAN:     Encounter Diagnoses   Name Primary?     Prediabetes Yes    Chronic pain of left knee     Migraine with aura and without status migrainosus, not intractable     Morbid obesity (HCC)     Gastroesophageal reflux disease, unspecified whether esophagitis present     PAC (premature atrial contraction)     Mixed hyperlipidemia     PAF (paroxysmal atrial fibrillation) (HCC)     Vitamin D deficiency     Medication management     Vaccine counseling     Amenorrhea     Essential hypertension     Lipedema of lower extremity        Orders Placed This Encounter   Procedures    Comp Metabolic Panel (14) [E]    Lipid Panel [E]    Hemoglobin A1C [E]    Vitamin D, 25-Hydroxy    Microalb/Creat Ratio, Random Urine    FSH AND LH [7137] [Q]    Estradiol [E]       Meds & Refills for this Visit:  Requested Prescriptions     Signed Prescriptions Disp Refills    amLODIPine 5 MG Oral Tab 53 tablet 1     Sig: Take 1 tablet (5 mg total) by mouth daily for 7 days, THEN 2 tablets (10 mg total) daily for 23 days.    metoprolol succinate ER 50 MG Oral Tablet 24 Hr 90 tablet 0     Sig: Take 1 tablet (50 mg total) by mouth daily.       Imaging & Consults:  None     Migraines- on topamax; have been mild  Prediabetes -- off metformin  Obese -- will see weight loss clinic; would consider weight loss surgery and spoke about lipedema  GERD -- stable on omeprazole prn  Vitamin D def -- taking weekly  Left knee pain -- doing well  Amenorrhea -- check labs  HTN -- increase metoprolol ER  from 25 mg to 50 mg daily and start amlodipine 5-10 mg daily if needed; monitor  Low salt diet.  Vaccines UTD  Given In sight -- insurance is telling her [facility] is not in network.        The patient indicates understanding of these issues and agrees to the plan.  Return in about 6 weeks (around 4/1/2024) for HTN check, med check.

## 2024-02-19 ENCOUNTER — OFFICE VISIT (OUTPATIENT)
Dept: FAMILY MEDICINE CLINIC | Facility: CLINIC | Age: 49
End: 2024-02-19
Payer: COMMERCIAL

## 2024-02-19 VITALS
HEART RATE: 62 BPM | SYSTOLIC BLOOD PRESSURE: 162 MMHG | RESPIRATION RATE: 16 BRPM | BODY MASS INDEX: 48.76 KG/M2 | WEIGHT: 265 LBS | DIASTOLIC BLOOD PRESSURE: 84 MMHG | OXYGEN SATURATION: 98 % | HEIGHT: 62 IN

## 2024-02-19 DIAGNOSIS — N91.2 AMENORRHEA: ICD-10-CM

## 2024-02-19 DIAGNOSIS — R60.0 LIPEDEMA OF LOWER EXTREMITY: ICD-10-CM

## 2024-02-19 DIAGNOSIS — I48.0 PAF (PAROXYSMAL ATRIAL FIBRILLATION) (HCC): ICD-10-CM

## 2024-02-19 DIAGNOSIS — K21.9 GASTROESOPHAGEAL REFLUX DISEASE, UNSPECIFIED WHETHER ESOPHAGITIS PRESENT: ICD-10-CM

## 2024-02-19 DIAGNOSIS — G43.109 MIGRAINE WITH AURA AND WITHOUT STATUS MIGRAINOSUS, NOT INTRACTABLE: ICD-10-CM

## 2024-02-19 DIAGNOSIS — Z79.899 MEDICATION MANAGEMENT: ICD-10-CM

## 2024-02-19 DIAGNOSIS — E66.01 MORBID OBESITY (HCC): ICD-10-CM

## 2024-02-19 DIAGNOSIS — Z71.85 VACCINE COUNSELING: ICD-10-CM

## 2024-02-19 DIAGNOSIS — R73.03 PREDIABETES: Primary | ICD-10-CM

## 2024-02-19 DIAGNOSIS — I49.1 PAC (PREMATURE ATRIAL CONTRACTION): ICD-10-CM

## 2024-02-19 DIAGNOSIS — I10 ESSENTIAL HYPERTENSION: ICD-10-CM

## 2024-02-19 DIAGNOSIS — E55.9 VITAMIN D DEFICIENCY: ICD-10-CM

## 2024-02-19 DIAGNOSIS — E78.2 MIXED HYPERLIPIDEMIA: ICD-10-CM

## 2024-02-19 DIAGNOSIS — G89.29 CHRONIC PAIN OF LEFT KNEE: ICD-10-CM

## 2024-02-19 DIAGNOSIS — M25.562 CHRONIC PAIN OF LEFT KNEE: ICD-10-CM

## 2024-02-19 PROCEDURE — 99214 OFFICE O/P EST MOD 30 MIN: CPT | Performed by: FAMILY MEDICINE

## 2024-02-19 RX ORDER — METOPROLOL SUCCINATE 50 MG/1
50 TABLET, EXTENDED RELEASE ORAL DAILY
Qty: 90 TABLET | Refills: 0 | Status: SHIPPED | OUTPATIENT
Start: 2024-02-19

## 2024-02-19 RX ORDER — AMLODIPINE BESYLATE 5 MG/1
TABLET ORAL
Qty: 53 TABLET | Refills: 1 | Status: SHIPPED | OUTPATIENT
Start: 2024-02-19 | End: 2024-03-20

## 2024-02-19 RX ORDER — AMLODIPINE BESYLATE 5 MG/1
TABLET ORAL
Qty: 159 TABLET | Refills: 0 | OUTPATIENT
Start: 2024-02-19

## 2024-02-21 ENCOUNTER — HOSPITAL ENCOUNTER (EMERGENCY)
Facility: HOSPITAL | Age: 49
Discharge: LEFT BEFORE DISCHARGE | End: 2024-02-21
Payer: COMMERCIAL

## 2024-02-25 ENCOUNTER — PATIENT MESSAGE (OUTPATIENT)
Dept: FAMILY MEDICINE CLINIC | Facility: CLINIC | Age: 49
End: 2024-02-25

## 2024-02-26 NOTE — TELEPHONE ENCOUNTER
From: Yelitza Acevedo  To: Jenny Santizo  Sent: 2/25/2024 3:31 PM CST  Subject: Immunizations needed to travel to [place]    Hi Stevie Barragan and I are planning on traveling to [place] in December. Are there any shots that we will need? I am seeing something about a yellow fever shot. If so, is there a time frame that we would need to have them? Thanks!

## 2024-02-29 ENCOUNTER — HOSPITAL ENCOUNTER (OUTPATIENT)
Dept: MAMMOGRAPHY | Age: 49
Discharge: HOME | End: 2024-02-29
Attending: FAMILY MEDICINE
Payer: COMMERCIAL

## 2024-02-29 DIAGNOSIS — Z12.31 SCREENING MAMMOGRAM FOR BREAST CANCER: ICD-10-CM

## 2024-02-29 PROCEDURE — 77063 BREAST TOMOSYNTHESIS BI: CPT | Performed by: FAMILY MEDICINE

## 2024-02-29 PROCEDURE — 77067 SCR MAMMO BI INCL CAD: CPT | Performed by: FAMILY MEDICINE

## 2024-03-14 ENCOUNTER — PATIENT MESSAGE (OUTPATIENT)
Dept: FAMILY MEDICINE CLINIC | Facility: CLINIC | Age: 49
End: 2024-03-14

## 2024-03-14 NOTE — TELEPHONE ENCOUNTER
From: Yelitza Acevedo  To: Jenny Santizo  Sent: 3/14/2024 10:10 AM CDT  Subject: MRI    Hi Dr. Santizo,    I am checking to on the results of my MRI. I called the place where I had the test, and they said the results were sent over in the 5th of this month.    Thanks!

## 2024-03-18 ENCOUNTER — HOSPITAL ENCOUNTER (EMERGENCY)
Facility: HOSPITAL | Age: 49
Discharge: HOME | End: 2024-03-19
Attending: EMERGENCY MEDICINE
Payer: COMMERCIAL

## 2024-03-18 ENCOUNTER — HOSPITAL ENCOUNTER (OUTPATIENT)
Age: 49
Discharge: ANOTHER HOSPITAL | End: 2024-03-18
Attending: EMERGENCY MEDICINE
Payer: COMMERCIAL

## 2024-03-18 ENCOUNTER — APPOINTMENT (OUTPATIENT)
Dept: GENERAL RADIOLOGY | Facility: HOSPITAL | Age: 49
End: 2024-03-18
Payer: COMMERCIAL

## 2024-03-18 ENCOUNTER — APPOINTMENT (OUTPATIENT)
Dept: CT IMAGING | Facility: HOSPITAL | Age: 49
End: 2024-03-18
Attending: EMERGENCY MEDICINE
Payer: COMMERCIAL

## 2024-03-18 VITALS
DIASTOLIC BLOOD PRESSURE: 94 MMHG | OXYGEN SATURATION: 99 % | SYSTOLIC BLOOD PRESSURE: 211 MMHG | HEIGHT: 62 IN | TEMPERATURE: 98 F | WEIGHT: 250 LBS | BODY MASS INDEX: 46.01 KG/M2 | RESPIRATION RATE: 24 BRPM | HEART RATE: 65 BPM

## 2024-03-18 DIAGNOSIS — R06.02 SHORTNESS OF BREATH: ICD-10-CM

## 2024-03-18 DIAGNOSIS — I16.0 HYPERTENSIVE URGENCY: Primary | ICD-10-CM

## 2024-03-18 DIAGNOSIS — I15.9 SECONDARY HYPERTENSION: Primary | ICD-10-CM

## 2024-03-18 DIAGNOSIS — R06.00 DYSPNEA, UNSPECIFIED TYPE: ICD-10-CM

## 2024-03-18 LAB
ALBUMIN SERPL-MCNC: 4 G/DL (ref 3.4–5)
ALBUMIN/GLOB SERPL: 1 {RATIO} (ref 1–2)
ALP LIVER SERPL-CCNC: 129 U/L
ALT SERPL-CCNC: 17 U/L
ANION GAP SERPL CALC-SCNC: 7 MMOL/L (ref 0–18)
AST SERPL-CCNC: 17 U/L (ref 15–37)
BASOPHILS # BLD AUTO: 0.03 X10(3) UL (ref 0–0.2)
BASOPHILS NFR BLD AUTO: 0.3 %
BILIRUB SERPL-MCNC: 0.6 MG/DL (ref 0.1–2)
BUN BLD-MCNC: 15 MG/DL (ref 9–23)
CALCIUM BLD-MCNC: 9.5 MG/DL (ref 8.5–10.1)
CHLORIDE SERPL-SCNC: 106 MMOL/L (ref 98–112)
CO2 SERPL-SCNC: 27 MMOL/L (ref 21–32)
CREAT BLD-MCNC: 0.71 MG/DL
D DIMER PPP FEU-MCNC: 0.58 UG/ML FEU (ref ?–0.5)
EGFRCR SERPLBLD CKD-EPI 2021: 106 ML/MIN/1.73M2 (ref 60–?)
EOSINOPHIL # BLD AUTO: 0.11 X10(3) UL (ref 0–0.7)
EOSINOPHIL NFR BLD AUTO: 1.1 %
ERYTHROCYTE [DISTWIDTH] IN BLOOD BY AUTOMATED COUNT: 14.5 %
GLOBULIN PLAS-MCNC: 3.9 G/DL (ref 2.8–4.4)
GLUCOSE BLD-MCNC: 93 MG/DL (ref 70–99)
HCG SERPL QL: NEGATIVE
HCT VFR BLD AUTO: 39.6 %
HGB BLD-MCNC: 12.5 G/DL
IMM GRANULOCYTES # BLD AUTO: 0.02 X10(3) UL (ref 0–1)
IMM GRANULOCYTES NFR BLD: 0.2 %
LYMPHOCYTES # BLD AUTO: 3.5 X10(3) UL (ref 1–4)
LYMPHOCYTES NFR BLD AUTO: 36.2 %
MCH RBC QN AUTO: 26.9 PG (ref 26–34)
MCHC RBC AUTO-ENTMCNC: 31.6 G/DL (ref 31–37)
MCV RBC AUTO: 85.3 FL
MONOCYTES # BLD AUTO: 0.51 X10(3) UL (ref 0.1–1)
MONOCYTES NFR BLD AUTO: 5.3 %
NEUTROPHILS # BLD AUTO: 5.5 X10 (3) UL (ref 1.5–7.7)
NEUTROPHILS # BLD AUTO: 5.5 X10(3) UL (ref 1.5–7.7)
NEUTROPHILS NFR BLD AUTO: 56.9 %
OSMOLALITY SERPL CALC.SUM OF ELEC: 291 MOSM/KG (ref 275–295)
PLATELET # BLD AUTO: 258 10(3)UL (ref 150–450)
POTASSIUM SERPL-SCNC: 3.9 MMOL/L (ref 3.5–5.1)
PROT SERPL-MCNC: 7.9 G/DL (ref 6.4–8.2)
RBC # BLD AUTO: 4.64 X10(6)UL
SODIUM SERPL-SCNC: 140 MMOL/L (ref 136–145)
TROPONIN I SERPL HS-MCNC: 4 NG/L
WBC # BLD AUTO: 9.7 X10(3) UL (ref 4–11)

## 2024-03-18 PROCEDURE — 99285 EMERGENCY DEPT VISIT HI MDM: CPT

## 2024-03-18 PROCEDURE — 71046 X-RAY EXAM CHEST 2 VIEWS: CPT

## 2024-03-18 PROCEDURE — 85025 COMPLETE CBC W/AUTO DIFF WBC: CPT | Performed by: EMERGENCY MEDICINE

## 2024-03-18 PROCEDURE — 84703 CHORIONIC GONADOTROPIN ASSAY: CPT | Performed by: EMERGENCY MEDICINE

## 2024-03-18 PROCEDURE — 99212 OFFICE O/P EST SF 10 MIN: CPT

## 2024-03-18 PROCEDURE — 84484 ASSAY OF TROPONIN QUANT: CPT

## 2024-03-18 PROCEDURE — 85379 FIBRIN DEGRADATION QUANT: CPT | Performed by: EMERGENCY MEDICINE

## 2024-03-18 PROCEDURE — 93010 ELECTROCARDIOGRAM REPORT: CPT

## 2024-03-18 PROCEDURE — 99284 EMERGENCY DEPT VISIT MOD MDM: CPT

## 2024-03-18 PROCEDURE — 96374 THER/PROPH/DIAG INJ IV PUSH: CPT

## 2024-03-18 PROCEDURE — 80053 COMPREHEN METABOLIC PANEL: CPT | Performed by: EMERGENCY MEDICINE

## 2024-03-18 PROCEDURE — 93005 ELECTROCARDIOGRAM TRACING: CPT

## 2024-03-18 PROCEDURE — 70450 CT HEAD/BRAIN W/O DYE: CPT | Performed by: EMERGENCY MEDICINE

## 2024-03-18 PROCEDURE — 85025 COMPLETE CBC W/AUTO DIFF WBC: CPT

## 2024-03-18 PROCEDURE — 80053 COMPREHEN METABOLIC PANEL: CPT

## 2024-03-18 PROCEDURE — 84484 ASSAY OF TROPONIN QUANT: CPT | Performed by: EMERGENCY MEDICINE

## 2024-03-18 RX ORDER — HYDRALAZINE HYDROCHLORIDE 20 MG/ML
10 INJECTION INTRAMUSCULAR; INTRAVENOUS ONCE
Status: COMPLETED | OUTPATIENT
Start: 2024-03-18 | End: 2024-03-18

## 2024-03-18 NOTE — ED PROVIDER NOTES
Patient Seen in: [facility]      History     Chief Complaint   Patient presents with    Shortness Of Breath     Stated Complaint: Shortness of breath    Subjective:   HPI    48-year-old female with a history of hypertension, obesity presents to the immediate care for complaints of shortness of breath.  Patient states that she has had increased shortness of breath for the last several days.  It is exacerbated with exertion.  She denies any chest pain.  Denies any nausea, vomiting, diaphoresis.  Patient states that she recently saw her primary care doctor.  She was noted to have a blood pressure 1 somebody in the clinic.  Patient's had her blood pressure increased by her primary care doctor but because her symptoms became worse she came to the immediate care today.  Patient denies any pleuritic chest pain.  Denies any coronary artery disease.  On arrival here in the immediate care her initial triage blood pressure was 224/97.  A repeat was 211/94.    Objective:   Past Medical History:   Diagnosis Date    Arrhythmia     Atrial tachycardia 10/23/2019    Bilateral carpal tunnel syndrome 2005    BPPV (benign paroxysmal positional vertigo) 06/29/2012    Chronic hypertension     Edema 06/29/2012    Environmental allergies 04/16/2014    Fatigue 06/29/2012    Fibroids 06/29/2012    Ganglion of tendon sheath     GERD (gastroesophageal reflux disease)     Herpes simplex virus (HSV) infection 06/29/2012    Low back - had some itching & a rash. Recurred in same area once. No issue for awhile.     History of pelvic ultrasound 02/16/2022 2/16/22 Pelvic US - per [facility]CP [facility] 6 mm. Lfet fundal fibroid 2.8 cm (previously 3.2 cm), right anterior fibroid 1.7 cm (previously 1.8 cm), ovaries normal. Trace free fluid in cul de sac.     Insulin resistance 06/29/2012    Iron deficiency anemia secondary to inadequate dietary iron intake 06/29/2012    Migraine without aura     just photophobia    Migraines     Morbid obesity  with BMI of 45.0-49.9, adult (HCC) 06/2012    PAC (premature atrial contraction) 10/19/2018    PAF (paroxysmal atrial fibrillation) (HCC) 10/19/2018    Pain in joint, lower leg 06/29/2012    Palpitations     Pap smear for cervical cancer screening 10/25/2021    Pap & HPV negative.     Personal history of urinary calculi     Prediabetes 04/2019    PVC (premature ventricular contraction) 10/19/2018    Screening mammogram for breast cancer 12/03/2021    Negative    Shortness of breath 06/29/2012    Sleep apnea     Unspecified sinusitis (chronic)     Visual impairment     glasses    Vitamin D deficiency 10/2011    Consistently since 2011    Wears glasses     Weight gain 06/17/2021              Past Surgical History:   Procedure Laterality Date    COLONOSCOPY N/A 4/6/2023    Procedure: COLONOSCOPY;  Surgeon: Meera Packer MD;  Location:  ENDOSCOPY    CORRECT BUNION,OTHR METHODS      FOOT/TOES SURGERY PROC UNLISTED      bunion surgery at 16 years old - both                 Social History     Socioeconomic History    Marital status:    Tobacco Use    Smoking status: Never     Passive exposure: Never    Smokeless tobacco: Never   Vaping Use    Vaping Use: Never used   Substance and Sexual Activity    Alcohol use: Yes     Alcohol/week: 0.0 standard drinks of alcohol     Comment: socially    Drug use: No    Sexual activity: Yes     Partners: Male     Birth control/protection: Vasectomy   Other Topics Concern    Caffeine Concern Yes     Comment: occ pop    Exercise No              Review of Systems    Positive for stated complaint: Shortness of breath  Other systems are as noted in HPI.  Constitutional and vital signs reviewed.      All other systems reviewed and negative except as noted above.    Physical Exam     ED Triage Vitals [03/18/24 1847]   BP (!) 167/101   Pulse 65   Resp 24   Temp 97.9 °F (36.6 °C)   Temp src Temporal   SpO2 99 %   O2 Device None (Room air)       Current:BP (!) 211/94   Pulse 65    Temp 97.9 °F (36.6 °C) (Temporal)   Resp 24   Ht 157.5 cm (5' 2\")   Wt 113.4 kg   LMP 03/06/2023 (Exact Date)   SpO2 99%   BMI 45.73 kg/m²         Physical Exam    General: Alert and oriented. No acute distress.  HEENT: Normocephalic. No evidence of trauma. Extraocular movements are intact.  Cardiovascular exam: Regular rate and rhythm  Lungs: Clear to auscultation bilaterally.  Abdomen: Soft, nondistended, nontender.  Extremities: No evidence of deformity. No clubbing or cyanosis.  Neuro: No focal deficit is noted.    ED Course   Labs Reviewed - No data to display  Patient states that she feels dyspneic even at rest at times.  With her elevated blood pressures, her symptoms of exertional shortness of breath, feel the patient would require further workup and evaluation in the emergency department.  Patient will be referred to the emergency department for further evaluation            MDM   This is a 48-year-old female with a history of hypertension, obesity who presents to the immediate care for complaints of increased exertional dyspnea.  She has a history of paroxysmal atrial fibrillation.  Denies any history of any recent palpitations or chest pain.  Patient is noted to be markedly hypertensive here in the immediate care and will be referred to the emergency department.                                 Medical Decision Making      Disposition and Plan     Clinical Impression:  1. Hypertensive urgency    2. Dyspnea, unspecified type         Disposition:  Ic to ed  3/18/2024  7:03 pm    Follow-up:  No follow-up provider specified.        Medications Prescribed:  Current Discharge Medication List

## 2024-03-18 NOTE — ED INITIAL ASSESSMENT (HPI)
Pt here for sob since the weekend.   Increase sob when walking up the stairs.   Denies chest pain.     Denies cough.     Denies h/a.

## 2024-03-19 ENCOUNTER — APPOINTMENT (OUTPATIENT)
Dept: CT IMAGING | Facility: HOSPITAL | Age: 49
End: 2024-03-19
Attending: EMERGENCY MEDICINE
Payer: COMMERCIAL

## 2024-03-19 VITALS
RESPIRATION RATE: 20 BRPM | HEART RATE: 80 BPM | TEMPERATURE: 97 F | OXYGEN SATURATION: 100 % | SYSTOLIC BLOOD PRESSURE: 156 MMHG | WEIGHT: 260 LBS | HEIGHT: 62 IN | DIASTOLIC BLOOD PRESSURE: 88 MMHG | BODY MASS INDEX: 47.84 KG/M2

## 2024-03-19 LAB
ATRIAL RATE: 67 BPM
P AXIS: 48 DEGREES
P-R INTERVAL: 162 MS
Q-T INTERVAL: 400 MS
QRS DURATION: 74 MS
QTC CALCULATION (BEZET): 422 MS
R AXIS: -3 DEGREES
T AXIS: 24 DEGREES
VENTRICULAR RATE: 67 BPM

## 2024-03-19 PROCEDURE — 71260 CT THORAX DX C+: CPT | Performed by: EMERGENCY MEDICINE

## 2024-03-19 RX ORDER — AMLODIPINE BESYLATE 5 MG/1
5 TABLET ORAL DAILY
Qty: 30 TABLET | Refills: 0 | Status: SHIPPED | OUTPATIENT
Start: 2024-03-19

## 2024-03-19 NOTE — ED PROVIDER NOTES
Patient Seen in: [facility] Emergency Department      History     Chief Complaint   Patient presents with    Difficulty Breathing    Hypertension     Stated Complaint: SOB, HTN from IC    Subjective:   HPI    48-year-old female presenting emerged part for exertional shortness of breath she has been noticing some exertional shortness of breath saw her doctor and was increased on her metoprolol did not help address her blood pressure she had been on previously for an arrhythmia issue.  Patient states that Menias her chest pain she has had some sinus congestion but no fevers.  She does report a headache over the weekend when her blood pressure was elevated seem to be migraine in the left side of her head.  She denies any other complaints.    Objective:   Past Medical History:   Diagnosis Date    Arrhythmia     Atrial tachycardia 10/23/2019    Bilateral carpal tunnel syndrome 2005    BPPV (benign paroxysmal positional vertigo) 06/29/2012    Chronic hypertension     Edema 06/29/2012    Environmental allergies 04/16/2014    Fatigue 06/29/2012    Fibroids 06/29/2012    Ganglion of tendon sheath     GERD (gastroesophageal reflux disease)     Herpes simplex virus (HSV) infection 06/29/2012    Low back - had some itching & a rash. Recurred in same area once. No issue for awhile.     History of pelvic ultrasound 02/16/2022 2/16/22 Pelvic US - per PCP EMS 6 mm. Lfet fundal fibroid 2.8 cm (previously 3.2 cm), right anterior fibroid 1.7 cm (previously 1.8 cm), ovaries normal. Trace free fluid in cul de sac.     Insulin resistance 06/29/2012    Iron deficiency anemia secondary to inadequate dietary iron intake 06/29/2012    Migraine without aura     just photophobia    Migraines     Morbid obesity with BMI of 45.0-49.9, adult (HCC) 06/2012    PAC (premature atrial contraction) 10/19/2018    PAF (paroxysmal atrial fibrillation) ([facility]) 10/19/2018    Pain in joint, lower leg 06/29/2012    Palpitations     Pap smear for  cervical cancer screening 10/25/2021    Pap & HPV negative.     Personal history of urinary calculi     Prediabetes 04/2019    PVC (premature ventricular contraction) 10/19/2018    Screening mammogram for breast cancer 12/03/2021    Negative    Shortness of breath 06/29/2012    Sleep apnea     Unspecified sinusitis (chronic)     Visual impairment     glasses    Vitamin D deficiency 10/2011    Consistently since 2011    Wears glasses     Weight gain 06/17/2021              Past Surgical History:   Procedure Laterality Date    COLONOSCOPY N/A 4/6/2023    Procedure: COLONOSCOPY;  Surgeon: Meera Packer MD;  Location:  ENDOSCOPY    CORRECT BUNION,OTHR METHODS      FOOT/TOES SURGERY PROC UNLISTED      bunion surgery at 16 years old - both                 Social History     Socioeconomic History    Marital status:    Tobacco Use    Smoking status: Never     Passive exposure: Never    Smokeless tobacco: Never   Vaping Use    Vaping Use: Never used   Substance and Sexual Activity    Alcohol use: Yes     Alcohol/week: 0.0 standard drinks of alcohol     Comment: socially    Drug use: No    Sexual activity: Yes     Partners: Male     Birth control/protection: Vasectomy   Other Topics Concern    Caffeine Concern Yes     Comment: occ pop    Exercise No              Review of Systems    Positive for stated complaint: SOB, HTN from IC  Other systems are as noted in HPI.  Constitutional and vital signs reviewed.      All other systems reviewed and negative except as noted above.    Physical Exam     ED Triage Vitals [03/18/24 2002]   BP (!) 195/99   Pulse 64   Resp 20   Temp 97.4 °F (36.3 °C)   Temp src Temporal   SpO2 96 %   O2 Device None (Room air)       Current:/88   Pulse 75   Temp 97.4 °F (36.3 °C) (Temporal)   Resp 15   Ht 157.5 cm (5' 2\")   Wt 117.9 kg   LMP 03/06/2023 (Exact Date)   SpO2 100%   BMI 47.55 kg/m²         Physical Exam  Awake alert patient appears no distress HEENT exam is normal  lungs are clear cardiovascular exam shows regular rhythm abdomen soft nontender extremities no Cyanosis or edema no rash back exam is normal skin is nondiaphoretic no focal neurologic deficits       ED Course     Labs Reviewed   COMP METABOLIC PANEL (14) - Abnormal; Notable for the following components:       Result Value    Alkaline Phosphatase 129 (*)     All other components within normal limits   D-DIMER - Abnormal; Notable for the following components:    D-Dimer 0.58 (*)     All other components within normal limits   TROPONIN I HIGH SENSITIVITY - Normal   HCG, BETA SUBUNIT, QUAL - Normal   CBC WITH DIFFERENTIAL WITH PLATELET    Narrative:     The following orders were created for panel order CBC With Differential With Platelet.  Procedure                               Abnormality         Status                     ---------                               -----------         ------                     CBC W/ DIFFERENTIAL[[id number]]                              Final result                 Please view results for these tests on the individual orders.   RAINBOW DRAW BLUE   UA HOLD   CBC W/ DIFFERENTIAL     EKG    Rate, intervals and axes as noted on EKG Report.  Rate: 67  Rhythm: Sinus Rhythm  Reading: No areas of  , ST segment elevation or depression                 Pdifferential diagnosis includes pneumoniacongestive heart failure         MDM                                         Medical Decision Making  [id number] presenting with exertional shortness of breath IV established cardiac monitor shows a sinus rhythm pulse ox shows no signs of hypoxia.  Patient has an IV established cardiac monitor shows sinus rhythm pulse ox shows no signs of hypoxia.  CBC shows stable hemoglobin level metabolic panel stable renal function.  Independent interpretation by ED physician chest x-ray shows no focal consolidation.  Independent interpretation by ED physician of CT scan shows no pulmonary embolism chest x-ray  shows no pneumothorax.  Blood levels normal no indication of GI reviewed.  Patient was treated with hydralazine for blood pressure which is since show improvement she will be discharged home placed on Norvasc as an outpatient other than her metoprolol and is to return emerged part for worsening symptoms or other complaints  The patient was screened and evaluated during this visit.  As a treating physician attending to the patient, I determined, within reasonable clinical confidence and prior to discharge, that an emergency medical condition was not or was no longer present.  There was no indication for further evaluation, treatment or admission on an emergency basis.    The usual and customary discharge instructions were discussed given the patient's ER course.  We discussed signs and symptoms that should prompt the patient's immediate return to the emergency department.  Reasonable over-the-counter and prescription treatment options and physician follow-up plan was discussed.  Patient was discharged home in good condition  This note was prepared using Dragon Medical voice recognition dictation software.  As a result errors may occur.  When identified to these areas have been corrected.  While every attempt is made to correct errors during dictation discrepancies may still exist.  Please contact if there are any errors    Problems Addressed:  Secondary hypertension: acute illness or injury  Shortness of breath: acute illness or injury    Amount and/or Complexity of Data Reviewed  Labs: ordered. Decision-making details documented in ED Course.  Radiology: ordered and independent interpretation performed. Decision-making details documented in ED Course.  ECG/medicine tests: ordered and independent interpretation performed. Decision-making details documented in ED Course.        Disposition and Plan     Clinical Impression:  1. Secondary hypertension    2. Shortness of breath         Disposition:  Discharge  3/19/2024  12:58 am    Follow-up:  Jenny Santizo DO  [address] [address] [phone]    Follow up in 1 week(s)            Medications Prescribed:  Current Discharge Medication List        START taking these medications    Details   amLODIPine 5 MG Oral Tab Take 1 tablet (5 mg total) by mouth daily.  Qty: 30 tablet, Refills: 0

## 2024-03-19 NOTE — ED QUICK NOTES
Rounding Completed    Plan of Care reviewed. Waiting for CT scan.  Elimination needs assessed.  Provided update and comfort.    Bed is locked and in lowest position. Call light within reach.

## 2024-03-19 NOTE — ED INITIAL ASSESSMENT (HPI)
Pt reports TSERING for a few weeks which seemed to have gotten worse since the weekend kylah with exertion. Also reports on and off CP, \"mostly after eating, think it's indigestion. Today feeling the chest discomfort, probably anxiety kylah when they told me to come to ER.\" Pt seen at [facility], advised to come for /94.     Pt states she is taking Metoprolol which has been increased to 50 mg Daily since 2/20. /99, 179/102 during Triage, HR-64.

## 2024-04-12 ENCOUNTER — PATIENT OUTREACH (OUTPATIENT)
Dept: FAMILY MEDICINE CLINIC | Facility: CLINIC | Age: 49
End: 2024-04-12

## 2024-04-17 ENCOUNTER — OFFICE VISIT (OUTPATIENT)
Dept: FAMILY MEDICINE CLINIC | Facility: CLINIC | Age: 49
End: 2024-04-17
Payer: COMMERCIAL

## 2024-04-17 VITALS
SYSTOLIC BLOOD PRESSURE: 150 MMHG | RESPIRATION RATE: 18 BRPM | HEIGHT: 62 IN | DIASTOLIC BLOOD PRESSURE: 88 MMHG | WEIGHT: 265 LBS | BODY MASS INDEX: 48.76 KG/M2 | HEART RATE: 66 BPM | OXYGEN SATURATION: 100 %

## 2024-04-17 DIAGNOSIS — G43.109 MIGRAINE WITH AURA AND WITHOUT STATUS MIGRAINOSUS, NOT INTRACTABLE: ICD-10-CM

## 2024-04-17 DIAGNOSIS — Z79.899 MEDICATION MANAGEMENT: ICD-10-CM

## 2024-04-17 DIAGNOSIS — I10 ESSENTIAL HYPERTENSION: Primary | ICD-10-CM

## 2024-04-17 DIAGNOSIS — E66.01 MORBID OBESITY (HCC): ICD-10-CM

## 2024-04-17 PROCEDURE — 99214 OFFICE O/P EST MOD 30 MIN: CPT | Performed by: FAMILY MEDICINE

## 2024-04-17 NOTE — PROGRESS NOTES
[facility], [address] [address]  Dept: [phone]       Patient:  Yelitza Acevedo  :      1975  MRN:      [id number]    Chief Complaint:    Chief Complaint   Patient presents with    Weight Loss    Hypertension       SUBJECTIVE     History of Present Illness:  Yelitza is being seen today for a follow-up for weight and BP.  Patient states that her blood pressure at home has been relatively normal.  She states she never tried the amlodipine.  She is currently taking the metoprolol only.    Past Medical History:   Past Medical History:    Arrhythmia    Atrial tachycardia (HCC)    Bilateral carpal tunnel syndrome    BPPV (benign paroxysmal positional vertigo)    Chronic hypertension    Edema    Environmental allergies    Fatigue    Fibroids    Ganglion of tendon sheath    GERD (gastroesophageal reflux disease)    Herpes simplex virus (HSV) infection    Low back - had some itching & a rash. Recurred in same area once. No issue for awhile.     History of pelvic ultrasound    22 Pelvic US - per PCP EMS 6 mm. Lfet fundal fibroid 2.8 cm (previously 3.2 cm), right anterior fibroid 1.7 cm (previously 1.8 cm), ovaries normal. Trace free fluid in cul de sac.     Insulin resistance    Iron deficiency anemia secondary to inadequate dietary iron intake    Migraine without aura    just photophobia    Migraines    Morbid obesity with BMI of 45.0-49.9, adult (HCC)    PAC (premature atrial contraction)    PAF (paroxysmal atrial fibrillation) (HCC)    Pain in joint, lower leg    Palpitations    Pap smear for cervical cancer screening    Pap & HPV negative.     Personal history of urinary calculi    Prediabetes    PVC (premature ventricular contraction)    Screening mammogram for breast cancer    Negative    Shortness of breath    Sleep apnea    Unspecified sinusitis (chronic)    Visual impairment    glasses    Vitamin D  deficiency    Consistently since 2011    Wears glasses    Weight gain        Comorbidities:  HTN -- no change    OBJECTIVE     Vitals: /88   Pulse 66   Resp 18   Ht 5' 2\" (1.575 m)   Wt 265 lb (120.2 kg)   SpO2 100%   BMI 48.47 kg/m²     Initial weight loss: -5   Total weight loss: -5    Start weight: 260    Wt Readings from Last 3 Encounters:   04/17/24 265 lb (120.2 kg)   03/18/24 260 lb (117.9 kg)   03/18/24 250 lb (113.4 kg)       Patient Medications:    Current Outpatient Medications   Medication Sig Dispense Refill    metoprolol succinate ER 50 MG Oral Tablet 24 Hr Take 1 tablet (50 mg total) by mouth daily. 90 tablet 0    ibuprofen 600 MG Oral Tab Take 1 tablet (600 mg total) by mouth every 8 (eight) hours as needed for Pain. 90 tablet 1    topiramate 50 MG Oral Tab Take 1 tablet (50 mg total) by mouth nightly. 90 tablet 1    OMEPRAZOLE 40 MG Oral Capsule Delayed Release TAKE 1 CAPSULE(40 MG) BY MOUTH DAILY 90 capsule 1    JACKIE NOVOA OR Take by mouth daily.      NON FORMULARY daily. Soursat      NON FORMULARY daily. moranga      NON FORMULARY daily. Sea chavez      NON FORMULARY daily. vilberry      ergocalciferol 1.25 MG (15523 UT) Oral Cap Take 1 capsule (50,000 Units total) by mouth once a week. With food for 6 months total.  Recheck lab in july 24 capsule 0    naproxen 500 MG Oral Tab Take 1 tablet (500 mg total) by mouth 2 (two) times daily as needed. 20 tablet 0    metoprolol succinate ER 25 MG Oral Tablet 24 Hr Take 2 tablets (50 mg total) by mouth daily.      acetaminophen 500 MG Oral Tab Take 2 tablets (1,000 mg total) by mouth every 6 (six) hours as needed for Pain.      Vitamin D3 50 MCG (2000 UT) Oral Cap Take 1 capsule (2,000 Units total) by mouth daily.      Fluticasone Propionate 50 MCG/ACT Nasal Suspension 2 sprays by Nasal route daily. (Patient taking differently: 2 sprays by Nasal route as needed.) 16 g 0    amLODIPine 5 MG Oral Tab Take 1 tablet (5 mg total) by mouth daily.  (Patient not taking: Reported on 4/17/2024) 30 tablet 0     Allergies:  Patient has no known allergies.     Social History:    Social History     Socioeconomic History    Marital status:      Spouse name: Not on file    Number of children: Not on file    Years of education: Not on file    Highest education level: Not on file   Occupational History    Not on file   Tobacco Use    Smoking status: Never     Passive exposure: Never    Smokeless tobacco: Never   Vaping Use    Vaping status: Never Used   Substance and Sexual Activity    Alcohol use: Yes     Alcohol/week: 0.0 standard drinks of alcohol     Comment: socially    Drug use: No    Sexual activity: Yes     Partners: Male     Birth control/protection: Vasectomy   Other Topics Concern     Service Not Asked    Blood Transfusions Not Asked    Caffeine Concern Yes     Comment: occ pop    Occupational Exposure Not Asked    Hobby Hazards Not Asked    Sleep Concern Not Asked    Stress Concern Not Asked    Weight Concern Not Asked    Special Diet Not Asked    Back Care Not Asked    Exercise No    Bike Helmet Not Asked    Seat Belt Not Asked    Self-Exams Not Asked   Social History Narrative    Not on file     Social Determinants of Health     Financial Resource Strain: Not on file   Food Insecurity: Not on file   Transportation Needs: Not on file   Physical Activity: Not on file   Stress: Not on file   Social Connections: Not on file   Housing Stability: Not on file     Surgical History:    Past Surgical History:   Procedure Laterality Date    Colonoscopy N/A 4/6/2023    Procedure: COLONOSCOPY;  Surgeon: Meera Packer MD;  Location:  ENDOSCOPY    Correct bunion,othr methods      Foot/toes surgery proc unlisted      bunion surgery at 16 years old - both      Family History:    Family History   Problem Relation Age of Onset    Heart Attack Maternal Grandmother     Diabetes Maternal Grandmother     Hypertension Mother         Mom menopause at 51     Carotid stenosis Mother     Hypertension Father     Asthma Daughter     No Known Problems Son     No Known Problems Maternal Grandfather     No Known Problems Paternal Grandmother     No Known Problems Paternal Grandfather     Asthma Son     Prostate Cancer Neg     Colon Cancer Neg     Uterine Cancer Neg     Pancreatic Cancer Neg     Ovarian Cancer Neg     Breast Cancer Neg     Endometriosis Neg     Infertility Neg     Thyroid disease Neg     Endometrial Cancer Neg      Food Journal  Reviewed and Discussed:          Patient has a Food Journal?:  no   Patient is reading nutrition labels?  yes  Average Caloric Intake:          unsure  Average CHO Intake:  unsure  Is patient exercising?  yes  Type of exercise?  Walk for  1 Hour and Other:  1 Hour     Eating Habits  Patient states the following:  Eats 2 meal(s) per day  Length of time it takes to consume a meal:  15 mins  # of snacks per day: 1-2          Type of snacks:  fruit, kind bars, popcorn, chips  Amount of soda consumption per day:  everyone once in a while will drink a ginger ale  Amount of water (in ounces) per day:  16-64 oz  Drinking between meals only:  no  Toughest challenge:  Trying to get the weight off      Nutritional Goals  Other: focus on food journal    Behavior Modifications Reviewed and Discussed  Drink 64 oz of water per day, Maintain a daily food journal, Utlize portion control strategies to reduce calorie intake, Identify triggers for eating and manage cues, and Eat slowly and take 20 to 30 minutes to complete each meal    Exercise Goals Reviewed and Discussed    Walk for  1 Hour    ROS:    Pertinent items are noted in HPI.  A comprehensive review of systems was negative.  All other systems were reviewed and are negative    Physical Exam:   /88   Pulse 66   Resp 18   Ht 5' 2\" (1.575 m)   Wt 265 lb (120.2 kg)   SpO2 100%   BMI 48.47 kg/m²   General appearance: alert, appears stated age, and cooperative  Head: Normocephalic, without  obvious abnormality, atraumatic  Neck: no adenopathy, no carotid bruit, no JVD, supple, symmetrical, trachea midline, and thyroid not enlarged, symmetric, no tenderness/mass/nodules  Lungs: clear to auscultation bilaterally  Heart: S1, S2 normal, no murmur, click, rub or gallop, regular rate and rhythm  Abdomen: soft, non-tender; bowel sounds normal; no masses,  no organomegaly  Extremities: extremities normal, atraumatic, no cyanosis or edema  Skin: Skin color, texture, turgor normal. No rashes or lesions    ASSESSMENT/PLAN     Encounter Diagnoses   Name Primary?    Morbid obesity (HCC)     Migraine with aura and without status migrainosus, not intractable     Essential hypertension Yes    Medication management        No orders of the defined types were placed in this encounter.      Meds & Refills for this Visit:  Requested Prescriptions      No prescriptions requested or ordered in this encounter       Imaging & Consults:  None      Patient will focus on food journaling.  Advised to focus on portion control.  Drink plenty of water.  Cont. Topamax.    Start amlodipine 5 mg daily.  Cont. Metoprolol ER 50 mg daily.  Monitor BP.    Return in about 6 weeks (around 5/29/2024) for HTN check, weight check.

## 2024-04-17 NOTE — PROGRESS NOTES
Food Journal  Reviewed and Discussed:          Patient has a Food Journal?:  no   Patient is reading nutrition labels?  yes  Average Caloric Intake:          unsure  Average CHO Intake:  unsure  Is patient exercising?  yes  Type of exercise?  Walk for  1 Hour and Other:  1 Hour     Eating Habits  Patient states the following:  Eats 1 meal(s) per day  Length of time it takes to consume a meal:  15 mins  # of snacks per day: 1-2          Type of snacks:  fruit, kind bars, popcorn, chips  Amount of soda consumption per day:  everyone once in a while will drink a ginger ale  Amount of water (in ounces) per day:  16-64 oz  Drinking between meals only:  no  Toughest challenge:  Trying to get the weight off

## 2024-05-10 ENCOUNTER — LAB ENCOUNTER (OUTPATIENT)
Dept: LAB | Age: 49
End: 2024-05-10
Attending: FAMILY MEDICINE

## 2024-05-10 DIAGNOSIS — E55.9 VITAMIN D DEFICIENCY: ICD-10-CM

## 2024-05-10 DIAGNOSIS — N91.2 AMENORRHEA: ICD-10-CM

## 2024-05-10 DIAGNOSIS — R73.03 PREDIABETES: ICD-10-CM

## 2024-05-10 DIAGNOSIS — E78.2 MIXED HYPERLIPIDEMIA: ICD-10-CM

## 2024-05-10 LAB
ALBUMIN SERPL-MCNC: 3.9 G/DL (ref 3.4–5)
ALBUMIN/GLOB SERPL: 1.1 {RATIO} (ref 1–2)
ALP LIVER SERPL-CCNC: 114 U/L
ALT SERPL-CCNC: 19 U/L
ANION GAP SERPL CALC-SCNC: 4 MMOL/L (ref 0–18)
AST SERPL-CCNC: 12 U/L (ref 15–37)
BILIRUB SERPL-MCNC: 0.6 MG/DL (ref 0.1–2)
BUN BLD-MCNC: 13 MG/DL (ref 9–23)
CALCIUM BLD-MCNC: 9.3 MG/DL (ref 8.5–10.1)
CHLORIDE SERPL-SCNC: 109 MMOL/L (ref 98–112)
CHOLEST SERPL-MCNC: 151 MG/DL (ref ?–200)
CO2 SERPL-SCNC: 29 MMOL/L (ref 21–32)
CREAT BLD-MCNC: 0.74 MG/DL
CREAT UR-SCNC: 284 MG/DL
EGFRCR SERPLBLD CKD-EPI 2021: 101 ML/MIN/1.73M2 (ref 60–?)
EST. AVERAGE GLUCOSE BLD GHB EST-MCNC: 131 MG/DL (ref 68–126)
ESTRADIOL SERPL-MCNC: 114.6 PG/ML
FASTING PATIENT LIPID ANSWER: YES
FASTING STATUS PATIENT QL REPORTED: YES
FSH SERPL-ACNC: 13.8 MIU/ML
GLOBULIN PLAS-MCNC: 3.7 G/DL (ref 2.8–4.4)
GLUCOSE BLD-MCNC: 76 MG/DL (ref 70–99)
HBA1C MFR BLD: 6.2 % (ref ?–5.7)
HDLC SERPL-MCNC: 54 MG/DL (ref 40–59)
LDLC SERPL CALC-MCNC: 85 MG/DL (ref ?–100)
LH SERPL-ACNC: 15.6 MIU/ML
MICROALBUMIN UR-MCNC: 1.22 MG/DL
MICROALBUMIN/CREAT 24H UR-RTO: 4.3 UG/MG (ref ?–30)
NONHDLC SERPL-MCNC: 97 MG/DL (ref ?–130)
OSMOLALITY SERPL CALC.SUM OF ELEC: 293 MOSM/KG (ref 275–295)
POTASSIUM SERPL-SCNC: 3.9 MMOL/L (ref 3.5–5.1)
PROT SERPL-MCNC: 7.6 G/DL (ref 6.4–8.2)
SODIUM SERPL-SCNC: 142 MMOL/L (ref 136–145)
TRIGL SERPL-MCNC: 56 MG/DL (ref 30–149)
VIT D+METAB SERPL-MCNC: 24.9 NG/ML (ref 30–100)
VLDLC SERPL CALC-MCNC: 9 MG/DL (ref 0–30)

## 2024-05-10 PROCEDURE — 83002 ASSAY OF GONADOTROPIN (LH): CPT

## 2024-05-10 PROCEDURE — 80061 LIPID PANEL: CPT

## 2024-05-10 PROCEDURE — 83001 ASSAY OF GONADOTROPIN (FSH): CPT

## 2024-05-10 PROCEDURE — 83036 HEMOGLOBIN GLYCOSYLATED A1C: CPT

## 2024-05-10 PROCEDURE — 80053 COMPREHEN METABOLIC PANEL: CPT

## 2024-05-10 PROCEDURE — 82670 ASSAY OF TOTAL ESTRADIOL: CPT

## 2024-05-10 PROCEDURE — 82570 ASSAY OF URINE CREATININE: CPT

## 2024-05-10 PROCEDURE — 82306 VITAMIN D 25 HYDROXY: CPT

## 2024-05-10 PROCEDURE — 82043 UR ALBUMIN QUANTITATIVE: CPT

## 2024-05-13 DIAGNOSIS — R74.8 ELEVATED ALKALINE PHOSPHATASE LEVEL: Primary | ICD-10-CM

## 2024-05-30 ENCOUNTER — OFFICE VISIT (OUTPATIENT)
Dept: FAMILY MEDICINE CLINIC | Facility: CLINIC | Age: 49
End: 2024-05-30

## 2024-05-30 VITALS
HEIGHT: 62 IN | HEART RATE: 66 BPM | BODY MASS INDEX: 48.95 KG/M2 | SYSTOLIC BLOOD PRESSURE: 138 MMHG | OXYGEN SATURATION: 100 % | RESPIRATION RATE: 18 BRPM | WEIGHT: 266 LBS | DIASTOLIC BLOOD PRESSURE: 90 MMHG

## 2024-05-30 DIAGNOSIS — N94.6 MENSTRUAL CRAMPS: ICD-10-CM

## 2024-05-30 DIAGNOSIS — E66.01 MORBID OBESITY (HCC): ICD-10-CM

## 2024-05-30 DIAGNOSIS — I10 ESSENTIAL HYPERTENSION: Primary | ICD-10-CM

## 2024-05-30 DIAGNOSIS — N92.6 IRREGULAR PERIODS: ICD-10-CM

## 2024-05-30 DIAGNOSIS — G47.30 SLEEP APNEA, UNSPECIFIED TYPE: ICD-10-CM

## 2024-05-30 DIAGNOSIS — N93.9 ABNORMAL UTERINE BLEEDING: ICD-10-CM

## 2024-05-30 DIAGNOSIS — R06.02 CHRONIC SHORTNESS OF BREATH: ICD-10-CM

## 2024-05-30 PROCEDURE — 99214 OFFICE O/P EST MOD 30 MIN: CPT | Performed by: FAMILY MEDICINE

## 2024-05-31 NOTE — PROGRESS NOTES
Yelitza Acevedo is a 48 year old female.  HPI:   Patient is here for follow-up on her blood pressure and menstrual cramps.  She would also like to review recent blood work.  Patient states that her blood pressure at home is usually in the 120s over 80s.  In the office today, her blood pressure was running in the 150s over 90s and then decreased over time to 138/90.  Her machine at home seems to be accurate.  Patient complains of menstrual cramps.  She feels she has not had a period in a long time and states that she gets the cramps without having a flow and feels she has a flow after not having one for over 1 year.  PT. Complains of shrortess of breath that comes and goes for some time now and not necessarily related to anything and comes and goes for different amounts of time.  History of sleep apnea.     Current Outpatient Medications   Medication Sig Dispense Refill    metoprolol succinate ER 50 MG Oral Tablet 24 Hr Take 1 tablet (50 mg total) by mouth daily. 90 tablet 0    ibuprofen 600 MG Oral Tab Take 1 tablet (600 mg total) by mouth every 8 (eight) hours as needed for Pain. 90 tablet 1    topiramate 50 MG Oral Tab Take 1 tablet (50 mg total) by mouth nightly. 90 tablet 1    OMEPRAZOLE 40 MG Oral Capsule Delayed Release TAKE 1 CAPSULE(40 MG) BY MOUTH DAILY 90 capsule 1    JACKIE NOVOA OR Take by mouth daily.      NON FORMULARY daily. Soursat      NON FORMULARY daily. moranga      NON FORMULARY daily. Sea chavez      NON FORMULARY daily. vilberry      ergocalciferol 1.25 MG (24938 UT) Oral Cap Take 1 capsule (50,000 Units total) by mouth once a week. With food for 6 months total.  Recheck lab in july 24 capsule 0    naproxen 500 MG Oral Tab Take 1 tablet (500 mg total) by mouth 2 (two) times daily as needed. 20 tablet 0    metoprolol succinate ER 25 MG Oral Tablet 24 Hr Take 2 tablets (50 mg total) by mouth daily.      acetaminophen 500 MG Oral Tab Take 2 tablets (1,000 mg total) by mouth every 6 (six)  hours as needed for Pain.      Vitamin D3 50 MCG (2000 UT) Oral Cap Take 1 capsule (2,000 Units total) by mouth daily.      Fluticasone Propionate 50 MCG/ACT Nasal Suspension 2 sprays by Nasal route daily. (Patient taking differently: 2 sprays by Nasal route as needed.) 16 g 0    amLODIPine 5 MG Oral Tab Take 1 tablet (5 mg total) by mouth daily. (Patient not taking: Reported on 4/17/2024) 30 tablet 0     No current facility-administered medications for this visit.      No Known Allergies   Past Medical History:    Arrhythmia    Atrial tachycardia (HCC)    Bilateral carpal tunnel syndrome    BPPV (benign paroxysmal positional vertigo)    Chronic hypertension    Edema    Environmental allergies    Fatigue    Fibroids    Ganglion of tendon sheath    GERD (gastroesophageal reflux disease)    Herpes simplex virus (HSV) infection    Low back - had some itching & a rash. Recurred in same area once. No issue for awhile.     History of pelvic ultrasound    2/16/22 Pelvic US - per [facility]CP [facility] 6 mm. Lfet fundal fibroid 2.8 cm (previously 3.2 cm), right anterior fibroid 1.7 cm (previously 1.8 cm), ovaries normal. Trace free fluid in cul de sac.     Insulin resistance    Iron deficiency anemia secondary to inadequate dietary iron intake    Migraine without aura    just photophobia    Migraines    Morbid obesity with BMI of 45.0-49.9, adult (HCC)    PAC (premature atrial contraction)    PAF (paroxysmal atrial fibrillation) (HCC)    Pain in joint, lower leg    Palpitations    Pap smear for cervical cancer screening    Pap & HPV negative.     Personal history of urinary calculi    Prediabetes    PVC (premature ventricular contraction)    Screening mammogram for breast cancer    Negative    Shortness of breath    Sleep apnea    Unspecified sinusitis (chronic)    Visual impairment    glasses    Vitamin D deficiency    Consistently since 2011    Wears glasses    Weight gain      Social History:  Social History     Socioeconomic History     Marital status:    Tobacco Use    Smoking status: Never     Passive exposure: Never    Smokeless tobacco: Never   Vaping Use    Vaping status: Never Used   Substance and Sexual Activity    Alcohol use: Yes     Alcohol/week: 0.0 standard drinks of alcohol     Comment: socially    Drug use: No    Sexual activity: Yes     Partners: Male     Birth control/protection: Vasectomy   Other Topics Concern    Caffeine Concern Yes     Comment: occ pop    Exercise No        Results for orders placed or performed in visit on 05/10/24   Estradiol [E]   Result Value Ref Range    Estradiol 114.6 No established range for female sex pg/mL   Comp Metabolic Panel (14) [E]   Result Value Ref Range    Glucose 76 70 - 99 mg/dL    Sodium 142 136 - 145 mmol/L    Potassium 3.9 3.5 - 5.1 mmol/L    Chloride 109 98 - 112 mmol/L    CO2 29.0 21.0 - 32.0 mmol/L    Anion Gap 4 0 - 18 mmol/L    BUN 13 9 - 23 mg/dL    Creatinine 0.74 0.55 - 1.02 mg/dL    Calcium, Total 9.3 8.5 - 10.1 mg/dL    Calculated Osmolality 293 275 - 295 mOsm/kg    eGFR-Cr 101 >=60 mL/min/1.73m2    AST 12 (L) 15 - 37 U/L    ALT 19 13 - 56 U/L    Alkaline Phosphatase 114 (H) 39 - 100 U/L    Bilirubin, Total 0.6 0.1 - 2.0 mg/dL    Total Protein 7.6 6.4 - 8.2 g/dL    Albumin 3.9 3.4 - 5.0 g/dL    Globulin  3.7 2.8 - 4.4 g/dL    A/G Ratio 1.1 1.0 - 2.0    Patient Fasting for CMP? Yes    Lipid Panel [E]   Result Value Ref Range    Cholesterol, Total 151 <200 mg/dL    HDL Cholesterol 54 40 - 59 mg/dL    Triglycerides 56 30 - 149 mg/dL    LDL Cholesterol 85 <100 mg/dL    VLDL 9 0 - 30 mg/dL    Non HDL Chol 97 <130 mg/dL    Patient Fasting for Lipid? Yes    Hemoglobin A1C [E]   Result Value Ref Range    HgbA1C 6.2 (H) <5.7 %    Estimated Average Glucose 131 (H) 68 - 126 mg/dL   Vitamin D, 25-Hydroxy   Result Value Ref Range    Vitamin D, 25OH, Total 24.9 (L) 30.0 - 100.0 ng/mL   Microalb/Creat Ratio, Random Urine   Result Value Ref Range    Microalbumin, Urine 1.22 mg/dL     Creatinine Ur Random 284.00 mg/dL    Malb/Cre Calc 4.3 <=30.0 ug/mg   FSH   Result Value Ref Range    FSH 13.8 No established range for female sex mIU/mL   LH (Luteinizing Hormone)   Result Value Ref Range    LH 15.6 No established range for female sex mIU/mL       REVIEW OF SYSTEMS:   GENERAL: feels well otherwise  SKIN: denies any unusual skin lesions  LUNGS: denies shortness of breath with exertion  CARDIOVASCULAR: denies chest pain on exertion  GI: denies abdominal pain,denies heartburn  MUSCULOSKELETAL: denies back pain  EXTREMITIES:  No pain or numbness    EXAM:   /90 (BP Location: Left arm, Patient Position: Sitting, Cuff Size: large)   Pulse 66   Resp 18   Ht 5' 2\" (1.575 m)   Wt 266 lb (120.7 kg)   LMP 05/20/2024 (Exact Date)   SpO2 100%   BMI 48.65 kg/m²   GENERAL: well developed, well nourished,in no apparent distress  SKIN: no rashes,no suspicious lesions  HEENT: atraumatic, normocephalic,ears and throat are clear  NECK: supple,no adenopathy,no bruits  LUNGS: clear to auscultation  CARDIO: RRR without murmur  GI: soft, good BS's  EXTREMITIES: no cyanosis, clubbing or edema    ASSESSMENT AND PLAN:     Encounter Diagnoses   Name Primary?    Chronic shortness of breath Yes    Sleep apnea, unspecified type     Morbid obesity (HCC)     Abnormal uterine bleeding     Irregular periods     Menstrual cramps        No orders of the defined types were placed in this encounter.      Meds & Refills for this Visit:  Requested Prescriptions      No prescriptions requested or ordered in this encounter       Imaging & Consults:  OP REFERRAL TO DIAGNOSTIC SLEEP STUDY  [facility] - INTERNAL  US PELVIS (TRANSABDOMINAL AND TRANSVAGINAL) (CPT=76856/61338)    Advised gyne for eval.  Advised US pelvis.  Advised PFT and sleep study.  Monitor BP.      The patient indicates understanding of these issues and agrees to the plan.  No follow-ups on file.

## 2024-06-25 ENCOUNTER — HOSPITAL ENCOUNTER (OUTPATIENT)
Dept: LAB | Facility: HOSPITAL | Age: 49
Discharge: HOME | End: 2024-06-25
Attending: FAMILY MEDICINE

## 2024-06-25 DIAGNOSIS — R74.8 ELEVATED ALKALINE PHOSPHATASE LEVEL: ICD-10-CM

## 2024-06-25 LAB — GGT SERPL-CCNC: 17 U/L

## 2024-06-25 PROCEDURE — 84075 ASSAY ALKALINE PHOSPHATASE: CPT | Performed by: STUDENT IN AN ORGANIZED HEALTH CARE EDUCATION/TRAINING PROGRAM

## 2024-06-25 PROCEDURE — 82977 ASSAY OF GGT: CPT | Performed by: STUDENT IN AN ORGANIZED HEALTH CARE EDUCATION/TRAINING PROGRAM

## 2024-06-25 PROCEDURE — 36415 COLL VENOUS BLD VENIPUNCTURE: CPT | Performed by: STUDENT IN AN ORGANIZED HEALTH CARE EDUCATION/TRAINING PROGRAM

## 2024-06-25 PROCEDURE — 84080 ASSAY ALKALINE PHOSPHATASES: CPT | Performed by: STUDENT IN AN ORGANIZED HEALTH CARE EDUCATION/TRAINING PROGRAM

## 2024-06-26 ENCOUNTER — RT VISIT (OUTPATIENT)
Dept: RESPIRATORY THERAPY | Facility: HOSPITAL | Age: 49
End: 2024-06-26
Attending: FAMILY MEDICINE

## 2024-06-26 DIAGNOSIS — R06.02 CHRONIC SHORTNESS OF BREATH: ICD-10-CM

## 2024-06-26 LAB
ALK PHOSPHATASE: 124 IU/L
BONE FRAC: 50 %
INTESTINAL FRAC: 1 %
LIVER FRAC: 49 %

## 2024-06-26 PROCEDURE — 94729 DIFFUSING CAPACITY: CPT | Performed by: INTERNAL MEDICINE

## 2024-06-26 PROCEDURE — 94726 PLETHYSMOGRAPHY LUNG VOLUMES: CPT | Performed by: INTERNAL MEDICINE

## 2024-06-26 PROCEDURE — 94010 BREATHING CAPACITY TEST: CPT | Performed by: INTERNAL MEDICINE

## 2024-06-28 NOTE — PROCEDURES
Patient is a 48-year-old female being assessed with a diagnosis of shortness of breath.    Results    FEV1 1.81 L mildly reduced 72% of predicted with Z-score -1.78  FVC 2.14 L 71% of predicted reduced with a Z-score -1.95  Ratio is normal 85%    MVV is moderately reduced 56% of predicted    Flow volume loop with a restrictive type pattern  Total lung capacity 3.34 L reduced at 69% of predicted with a moderately reduced Z-score  Residual volume 1.2 L 90% of predicted with a normal Z-score    Diffusion capacity is 90% of predicted corrects to 134% of predicted.    Impression-no evidence of airways obstruction.  There is reduced timed volumes.  Moderately reduced total lung capacity with preserved diffusion capacity suggests a restrictive defect on the basis of body habitus with clinical correlation suggested to assess for evolving interstitial lung disease neuromuscular abnormalities.    No prior studies found

## 2024-07-01 DIAGNOSIS — R06.02 CHRONIC SHORTNESS OF BREATH: Primary | ICD-10-CM

## 2024-07-07 ENCOUNTER — HOSPITAL ENCOUNTER (OUTPATIENT)
Dept: ULTRASOUND IMAGING | Age: 49
Discharge: HOME | End: 2024-07-07
Attending: FAMILY MEDICINE
Payer: COMMERCIAL

## 2024-07-07 ENCOUNTER — HOSPITAL ENCOUNTER (OUTPATIENT)
Dept: ULTRASOUND IMAGING | Age: 49
End: 2024-07-07
Attending: FAMILY MEDICINE
Payer: COMMERCIAL

## 2024-07-07 DIAGNOSIS — N94.6 MENSTRUAL CRAMPS: ICD-10-CM

## 2024-07-07 DIAGNOSIS — N92.6 IRREGULAR PERIODS: ICD-10-CM

## 2024-07-07 PROCEDURE — 76856 US EXAM PELVIC COMPLETE: CPT | Performed by: FAMILY MEDICINE

## 2024-07-07 PROCEDURE — 76830 TRANSVAGINAL US NON-OB: CPT | Performed by: FAMILY MEDICINE

## 2024-07-08 ENCOUNTER — OFFICE VISIT (OUTPATIENT)
Dept: SLEEP CENTER | Age: 49
End: 2024-07-08
Attending: Other
Payer: COMMERCIAL

## 2024-07-08 DIAGNOSIS — G47.30 SLEEP APNEA, UNSPECIFIED TYPE: ICD-10-CM

## 2024-07-08 DIAGNOSIS — E66.01 MORBID OBESITY (HCC): ICD-10-CM

## 2024-07-08 DIAGNOSIS — R06.02 CHRONIC SHORTNESS OF BREATH: ICD-10-CM

## 2024-07-08 PROCEDURE — 95806 SLEEP STUDY UNATT&RESP EFFT: CPT

## 2024-07-16 ENCOUNTER — OFFICE VISIT (OUTPATIENT)
Dept: FAMILY MEDICINE CLINIC | Facility: CLINIC | Age: 49
End: 2024-07-16
Payer: COMMERCIAL

## 2024-07-16 VITALS
WEIGHT: 264 LBS | HEIGHT: 62 IN | HEART RATE: 73 BPM | BODY MASS INDEX: 48.58 KG/M2 | SYSTOLIC BLOOD PRESSURE: 122 MMHG | DIASTOLIC BLOOD PRESSURE: 72 MMHG | OXYGEN SATURATION: 99 %

## 2024-07-16 DIAGNOSIS — Z00.00 LABORATORY EXAMINATION ORDERED AS PART OF A ROUTINE GENERAL MEDICAL EXAMINATION: ICD-10-CM

## 2024-07-16 DIAGNOSIS — E66.01 MORBID OBESITY (HCC): ICD-10-CM

## 2024-07-16 DIAGNOSIS — Z13.89 SCREENING FOR GENITOURINARY CONDITION: ICD-10-CM

## 2024-07-16 DIAGNOSIS — E55.9 VITAMIN D DEFICIENCY: ICD-10-CM

## 2024-07-16 DIAGNOSIS — G47.30 SLEEP APNEA, UNSPECIFIED TYPE: ICD-10-CM

## 2024-07-16 DIAGNOSIS — K21.9 GASTROESOPHAGEAL REFLUX DISEASE, UNSPECIFIED WHETHER ESOPHAGITIS PRESENT: ICD-10-CM

## 2024-07-16 DIAGNOSIS — I10 ESSENTIAL HYPERTENSION: Primary | ICD-10-CM

## 2024-07-16 DIAGNOSIS — R06.02 CHRONIC SHORTNESS OF BREATH: ICD-10-CM

## 2024-07-16 PROCEDURE — 99214 OFFICE O/P EST MOD 30 MIN: CPT | Performed by: FAMILY MEDICINE

## 2024-07-16 RX ORDER — METOPROLOL SUCCINATE 50 MG/1
50 TABLET, EXTENDED RELEASE ORAL DAILY
Qty: 90 TABLET | Refills: 1 | Status: SHIPPED | OUTPATIENT
Start: 2024-07-16

## 2024-07-16 RX ORDER — OMEPRAZOLE 40 MG/1
40 CAPSULE, DELAYED RELEASE ORAL DAILY
Qty: 90 CAPSULE | Refills: 1 | Status: SHIPPED | OUTPATIENT
Start: 2024-07-16

## 2024-07-16 NOTE — PROGRESS NOTES
Yelitza Acevedo is a 49 year old female.  HPI:   Patient is here for follow-up on her blood pressure.  Patient states that her blood pressure at home runs anywhere from the 120s to 130s over 70s to 80s.  She states that half numbers are greater than 130/80 and have the numbers are less than that.  Patient states she recently started working out again since she is feeling better.  She will be seeing the weight loss clinic next month.  Patient recently had her pulmonary function test.  She will follow-up with pulmonary.  Patient also recently had her sleep study and is awaiting results.  Patient will be due for physical in October.  Medications reviewed.    Current Outpatient Medications   Medication Sig Dispense Refill    amLODIPine 5 MG Oral Tab Take 1 tablet (5 mg total) by mouth daily. 30 tablet 0    metoprolol succinate ER 50 MG Oral Tablet 24 Hr Take 1 tablet (50 mg total) by mouth daily. 90 tablet 0    ibuprofen 600 MG Oral Tab Take 1 tablet (600 mg total) by mouth every 8 (eight) hours as needed for Pain. 90 tablet 1    topiramate 50 MG Oral Tab Take 1 tablet (50 mg total) by mouth nightly. 90 tablet 1    OMEPRAZOLE 40 MG Oral Capsule Delayed Release TAKE 1 CAPSULE(40 MG) BY MOUTH DAILY 90 capsule 1    JACKIE NOVOA OR Take by mouth daily.      NON FORMULARY daily. Soursat      NON FORMULARY daily. moranga      NON FORMULARY daily. Sea chavez      NON FORMULARY daily. vilberry      ergocalciferol 1.25 MG (81163 UT) Oral Cap Take 1 capsule (50,000 Units total) by mouth once a week. With food for 6 months total.  Recheck lab in july 24 capsule 0    naproxen 500 MG Oral Tab Take 1 tablet (500 mg total) by mouth 2 (two) times daily as needed. 20 tablet 0    metoprolol succinate ER 25 MG Oral Tablet 24 Hr Take 2 tablets (50 mg total) by mouth daily.      acetaminophen 500 MG Oral Tab Take 2 tablets (1,000 mg total) by mouth every 6 (six) hours as needed for Pain.      Vitamin D3 50 MCG (2000 UT) Oral Cap Take 1  capsule (2,000 Units total) by mouth daily.      Fluticasone Propionate 50 MCG/ACT Nasal Suspension 2 sprays by Nasal route daily. (Patient taking differently: 2 sprays by Nasal route as needed.) 16 g 0     No current facility-administered medications for this visit.      No Known Allergies   Past Medical History:    Arrhythmia    Atrial tachycardia (HCC)    Bilateral carpal tunnel syndrome    BPPV (benign paroxysmal positional vertigo)    Chronic hypertension    Edema    Environmental allergies    Fatigue    Fibroids    Ganglion of tendon sheath    GERD (gastroesophageal reflux disease)    Herpes simplex virus (HSV) infection    Low back - had some itching & a rash. Recurred in same area once. No issue for awhile.     History of pelvic ultrasound    2/16/22 Pelvic US - per [facility]CP [facility] 6 mm. Lfet fundal fibroid 2.8 cm (previously 3.2 cm), right anterior fibroid 1.7 cm (previously 1.8 cm), ovaries normal. Trace free fluid in cul de sac.     Insulin resistance    Iron deficiency anemia secondary to inadequate dietary iron intake    Migraine without aura    just photophobia    Migraines    Morbid obesity with BMI of 45.0-49.9, adult (HCC)    PAC (premature atrial contraction)    PAF (paroxysmal atrial fibrillation) (HCC)    Pain in joint, lower leg    Palpitations    Pap smear for cervical cancer screening    Pap & HPV negative.     Personal history of urinary calculi    Prediabetes    PVC (premature ventricular contraction)    Screening mammogram for breast cancer    Negative    Shortness of breath    Sleep apnea    Unspecified sinusitis (chronic)    Visual impairment    glasses    Vitamin D deficiency    Consistently since 2011    Wears glasses    Weight gain      Social History:  Social History     Socioeconomic History    Marital status:    Tobacco Use    Smoking status: Never     Passive exposure: Never    Smokeless tobacco: Never   Vaping Use    Vaping status: Never Used   Substance and Sexual Activity     Alcohol use: Yes     Alcohol/week: 0.0 standard drinks of alcohol     Comment: socially    Drug use: No    Sexual activity: Yes     Partners: Male     Birth control/protection: Vasectomy   Other Topics Concern    Caffeine Concern Yes     Comment: occ pop    Exercise No        Results for orders placed or performed during the hospital encounter of 06/25/24   Alk Phos Isoenzyme [E]   Result Value Ref Range    Alk Phosphatase 124 (H) 44 - 121 IU/L    Liver Frac 49 18 - 85 %    Bone Frac 50 14 - 68 %    Intestinal Frac 1 0 - 18 %   GGT (Gamma Glutamyl Transpeptidase) [E]   Result Value Ref Range    Gamma Glutamyl Transferase 17 5 - 55 U/L       REVIEW OF SYSTEMS:   GENERAL: feels well otherwise  SKIN: denies any unusual skin lesions  LUNGS: denies shortness of breath with exertion  CARDIOVASCULAR: denies chest pain on exertion  GI: denies abdominal pain,denies heartburn  MUSCULOSKELETAL: denies back pain  EXTREMITIES:  No pain or numbness    EXAM:   /72   Pulse 73   Ht 5' 2\" (1.575 m)   Wt 264 lb (119.7 kg)   LMP 05/20/2024 (Exact Date)   SpO2 99%   BMI 48.29 kg/m²   GENERAL: well developed, well nourished,in no apparent distress  SKIN: no rashes,no suspicious lesions  HEENT: atraumatic, normocephalic  NECK: supple,no adenopathy,no bruits  LUNGS: clear to auscultation  CARDIO: RRR without murmur  GI: soft, good BS's  EXTREMITIES: no cyanosis, clubbing or edema    ASSESSMENT AND PLAN:     Encounter Diagnoses   Name Primary?    Essential hypertension Yes    Vitamin D deficiency     Chronic shortness of breath     Sleep apnea, unspecified type     Morbid obesity (HCC)     Laboratory examination ordered as part of a routine general medical examination     Gastroesophageal reflux disease, unspecified whether esophagitis present     Screening for genitourinary condition        Orders Placed This Encounter   Procedures    Comp Metabolic Panel (14) [E]    Lipid Panel [E]    Hemoglobin A1C [E]    CBC With  Differential With Platelet    TSH W Reflex To Free T4    Vitamin D, 25-Hydroxy    Urinalysis, Routine       Meds & Refills for this Visit:  Requested Prescriptions     Signed Prescriptions Disp Refills    metoprolol succinate ER 50 MG Oral Tablet 24 Hr 90 tablet 1     Sig: Take 1 tablet (50 mg total) by mouth daily.    Omeprazole 40 MG Oral Capsule Delayed Release 90 capsule 1     Sig: Take 1 capsule (40 mg total) by mouth daily.       Imaging & Consults:  None    Hypertension-continue amlodipine and metoprolol and monitor blood pressure.  Continue low-salt diet.  Morbid obesity-continue to stay active and see weight loss clinic.  Abnormal PFT-advised to follow-up with pulmonary  Sleep apnea-awaiting sleep study results.  Labs ordered for physical.    The patient indicates understanding of these issues and agrees to the plan.  Return in about 3 months (around 10/16/2024) for physical.

## 2024-07-17 ENCOUNTER — SLEEP STUDY (OUTPATIENT)
Facility: CLINIC | Age: 49
End: 2024-07-17
Payer: COMMERCIAL

## 2024-07-17 DIAGNOSIS — G47.30 SLEEP APNEA, UNSPECIFIED TYPE: Primary | ICD-10-CM

## 2024-07-17 PROCEDURE — 95806 SLEEP STUDY UNATT&RESP EFFT: CPT | Performed by: INTERNAL MEDICINE

## 2024-07-19 ENCOUNTER — TELEPHONE (OUTPATIENT)
Facility: CLINIC | Age: 49
End: 2024-07-19

## 2024-07-28 ENCOUNTER — HOSPITAL ENCOUNTER (EMERGENCY)
Facility: HOSPITAL | Age: 49
Discharge: HOME | End: 2024-07-28
Attending: EMERGENCY MEDICINE
Payer: COMMERCIAL

## 2024-07-28 VITALS
RESPIRATION RATE: 16 BRPM | OXYGEN SATURATION: 98 % | DIASTOLIC BLOOD PRESSURE: 88 MMHG | SYSTOLIC BLOOD PRESSURE: 152 MMHG | HEART RATE: 71 BPM

## 2024-07-28 DIAGNOSIS — G43.C0 PERIODIC HEADACHE SYNDROME, NOT INTRACTABLE: Primary | ICD-10-CM

## 2024-07-28 PROCEDURE — 99284 EMERGENCY DEPT VISIT MOD MDM: CPT

## 2024-07-28 PROCEDURE — 96365 THER/PROPH/DIAG IV INF INIT: CPT

## 2024-07-28 PROCEDURE — 96367 TX/PROPH/DG ADDL SEQ IV INF: CPT

## 2024-07-28 PROCEDURE — 96375 TX/PRO/DX INJ NEW DRUG ADDON: CPT

## 2024-07-28 PROCEDURE — 96361 HYDRATE IV INFUSION ADD-ON: CPT

## 2024-07-28 RX ORDER — MAGNESIUM SULFATE HEPTAHYDRATE 40 MG/ML
2 INJECTION, SOLUTION INTRAVENOUS ONCE
Status: COMPLETED | OUTPATIENT
Start: 2024-07-28 | End: 2024-07-28

## 2024-07-28 RX ORDER — KETOROLAC TROMETHAMINE 15 MG/ML
15 INJECTION, SOLUTION INTRAMUSCULAR; INTRAVENOUS ONCE
Status: COMPLETED | OUTPATIENT
Start: 2024-07-28 | End: 2024-07-28

## 2024-07-28 RX ORDER — SODIUM CHLORIDE 9 MG/ML
1000 INJECTION, SOLUTION INTRAVENOUS ONCE
Status: COMPLETED | OUTPATIENT
Start: 2024-07-28 | End: 2024-07-28

## 2024-07-28 RX ORDER — HALOPERIDOL 5 MG/ML
2.5 INJECTION INTRAMUSCULAR ONCE
Status: COMPLETED | OUTPATIENT
Start: 2024-07-28 | End: 2024-07-28

## 2024-07-28 RX ORDER — BUTALBITAL, ACETAMINOPHEN AND CAFFEINE 50; 325; 40 MG/1; MG/1; MG/1
1-2 TABLET ORAL EVERY 6 HOURS PRN
Qty: 10 TABLET | Refills: 0 | Status: SHIPPED | OUTPATIENT
Start: 2024-07-28 | End: 2024-08-02

## 2024-07-28 RX ORDER — METOCLOPRAMIDE HYDROCHLORIDE 5 MG/ML
10 INJECTION INTRAMUSCULAR; INTRAVENOUS ONCE
Status: COMPLETED | OUTPATIENT
Start: 2024-07-28 | End: 2024-07-28

## 2024-07-28 RX ORDER — DEXAMETHASONE SODIUM PHOSPHATE 10 MG/ML
10 INJECTION, SOLUTION INTRAMUSCULAR; INTRAVENOUS ONCE
Status: COMPLETED | OUTPATIENT
Start: 2024-07-28 | End: 2024-07-28

## 2024-07-28 RX ORDER — DIPHENHYDRAMINE HYDROCHLORIDE 50 MG/ML
50 INJECTION INTRAMUSCULAR; INTRAVENOUS ONCE
Status: COMPLETED | OUTPATIENT
Start: 2024-07-28 | End: 2024-07-28

## 2024-07-28 NOTE — ED PROVIDER NOTES
Patient Seen in: [facility] Emergency Department      History     Chief Complaint   Patient presents with    Headache    Nausea/Vomiting/Diarrhea     Stated Complaint: HA and vomiting    Subjective:   HPI    49-year-old female with a history of migraine headaches presents to the emergency department complaining of a frontal headache consistent with her migraines that awakened her from sleep around 2 AM.  She took emesis acetaminophen but then subsequently began vomiting and had her  drive her to the emergency department for evaluation.  She has also had a few episodes of diarrhea.  No fever, neck stiffness, unilateral numbness or weakness or other physical complaints.    Objective:   Past Medical History:    Arrhythmia    Atrial tachycardia (HCC)    Bilateral carpal tunnel syndrome    BPPV (benign paroxysmal positional vertigo)    Chronic hypertension    Edema    Environmental allergies    Fatigue    Fibroids    Ganglion of tendon sheath    GERD (gastroesophageal reflux disease)    Herpes simplex virus (HSV) infection    Low back - had some itching & a rash. Recurred in same area once. No issue for awhile.     History of pelvic ultrasound    2/16/22 Pelvic US - per PCP EMS 6 mm. Lfet fundal fibroid 2.8 cm (previously 3.2 cm), right anterior fibroid 1.7 cm (previously 1.8 cm), ovaries normal. Trace free fluid in cul de sac.     Insulin resistance    Iron deficiency anemia secondary to inadequate dietary iron intake    Migraine without aura    just photophobia    Migraines    Morbid obesity with BMI of 45.0-49.9, adult (HCC)    PAC (premature atrial contraction)    PAF (paroxysmal atrial fibrillation) (HCC)    Pain in joint, lower leg    Palpitations    Pap smear for cervical cancer screening    Pap & HPV negative.     Personal history of urinary calculi    Prediabetes    PVC (premature ventricular contraction)    Screening mammogram for breast cancer    Negative    Shortness of breath    Sleep apnea     Unspecified sinusitis (chronic)    Visual impairment    glasses    Vitamin D deficiency    Consistently since 2011    Wears glasses    Weight gain              Past Surgical History:   Procedure Laterality Date    Colonoscopy N/A 4/6/2023    Procedure: COLONOSCOPY;  Surgeon: Meera Packer MD;  Location:  ENDOSCOPY    Correct bunion,othr methods      Foot/toes surgery proc unlisted      bunion surgery at 16 years old - both                 Social History     Socioeconomic History    Marital status:    Tobacco Use    Smoking status: Never     Passive exposure: Never    Smokeless tobacco: Never   Vaping Use    Vaping status: Never Used   Substance and Sexual Activity    Alcohol use: Yes     Alcohol/week: 0.0 standard drinks of alcohol     Comment: socially    Drug use: No    Sexual activity: Yes     Partners: Male     Birth control/protection: Vasectomy   Other Topics Concern    Caffeine Concern Yes     Comment: occ pop    Exercise No              Review of Systems    Positive for stated Chief Complaint: Headache and Nausea/Vomiting/Diarrhea    Other systems are as noted in HPI.  Constitutional and vital signs reviewed.      All other systems reviewed and negative except as noted above.    Physical Exam     ED Triage Vitals   BP 07/28/24 1045 (!) 153/95   Pulse 07/28/24 1045 83   Resp 07/28/24 1130 16   Temp --    Temp src --    SpO2 07/28/24 1045 99 %   O2 Device 07/28/24 1045 None (Room air)       Current Vitals:   Vital Signs  BP: 152/88  Pulse: 71  Resp: 16  MAP (mmHg): (!) 113    Oxygen Therapy  SpO2: 98 %  O2 Device: None (Room air)            Physical Exam    General appearance: This is a middle-aged female sitting on a gurney.  Vital signs were reviewed per nurses notes.  HEENT: Normocephalic atraumatic.  Anicteric sclera.  Oral mucosa is moist.  Neck: No adenopathy or thyromegaly.  Lungs are clear to auscultation.  Heart exam: Normal S1-S2 without extra sounds or murmurs.  Regular rate and  rhythm.  Abdomen is nontender.  Extremities are atraumatic.  Skin is dry without rashes or lesions.  Neuroexam: Alert and oriented x 4.  Speech is fluent.  Moving all 4 extremities well.    ED Course   Labs Reviewed - No data to display          Intravenous access was obtained.  A LTO protocol was initiated with Toradol, Reglan, Benadryl and IV fluids.    Update at 1130: Pain initially was 10/10.  Now 9/10 in severity.  Haldol and magnesium ordered.    Updated 1215: Pain currently 7/10.  Patient has not vomited since initial arrival.  Requesting additional medication.  Depakote and Decadron given.    Just prior to discharge the patient stated that her pain was currently 5/10.  She was agreeable to going home.       MDM      #1.  Acute migraine headache.  No fever to suggest meningitis or encephalitis.  No thunderclap headache or injury to suggest intracranial hemorrhage.  Symptomatically improved after migraine cocktails.  Discharged home with a prescription for Fioricet and PCP follow-up.                                   Medical Decision Making      Disposition and Plan     Clinical Impression:  1. Periodic headache syndrome, not intractable         Disposition:  Discharge  7/28/2024  1:45 pm    Follow-up:  Jenny Santizo DO  [address] [address] [phone]    Call  As needed          Medications Prescribed:  Discharge Medication List as of 7/28/2024  1:55 PM        START taking these medications    Details   butalbital-acetaminophen-caffeine -40 MG Oral Tab Take 1-2 tablets by mouth every 6 (six) hours as needed for Pain., Normal, Disp-10 tablet, R-0

## 2024-08-14 ENCOUNTER — OFFICE VISIT (OUTPATIENT)
Dept: INTERNAL MEDICINE CLINIC | Facility: CLINIC | Age: 49
End: 2024-08-14
Payer: COMMERCIAL

## 2024-08-14 VITALS
SYSTOLIC BLOOD PRESSURE: 110 MMHG | HEART RATE: 96 BPM | HEIGHT: 65 IN | RESPIRATION RATE: 16 BRPM | WEIGHT: 257 LBS | DIASTOLIC BLOOD PRESSURE: 78 MMHG | BODY MASS INDEX: 42.82 KG/M2

## 2024-08-14 DIAGNOSIS — G43.909 MIGRAINE WITHOUT STATUS MIGRAINOSUS, NOT INTRACTABLE, UNSPECIFIED MIGRAINE TYPE: ICD-10-CM

## 2024-08-14 DIAGNOSIS — R73.03 PREDIABETES: ICD-10-CM

## 2024-08-14 DIAGNOSIS — E55.9 VITAMIN D DEFICIENCY: ICD-10-CM

## 2024-08-14 DIAGNOSIS — E66.01 CLASS 3 SEVERE OBESITY WITH SERIOUS COMORBIDITY AND BODY MASS INDEX (BMI) OF 45.0 TO 49.9 IN ADULT, UNSPECIFIED OBESITY TYPE (HCC): ICD-10-CM

## 2024-08-14 DIAGNOSIS — Z51.81 ENCOUNTER FOR THERAPEUTIC DRUG MONITORING: Primary | ICD-10-CM

## 2024-08-14 PROCEDURE — 99214 OFFICE O/P EST MOD 30 MIN: CPT | Performed by: NURSE PRACTITIONER

## 2024-08-14 RX ORDER — TOPIRAMATE 100 MG/1
100 TABLET, FILM COATED ORAL 2 TIMES DAILY
Qty: 180 TABLET | Refills: 1 | Status: SHIPPED | OUTPATIENT
Start: 2024-08-14

## 2024-08-14 NOTE — PROGRESS NOTES
Yelitza Acevedo is a 49 year old female presents today for restart on medical weight loss program for the treatment of overweight, obesity, or morbid obesity.    S:  Current weight   Wt Readings from Last 6 Encounters:   08/14/24 257 lb (116.6 kg)   07/16/24 264 lb (119.7 kg)   05/30/24 266 lb (120.7 kg)   04/17/24 265 lb (120.2 kg)   03/18/24 260 lb (117.9 kg)   03/18/24 250 lb (113.4 kg)    AND BMI Body mass index is 42.77 kg/m²..    Patient has lost 8# since LOV in 11/2022 at 265#. She has remained on Topamax 50 daily for migraines. She started taking compounded tirzepatide through outside provider. No changes in health reported. Some cravings noted and irregular menses with perimenopause.    Testing/consult completed since LOV: Dietician: in the past    Exercise: 5x/week via home plate machine, walking and on occasion weights  Nutrition: patient meal preps.  Sleep: disrupted. Hx of mild FLORENCE, no CPAP, has added Mg+ glycinate   Stress: improved    Social hx and PMH reviewed.  with 3 adult children. Employed in  setting. Hx of migraines.    REVIEW OF SYSTEMS:  GENERAL: feels well otherwise  LUNGS: denies shortness of breath with exertion  CARDIOVASCULAR: denies chest pain on exertion, denies palpitations or pedal edema  GI: denies abdominal pain.  No N/V/D/C  MUSCULOSKELETAL: no acute joint or muscle pain  NEURO: denies headaches or dizziness  PSYCH: denies change in behavior or mood, denies feeling sad or depressed    EXAM:  /78   Pulse 96   Resp 16   Ht 5' 5\" (1.651 m)   Wt 257 lb (116.6 kg)   LMP 06/25/2024 (Exact Date)   BMI 42.77 kg/m²   GENERAL: well developed, well nourished, in no apparent distress, morbidly obese  EYES: conjunctiva pink, sclera non icteric, PERRLA  LUNGS: CTA in all fields, breathing non labored  CARDIO: RRR without murmur, normal S1 and S2 without clicks or gallops, no pedal edema.  GI: +BS  NEURO/MS: motor and sensory grossly intact  PSYCH: pleasant,  cooperative, normal mood and affect    ASSESSMENT AND PLAN:  Encounter Diagnoses   Name Primary?    Encounter for therapeutic drug monitoring Yes    Class 3 severe obesity with serious comorbidity and body mass index (BMI) of 45.0 to 49.9 in adult, unspecified obesity type (HCC)     Prediabetes     Migraine without status migrainosus, not intractable, unspecified migraine type     Vitamin D deficiency        No orders of the defined types were placed in this encounter.      Meds & Refills for this Visit:  Requested Prescriptions     Signed Prescriptions Disp Refills    topiramate 100 MG Oral Tab 180 tablet 1     Sig: Take 1 tablet (100 mg total) by mouth 2 (two) times daily.       Imaging & Consults:  DIETITIAN EDUCATION INITIAL, DIET (INTERNAL)  OP REFERRAL BARIATRIC SURGERY SEMINAR      Plan:  Patient has lost 8# since LOV in 11/2022 on Topamax 50 mg nightly with a total weight loss of -22# since initial consult on 12/6/2018 with initial weight of 235#. Weight loss goal: to feel better and live a healthier life, both physically and metally.  Increase Topamax as directed to assist in weight loss and continue migraine control. Option to maintain compounded tirzepatide through outside provider. No sympathomimetics with hx of Afib and palpitations. No AOMs covered. Labs per PCP. Repeat BC completed.  on building a healthy routine and expectations with bariatric surgery. Refer to dietician and bariatric seminar for consideration. See patient instructions below for additional plans and patient counseling.        Patient Instructions   Continue making lifestyle changes that focus on good nutrition, regular exercise and stress management.    Medication Plan: Increase Topamax to 50 mg twice a day for 1 week and then increase to 100 mg twice a day thereafter. Option to maintain compounded peptide tirzepatide through outside provider.    I currently follow the guidelines and recommendations of the professional  organizations and credentialing bodies of my speciality and licensing. I do not prescribe compounded peptides for the treatment of overweight and obesity. I recommend that anti-obesity medications (AOMs), and their formulations, undergo clinical trial testing for efficacy and safety via processes overseen by the Food and Drug Administration (FDA). The FDA does not approve compounded drugs (e.g., compounded peptides), and without their review, the FDA cannot assure that compounded drugs have the same safety, efficacy, and purity of FDA-approved medications that have gone through clinical trial testing.    For more information regarding compounded peptides I encourage you to visit the FDA link via https://www.fda.gov/drugs/postmarket-drug-safety-information-patients-and-providers/medications-containing-semaglutide-marketed-type-2-diabetes-or-weight-loss    I hope this information allows you to be an informed consumer and can offer support in your future decisions for treatment of the disease of overweight and obesity.    Next steps to work on before next office visit include: Great work in making changes to lifestyle! Recommend seeing dietician for consult, [facility], and consider bariatric seminar attendance.    Consider attending the bariatric seminar, via virtual presentation hosted 2 Monday evenings out of the month, to learn more about surgical options for weight loss.    Call [phone] or in your Internet search browser type in:    [email or web address]    Then click on Attend a FREE bariatric seminar      Building a Healthy Routine for a Healthy Lifestyle  March 3, 2023  Posted in Blog, Motivation, Support  By [employer] [facility]    There are some people who seem to get everything done. They stay on top of tasks at work, manage their health and even spend time doing family activities. These people make it seem easy as they effortlessly  go through life without missing a beat. Others seem to always be playing catch-up, feeling constantly behind and consistently stressed.    What’s the difference between these two people? Building a healthy routine.    What is a Routine?  A routine is a set of actions you do over and over again, consistently. Most people have many routines in their lives. For example, we make routines out of making coffee in the morning and driving to work. Turning on your computer and checking emails as your first work task is also a routine. These are things you do automatically as part of your day.    However, not all routines are healthy. For instance, you can make an unhealthy routine out of going through the drive-thru for breakfast each morning. Maybe you routinely spend an hour or more watching television on the couch each night.    If you find yourself caught in one or more unhealthy routines, take some time to see how you can improve your lifestyle. Building a set of healthy routines throughout your day can make a big difference in both your physical and mental health.    Tips for Building a Healthy Routine  How do you get started? Building a healthy routine can be overwhelming to think about in the beginning stages, so keep these fundamental steps in mind.    Decide what’s important. Your routine can include a lot of different behaviors, so take some time to decide what your priorities are. Are you trying to eat better? Perhaps start by meal prepping on Sundays, making a balanced breakfast each morning or drinking at least three cups of water before lunchtime.    Determine where your routines should fit in. It’s sometimes obvious where in your lifestyle you need to develop healthier routines. It’s also not so obvious some of the time. Take a while to think about what parts of your day need some work. If you struggle to wake up or feel motivated in the morning, perhaps you could add a quick 10-minute walk to your routine.  If your lunch breaks are usually spent sitting down at a desk or table, perhaps you could plan a short walk for this time instead.    Give your routine a try. Try your new routine for a few days and see how it’s working. If all is going well, keep your routine up. Eventually, it will become habitual and a lot easier to stick to. If your routine isn’t working out, don’t feel any shame! Modify it and keep experimenting.    Don’t get overwhelmed. Building a routine that works for you can take time. Don’t expect for it to happen overnight. Give yourself some time to practice and build consistency. Save plenty of room for modification and for celebrating success!    Routines That Can Improve Your Health  Need a few ideas for routines that can help you improve your health? Try one or more of the tips below and see the difference they can make in your life.    Get up a little earlier. Mornings can be stressful when we rush to get everything together for the day. We’ve all had moments where chaotic mornings make it hard to even walk out the door! Having a few extra minutes in the morning can be a game-changer. Try setting your alarm for 30 minutes earlier to allow yourself time to sip on coffee, review your agenda, throw some dinner in a crock pot, write in a journal, or do whatever you need to do you can start your day on a positive note.    Prioritize sleep. People function better when they’re well-rested. Sleep should be a priority in your life, but many of us are guilty of letting ours fall to the wayside. Try making a bedtime ritual to ensure you are getting seven to eight hours of sleep each night. You will feel more energized and focused.    Make a plan. Writing a to-do list in the morning or the night before can help you stay on top of your responsibilities. Being able to see all your tasks in one place can help you stay focused and organized. For bonus points, delegate a task or two to your family. If someone else  can do the grocery shopping while you run other errands or fold laundry, this can save you a lot of time and stress!     Don’t feel ashamed to ask for help.  We all have different personalities, schedules, likes and dislikes. Each one of our routines will be different, so try not to compare yourself to others. What will yours look like?      Realistic Advice for Life after Bariatric Surgery    by Astrid Lindquist RN, CBN, NAY    OAC at www.obesityaction.org Summer 2018 Resource    Can we talk? I mean seriously talk about realistic expectations and your responsibility as a bariatric surgery patient? Okay, good.    Now, go grab your favorite beverage of choice: iced tea (unsweet of course ), a cup of coffee (sure, if it’s decaf ), or everyone’s plain favorite… water (insert eye roll ). Find your favorite reading  spot and let’s have a chat.    As a bariatric surgery patient, you’ve likely heard the following phrase: “Surgery is a tool.” It’s also very likely that, much like me, you have a love/hate relationship with this phrase. As a nurse who has worked with thousands of bariatric surgery patients over the last decade, I whole-heartedly agree with the concept that bariatric surgery does not in and of itself result in your ideal body weight, resolve all of your health conditions and eliminate every problem you’ve ever had. Instead, my dear reader, you have been given the surgically-altered anatomic tool of reduced stomach capacity. This allows you to:    Eat a smaller amount of food  Feel more full with a smaller amount of food  Reduced stomach capacity through bariatric surgery addresses only one of many factors that come together to affect weight.    A Quick Look at the Facts  As a gastric bypass patient who is 17 years post-surgery, this is what I know from experience:    The “tool” still works when I work it. It’s not as new and shiny as it once was. The manual is a little worn and frayed. It needs to be given proper  care to stay in top form.  No one - not my surgeon, dietitian nor my primary care provider - is with me 24 hours a day, seven days a week, 365 days a year.  I alone am responsible for the choices I make every day. This extends far beyond my simple food choices.  Health is a relative term. It ebbs and flows along a continuum (we’ll chat more about this concept later on).  I want you to have some realistic expectations for your life after bariatric surgery. I’m not talking about the first few months or even at the one-year brando. I’m talking about two years, 10 years and even 20 years after you’ve had surgery.    Examining Common Expectations  Let’s begin with weight-loss expectations. I frequently hear patients share concerns about not having “lost enough.” The question I throw back to you is, “What is enough?”    While you were likely given a goal weight before surgery - a number on the scale - you have to realize that number is based on height and weight tables which are decades old. It’s a number that very few bariatric surgery patients will actually reach. So I ask again, “What is enough?”    If your goals are centered on improving or resolving health conditions such as diabetes and high blood pressure, you can focus on real, broader aspirations that don’t concentrate on a number on a scale. That number is a guide, but not the whole story of your weight and health. A better approach may be to set goals to become more mobile, play on the floor with your children or grandchildren or simply get through the day feeling better physically and mentally. Keep these goals front and center as a reminder of why you make the choices you do every single day.    Realistic Goals: Yours and Mine  These are some of my own personal (and I feel realistic) daily goals:    To move just as much - or more - than I did the day before  To drink more water than coffee  To give support whenever I seek support  To encourage others whenever I  feel discouraged  Notice that not a single one of those goals included a number on the scale. Now, take a few moments and write-out your own goals. Your goals should be SMART. In other words, they need to be specific, measurable, achievable, relevant and timely. Mine meet all of these requirements. They are not fancy, but they’re realistic for me. Yours should be realistic for you.    Now that you’ve set some realistic goals, let’s get real about things you can do every day to make these goals become reality.    Take Your Vitamins. These are real, grown-up, bariatric vitamins. Yes, back in 2001 when I had surgery, I too was told to just take two Flintstones Complete chewables. Guess what? Times have changed. Studies have been done. Multiple societies (American Association of Clinical Endocrinologists, The Obesity Society, American Society for Metabolic and Bariatric Surgery1, 2) have made recommendations regarding the need for long-term vitamin and mineral supplementation after bariatric surgery. Please don’t mess around with this one.    Have Your Labs Drawn at Least Once a Year. These include bariatric labs which are reviewed by someone who knows what the results really mean. Ideally, this should be done by your bariatric surgeon or a bariatrician. At a minimum, the following labs should be checked annually: CMP, Hematocrit, Ferritin, B12, Folic Acid, PTH and Vitamin D 25-Hydroxy Intact.    Drink Tons of Fluids Every Day. Yes, tons - to the point where your urine is barely yellow. This not only prevents dehydration, but keeps you feeling full, flushes out toxins, prevents urinary and bladder infections and makes nearly everything in your body work better. No, they don’t have to be decaffeinated - unless your healthcare provider says so. And no, they don’t have to be non-carbonated - unless your healthcare provider says so.    Avoid Liquid Calories. I love [employer] and I know many of you do, too. Can you have a  venti white mocha every day and expect not to struggle with weight maintenance? Probably not. Can you have an occasional short or tall latte? Sure. The same scenario goes for other liquid calories such as milk, juice, etc.    Continue to Separate Eating from Drinking. Once you’re a year or more out from surgery, you can drink pretty much up to the time of eating. However, drinking with your meals at any point after surgery will flush food through your pouch quicker. This can affect calorie intake by allowing you to eat more and to not feel full for as long after a meal.  Wait at least 30 minutes  (ideally an hour) after eating to resume drinking so your feeling of fullness stays with you for as long as possible.    Track Your Behavior. You can’t see where you’re going if you don’t know where you’ve been. Please keep some type of food journal. Use an woody, a sticky note or even your phone camera. Even if you only do it a few days every month, it really does help! I promise.    Stop Beating Yourself Up. We only get one life. It’s time to start living it to the fullest every day. Whatever you did or didn’t do yesterday or this morning, it’s over, gone and finished. What matters is this moment right now. Make the best decision for you in this moment and then make another best decision for you in the next moment. Then keep repeating.    Stop Trying to Meet Someone Else’s Ideal. Wear your Spanx if it makes you feel better, or don’t. Put on makeup if it makes you feel more fierce, or don’t. Be selfish. Take 10-15 minutes for yourself every day. You deserve that and it’s not really being selfish.    Figure Your Mental and Emotional Stuff Out. Will all of the emotional reasons we eat ever be fully resolved? Probably not. Will continuing to stay involved with support groups (true support groups, where you actually find the help you’re seeking) and working with a therapist or counselor help you find alternatives to eating your  emotions? Probably so.    Conclusion  Please take note: we’re nearly to the end of our chat and I’ve not given you a list of foods to avoid, a number of calories to eat or a specific meal plan to follow. There are a couple of reasons why. First, if you work on setting those realistic goals that are specific to you and focus on numbers one through nine on the previous page, the food situation tends to take care of itself. However, I still believe in (and more often than not, follow) the recommendation to eat protein first and for half of every meal, and to keep my carb count low.    This is my last suggestion: don’t forget who you are and where you started from. Be a source of support and strength for others. Enjoy your journey because the destination will always be just out of reach. That’s what keeps us moving forward.      Medication use and SEs reviewed with patient.    Return in about 4 months (around 12/14/2024) for weight management via clinic or VV.    Patient verbalizes understanding.    DOCUMENTATION OF TIME SPENT: Code selection for this visit was based on time spent : 30 minutes on date of service in preparing to see the patient, obtaining and/or reviewing separately obtained history, performing a medically appropriate examination, counseling and educating the patient/family/caregiver, ordering medications or testing, referring and communicating with other healthcare providers, documenting clinical information in the electronic medical record, independently interpreting results and communicating results to the patient/family/caregiver and care coordination with the patient's other providers.

## 2024-08-14 NOTE — PATIENT INSTRUCTIONS
Continue making lifestyle changes that focus on good nutrition, regular exercise and stress management.    Medication Plan: Increase Topamax to 50 mg twice a day for 1 week and then increase to 100 mg twice a day thereafter. Option to maintain compounded peptide tirzepatide through outside provider.    I currently follow the guidelines and recommendations of the professional organizations and credentialing bodies of my speciality and licensing. I do not prescribe compounded peptides for the treatment of overweight and obesity. I recommend that anti-obesity medications (AOMs), and their formulations, undergo clinical trial testing for efficacy and safety via processes overseen by the Food and Drug Administration (FDA). The FDA does not approve compounded drugs (e.g., compounded peptides), and without their review, the FDA cannot assure that compounded drugs have the same safety, efficacy, and purity of FDA-approved medications that have gone through clinical trial testing.    For more information regarding compounded peptides I encourage you to visit the FDA link via https://www.fda.gov/drugs/postmarket-drug-safety-information-patients-and-providers/medications-containing-semaglutide-marketed-type-2-diabetes-or-weight-loss    I hope this information allows you to be an informed consumer and can offer support in your future decisions for treatment of the disease of overweight and obesity.    Next steps to work on before next office visit include: Great work in making changes to lifestyle! Recommend seeing dietician for consult, Praveena, and consider bariatric seminar attendance.    Consider attending the bariatric seminar, via virtual presentation hosted 2 Monday evenings out of the month, to learn more about surgical options for weight loss.    Call [phone] or in your Internet search browser type in:    [email or web address]    Then click on  Attend a FREE bariatric seminar      Building a Healthy Routine for a Healthy Lifestyle  March 3, 2023  Posted in Blog, Motivation, Support  By [employer] [facility] [facility]    There are some people who seem to get everything done. They stay on top of tasks at work, manage their health and even spend time doing family activities. These people make it seem easy as they effortlessly go through life without missing a beat. Others seem to always be playing catch-up, feeling constantly behind and consistently stressed.    What’s the difference between these two people? Building a healthy routine.    What is a Routine?  A routine is a set of actions you do over and over again, consistently. Most people have many routines in their lives. For example, we make routines out of making coffee in the morning and driving to work. Turning on your computer and checking emails as your first work task is also a routine. These are things you do automatically as part of your day.    However, not all routines are healthy. For instance, you can make an unhealthy routine out of going through the drive-thru for breakfast each morning. Maybe you routinely spend an hour or more watching television on the couch each night.    If you find yourself caught in one or more unhealthy routines, take some time to see how you can improve your lifestyle. Building a set of healthy routines throughout your day can make a big difference in both your physical and mental health.    Tips for Building a Healthy Routine  How do you get started? Building a healthy routine can be overwhelming to think about in the beginning stages, so keep these fundamental steps in mind.    Decide what’s important. Your routine can include a lot of different behaviors, so take some time to decide what your priorities are. Are you trying to eat better? Perhaps start by meal prepping on Sundays, making a balanced breakfast each morning or drinking at least three cups of water  before lunchtime.    Determine where your routines should fit in. It’s sometimes obvious where in your lifestyle you need to develop healthier routines. It’s also not so obvious some of the time. Take a while to think about what parts of your day need some work. If you struggle to wake up or feel motivated in the morning, perhaps you could add a quick 10-minute walk to your routine. If your lunch breaks are usually spent sitting down at a desk or table, perhaps you could plan a short walk for this time instead.    Give your routine a try. Try your new routine for a few days and see how it’s working. If all is going well, keep your routine up. Eventually, it will become habitual and a lot easier to stick to. If your routine isn’t working out, don’t feel any shame! Modify it and keep experimenting.    Don’t get overwhelmed. Building a routine that works for you can take time. Don’t expect for it to happen overnight. Give yourself some time to practice and build consistency. Save plenty of room for modification and for celebrating success!    Routines That Can Improve Your Health  Need a few ideas for routines that can help you improve your health? Try one or more of the tips below and see the difference they can make in your life.    Get up a little earlier. Mornings can be stressful when we rush to get everything together for the day. We’ve all had moments where chaotic mornings make it hard to even walk out the door! Having a few extra minutes in the morning can be a game-changer. Try setting your alarm for 30 minutes earlier to allow yourself time to sip on coffee, review your agenda, throw some dinner in a crock pot, write in a journal, or do whatever you need to do you can start your day on a positive note.    Prioritize sleep. People function better when they’re well-rested. Sleep should be a priority in your life, but many of us are guilty of letting ours fall to the wayside. Try making a bedtime ritual to  ensure you are getting seven to eight hours of sleep each night. You will feel more energized and focused.    Make a plan. Writing a to-do list in the morning or the night before can help you stay on top of your responsibilities. Being able to see all your tasks in one place can help you stay focused and organized. For bonus points, delegate a task or two to your family. If someone else can do the grocery shopping while you run other errands or fold laundry, this can save you a lot of time and stress!     Don’t feel ashamed to ask for help.  We all have different personalities, schedules, likes and dislikes. Each one of our routines will be different, so try not to compare yourself to others. What will yours look like?      Realistic Advice for Life after Bariatric Surgery    by Astrid Lindquist RN, CBN, NAY    OAC at www.obesityaction.org Summer 2018 Resource    Can we talk? I mean seriously talk about realistic expectations and your responsibility as a bariatric surgery patient? Okay, good.    Now, go grab your favorite beverage of choice: iced tea (unsweet of course ), a cup of coffee (sure, if it’s decaf ), or everyone’s plain favorite… water (insert eye roll ). Find your favorite reading  spot and let’s have a chat.    As a bariatric surgery patient, you’ve likely heard the following phrase: “Surgery is a tool.” It’s also very likely that, much like me, you have a love/hate relationship with this phrase. As a nurse who has worked with thousands of bariatric surgery patients over the last decade, I whole-heartedly agree with the concept that bariatric surgery does not in and of itself result in your ideal body weight, resolve all of your health conditions and eliminate every problem you’ve ever had. Instead, my dear reader, you have been given the surgically-altered anatomic tool of reduced stomach capacity. This allows you to:    Eat a smaller amount of food  Feel more full with a smaller amount of food  Reduced  stomach capacity through bariatric surgery addresses only one of many factors that come together to affect weight.    A Quick Look at the Facts  As a gastric bypass patient who is 17 years post-surgery, this is what I know from experience:    The “tool” still works when I work it. It’s not as new and shiny as it once was. The manual is a little worn and frayed. It needs to be given proper care to stay in top form.  No one - not my surgeon, dietitian nor my primary care provider - is with me 24 hours a day, seven days a week, 365 days a year.  I alone am responsible for the choices I make every day. This extends far beyond my simple food choices.  Health is a relative term. It ebbs and flows along a continuum (we’ll chat more about this concept later on).  I want you to have some realistic expectations for your life after bariatric surgery. I’m not talking about the first few months or even at the one-year brando. I’m talking about two years, 10 years and even 20 years after you’ve had surgery.    Examining Common Expectations  Let’s begin with weight-loss expectations. I frequently hear patients share concerns about not having “lost enough.” The question I throw back to you is, “What is enough?”    While you were likely given a goal weight before surgery - a number on the scale - you have to realize that number is based on height and weight tables which are decades old. It’s a number that very few bariatric surgery patients will actually reach. So I ask again, “What is enough?”    If your goals are centered on improving or resolving health conditions such as diabetes and high blood pressure, you can focus on real, broader aspirations that don’t concentrate on a number on a scale. That number is a guide, but not the whole story of your weight and health. A better approach may be to set goals to become more mobile, play on the floor with your children or grandchildren or simply get through the day feeling better  physically and mentally. Keep these goals front and center as a reminder of why you make the choices you do every single day.    Realistic Goals: Yours and Mine  These are some of my own personal (and I feel realistic) daily goals:    To move just as much - or more - than I did the day before  To drink more water than coffee  To give support whenever I seek support  To encourage others whenever I feel discouraged  Notice that not a single one of those goals included a number on the scale. Now, take a few moments and write-out your own goals. Your goals should be SMART. In other words, they need to be specific, measurable, achievable, relevant and timely. Mine meet all of these requirements. They are not fancy, but they’re realistic for me. Yours should be realistic for you.    Now that you’ve set some realistic goals, let’s get real about things you can do every day to make these goals become reality.    Take Your Vitamins. These are real, grown-up, bariatric vitamins. Yes, back in 2001 when I had surgery, I too was told to just take two Flintstones Complete chewables. Guess what? Times have changed. Studies have been done. Multiple societies (American Association of Clinical Endocrinologists, The Obesity Society, American Society for Metabolic and Bariatric Surgery1, 2) have made recommendations regarding the need for long-term vitamin and mineral supplementation after bariatric surgery. Please don’t mess around with this one.    Have Your Labs Drawn at Least Once a Year. These include bariatric labs which are reviewed by someone who knows what the results really mean. Ideally, this should be done by your bariatric surgeon or a bariatrician. At a minimum, the following labs should be checked annually: CMP, Hematocrit, Ferritin, B12, Folic Acid, PTH and Vitamin D 25-Hydroxy Intact.    Drink Tons of Fluids Every Day. Yes, tons - to the point where your urine is barely yellow. This not only prevents dehydration, but  keeps you feeling full, flushes out toxins, prevents urinary and bladder infections and makes nearly everything in your body work better. No, they don’t have to be decaffeinated - unless your healthcare provider says so. And no, they don’t have to be non-carbonated - unless your healthcare provider says so.    Avoid Liquid Calories. I love Starbucks and I know many of you do, too. Can you have a venti white mocha every day and expect not to struggle with weight maintenance? Probably not. Can you have an occasional short or tall latte? Sure. The same scenario goes for other liquid calories such as milk, juice, etc.    Continue to Separate Eating from Drinking. Once you’re a year or more out from surgery, you can drink pretty much up to the time of eating. However, drinking with your meals at any point after surgery will flush food through your pouch quicker. This can affect calorie intake by allowing you to eat more and to not feel full for as long after a meal.  Wait at least 30 minutes  (ideally an hour) after eating to resume drinking so your feeling of fullness stays with you for as long as possible.    Track Your Behavior. You can’t see where you’re going if you don’t know where you’ve been. Please keep some type of food journal. Use an woody, a sticky note or even your phone camera. Even if you only do it a few days every month, it really does help! I promise.    Stop Beating Yourself Up. We only get one life. It’s time to start living it to the fullest every day. Whatever you did or didn’t do yesterday or this morning, it’s over, gone and finished. What matters is this moment right now. Make the best decision for you in this moment and then make another best decision for you in the next moment. Then keep repeating.    Stop Trying to Meet Someone Else’s Ideal. Wear your Spanx if it makes you feel better, or don’t. Put on makeup if it makes you feel more fierce, or don’t. Be selfish. Take 10-15 minutes for  yourself every day. You deserve that and it’s not really being selfish.    Figure Your Mental and Emotional Stuff Out. Will all of the emotional reasons we eat ever be fully resolved? Probably not. Will continuing to stay involved with support groups (true support groups, where you actually find the help you’re seeking) and working with a therapist or counselor help you find alternatives to eating your emotions? Probably so.    Conclusion  Please take note: we’re nearly to the end of our chat and I’ve not given you a list of foods to avoid, a number of calories to eat or a specific meal plan to follow. There are a couple of reasons why. First, if you work on setting those realistic goals that are specific to you and focus on numbers one through nine on the previous page, the food situation tends to take care of itself. However, I still believe in (and more often than not, follow) the recommendation to eat protein first and for half of every meal, and to keep my carb count low.    This is my last suggestion: don’t forget who you are and where you started from. Be a source of support and strength for others. Enjoy your journey because the destination will always be just out of reach. That’s what keeps us moving forward.

## 2024-08-26 NOTE — TELEPHONE ENCOUNTER
Discharge Planning Assessment  [facility]     Patient Name: Grazyna Fritz  MRN: [id number]  Today's Date: 8/26/2024    Admit Date: 8/25/2024    Plan: Home   Discharge Needs Assessment       Row Name 08/26/24 1358       Living Environment    People in Home alone    Current Living Arrangements home    Primary Care Provided by self    Provides Primary Care For no one    Family Caregiver if Needed child(yesica), adult    Quality of Family Relationships helpful;involved;supportive    Able to Return to Prior Arrangements yes       Transition Planning    Patient/Family Anticipates Transition to home    Patient/Family Anticipated Services at Transition none    Transportation Anticipated family or friend will provide       Discharge Needs Assessment    Equipment Currently Used at Home none    Equipment Needed After Discharge none    Provided Post Acute Provider List? N/A    N/A Provider List Comment Denies needs. Plan is home                   Discharge Plan       Row Name 08/26/24 4250       Plan    Plan Home    Plan Comments Introduced self and role of CCP. Facesheet verified. Patient lives alone in a one story house with a finished basement. There are approx 5 steps to enter through the garage and approx 3 steps to enter through the front. Master bedroom, master bathroom and laundry are all located on the main level. Prior to admission, patient was independent with ADL's and continued to drive. Uses no DMEs. Has never used a home health agency nor been to a rehab facility. DC plan is to return home. Stated her son, Yassine will assist as needed. Denies any needs/equipment. Discussed and provided list on In Home Personal Care Agencies.                  Continued Care and Services - Admitted Since 8/25/2024    No active coordination exists for this encounter.          Demographic Summary       Row Name 08/26/24 5948       General Information    Admission Type observation    Arrived From home    Reason for Consult discharge  FYI:  Pt was told to call back if she cannot get in to see cardiologist soon. Dr. Kristina Jackson office cannot get pt in until 10/26.   Dr. Kristina Jackson office said they will call pt within 24 hours b/c pt told them Dr. Betsey Steiner wanted to her be seen soon by card planning    Preferred Language English                   Functional Status       Row Name 08/26/24 1357       Functional Status    Usual Activity Tolerance good    Current Activity Tolerance good       Functional Status, IADL    Medications independent    Meal Preparation independent    Housekeeping independent    Laundry independent    Shopping independent       Mental Status    General Appearance WDL WDL                   Psychosocial       Row Name 08/26/24 1357       Values/Beliefs    Spiritual, Cultural Beliefs, Congregation Practices, Values that Affect Care no       Behavior WDL    Behavior WDL WDL       Speech WDL    Speech WDL WDL       Perceptual State WDL    Perceptual State WDL WDL       Coping/Stress    Sources of Support adult child(yesica)    Reaction to Health Status adjusting    Understanding of Condition and Treatment adequate understanding of treatment       Developmental Stage (Nattyon's)    Developmental Stage Stage 8 (65 years-death/Late Adulthood) Integrity vs. Despair                   Abuse/Neglect       Row Name 08/26/24 1357       Personal Safety    Feels Unsafe at Home or Work/School no    Feels Threatened by Someone no    Does Anyone Try to Keep You From Having Contact with Others or Doing Things Outside Your Home? no    Physical Signs of Abuse Present no               Haily Garcia, RN

## 2024-09-06 ENCOUNTER — OFFICE VISIT (OUTPATIENT)
Facility: CLINIC | Age: 49
End: 2024-09-06
Payer: COMMERCIAL

## 2024-09-06 VITALS
SYSTOLIC BLOOD PRESSURE: 130 MMHG | DIASTOLIC BLOOD PRESSURE: 78 MMHG | HEART RATE: 64 BPM | BODY MASS INDEX: 50 KG/M2 | WEIGHT: 264.81 LBS | HEIGHT: 61 IN

## 2024-09-06 DIAGNOSIS — N92.6 IRREGULAR MENSES: Primary | ICD-10-CM

## 2024-09-06 DIAGNOSIS — N95.1 PERIMENOPAUSE: ICD-10-CM

## 2024-09-06 DIAGNOSIS — D25.2 FIBROIDS, SUBSEROUS: ICD-10-CM

## 2024-09-06 LAB
CONTROL LINE PRESENT WITH A CLEAR BACKGROUND (YES/NO): YES YES/NO
KIT LOT #: NORMAL NUMERIC
PREGNANCY TEST, URINE: NEGATIVE

## 2024-09-06 PROCEDURE — 81025 URINE PREGNANCY TEST: CPT | Performed by: OBSTETRICS & GYNECOLOGY

## 2024-09-06 NOTE — H&P
[facility]  Obstetrics and Gynecology   History & Physical  Est    Chief complaint:   Chief Complaint   Patient presents with    Gyn Problem     -Patient was referred by PCP for hormone check  -Patient reports period stopped for 14 months then came back for 2 months. LMP: 24  -Patient reports the 14 months she was not bleeding but having mild to serve cramping and lower back pain/cramping     Subjective:     HPI: Yelitza Acevedo is a 49 year old  with Patient's last menstrual period was 2024 (exact date).    Here to \"Have hormones checked\" & C/o period stopped for 14 months but then came back for 2 months. During the 14 months that she was not bleeding she was having mild to severe cramping & low back pain/cramping. FSH in 2024 (4 months ago) was 13.8 (pre-menopausal)     Chaperone declines   Last OV 3/10/22 - 2.5 years ago.     PMHx Longstanding anemia, fibroids, morbid obesity (BMI 50), Afib, prediabetes, migraines, elevated CRP & Sed rate, chronic hypertension (21 124/94, 10/25/21 /92,  21 /90 ). Perimenopausal range FSH noted in  with irregular menses since around 2021. Sleep apnea, Chronic shortness of breath. Complains of Coccygeal pain since falling straight on her bottom 2022.     Last OV 3/10/22 - Establish care. Referred by PCP as of 11/15/21 referral in EMR for AUB. Patient scheduled visit to have \"hormones checked\" However she had extensive lab testing done by PCP already in  that showed elevated inflammatory markers, prediabetes, anemia, FSH 33.1 consistent with perimenopause. The actual problem she is having is change in menstrual pattern.     10/25/21 WWE with PCP. C/o periods irregular since 2021 - could be 3 wk apart. Pap & HPV, breast exam done. /92. Labs & pelvic US ordered.     22 Pelvic US per PCP EMS 6 mm. Left fundal fibroid 2.8 cm (previously 3.2 cm), right anterior fibroid 1.7 cm (previously 1.8 cm), ovaries  normal. Trace free fluid in cul de sac.     12/28/21 ESR 42 (H), CRP 0.94 (H)   10/29/21 Hb 11.2, A1c 5.7%, vit D 27.1 (L), TSH 2.28, FSH 33.1, LH 15, Estradiol 24  1/17/22 ED visit - chest pain - Hb 11.0. EKG ok. Troponin x 2 negative.   2/3/22 IC visit - temple/sinus/head pain - Urine preg neg.     C/o menstrual cycles started being irreg. in June, 2 cycles in Sept., Oct, Nov, Dec.    Period in June 2021  No period in July & August 2021  Two periods 2 weeks apart in Sept 2021  Oct 2021- normal period  Nov 2021- normal  Dec - normal period 12/14/21 Jan 2022 no period - Spotting in 1/2022 - just a tiny amount   No period since as of 3/10/22 visit.     Impression AUB with elevated FSH c/w perimenopause & prediabetes. 2/16/22 Pelvic US images reviewed. Uterus bulky, heterogeneous, probable adenomyosis with 2.8 cm subserosal left fundal fibroid, 1.7 cm intramural anterior body fibroid.   Declined endometrial biopsy  Declines hormonal therapy to help with bleeding pattern     As of today, 9/6/24, since last visit    12/10/22 Pelvic US - cramping & right sided abdominal pain x 3 weeks. Irregular menses. Two fibroids 2.8 cm & 1.8 cm (stable from previous). Normal ovaries.     5/10/24 A1c 6.2%, FSH 13.8, LH 15.6, estradiol 114.6 = premenopausal    5/30/24 PCP visit - c/o \"Patient complains of menstrual cramps.  She feels she has not had a period in a long time and states that she gets the cramps without having a flow and feels she has a flow after not having one for over 1 year. \"  Advised gyne for eval.  Advised US pelvis.  Advised PFT and sleep study.  Monitor BP.    7/8/24 Pelvic US - for menstrual cramps, irregular menses   2 uterine fibroids slightly increased in size:  Left anterior fundus 2.9 x 2.9 x 3.6 cm, prior 2.8 x 2.7 x 2.1 cm.    Right posterior fundus 2.5 x 1.2 x 2.1 cm, prior 1.8 x 1.7 x 1.5 cm.   Endometrial stripe measured at 0.6 cm     Today, says she is here for a \"hormone check.\"   Reports she went  14 months without a period but was having cramping during that time.   Was having hot flashes & night sweats  Says she took some herbs - took it for hot flashes & night sweats - took to \"balance things out\"  Thinks that the period came back because of the herbs she took.   Has had 2 periods recently    I told her that her labs are consistent with perimenopause but her bleeding pattern is very odd and should not have gone an entire year without bleeding and then resumed menses.   Recommend endometrial biopsy  She doesn't want to have endometrial biopsy today.   I told her I strongly recommend it & we should have done it 2.5 years ago at the time of her first visit with me for irregular bleeding.   We ended our visit there. I asked her to seek a second opinion elsewhere.     PCP Jenny Santizo, DO   Review of Systems   Genitourinary:  Positive for menstrual problem, pelvic pain and hot flashes.        Hot flashes since her 30s. Stopped in late 30s.   None for awhile.   Then started back in early 40s in little clusters months apart.     As of 3/1022 - Last 6 months more consistent hot flashes. None in the past week. Some night sweats, but not consistent - has been a few weeks. Doesn't need to change to clothes recently but did have to change in her 30s. No vaginal dryness     Menses - see HPI  Cramping - see HPI     Patient's last menstrual period was 06/25/2024 (exact date).    GYN Hx  Menarche 13   Menses q 28 x 5 days x moderate flow. Heavier a little bit with time.   Changes every time going to bathroom for hygiene purpose.s.   Not soaking through clothes  Can make it through the whole night  Sometimes some clotting over the year. Infrequent.   Occasional dysmenorrhea if heavier flow.     +Fibroids.   Endometrial sampling? No     2/16/22 Pelvic US - per PCP EMS 6 mm. Left fundal fibroid 2.8 cm (previously 3.2 cm), right anterior fibroid 1.7 cm (previously 1.8 cm), ovaries normal. Trace free fluid in cul de sac.      3/2022 C/o irregular menses since 2021. Had perimenopausal FSH in .     +Sexually active.  No dyspareunia.     Past contraception - DMPA as a teen. Took for a few years. Did ok on this.   Contraception: vasectomy   STDs: no   HPV vaccine - no     10/25/21 GC/CT neg     H/o slightly abnormal pap some years ago. No biopsies needed, just repeat.   10/25/2021 Pap & HPV neg   10/24/23 Pap & HPV negative - PCP     Mammogram neg 24 (PCP)    Colonoscopy 23 internal hemorrhoids. No polyps. Repeat 10 years. Meera Packer MD     OB History:  OB History    Para Term  AB Living   3 3 3 0 0 3   SAB IAB Ectopic Multiple Live Births   0 0 0 0 3     OB History    Para Term  AB Living   3 3 3     3   SAB IAB Ectopic Multiple Live Births           3      # Outcome Date GA Lbr Yared/2nd Weight Sex Type Anes PTL Lv   3 Term 04 40w0d   M NORMAL SPONT   TU   2 Term 01 40w0d   M NORMAL SPONT   TU   1 Term 10/01/93 40w0d   F NORMAL SPONT   TU       Meds:  Current Outpatient Medications on File Prior to Visit   Medication Sig Dispense Refill    topiramate 100 MG Oral Tab Take 1 tablet (100 mg total) by mouth 2 (two) times daily. 180 tablet 1    metoprolol succinate ER 50 MG Oral Tablet 24 Hr Take 1 tablet (50 mg total) by mouth daily. 90 tablet 1    Omeprazole 40 MG Oral Capsule Delayed Release Take 1 capsule (40 mg total) by mouth daily. 90 capsule 1    ibuprofen 600 MG Oral Tab Take 1 tablet (600 mg total) by mouth every 8 (eight) hours as needed for Pain. 90 tablet 1    JACKIE BERRY OR Take by mouth daily.      NON FORMULARY daily. Soursat      NON FORMULARY daily. moranga      NON FORMULARY daily. Sea chavez      NON FORMULARY daily. vilberry      metoprolol succinate ER 25 MG Oral Tablet 24 Hr Take 2 tablets (50 mg total) by mouth daily.      acetaminophen 500 MG Oral Tab Take 2 tablets (1,000 mg total) by mouth every 6 (six) hours as needed for Pain.      Vitamin D3  50 MCG (2000 UT) Oral Cap Take 1 capsule (2,000 Units total) by mouth daily.      amLODIPine 5 MG Oral Tab Take 1 tablet (5 mg total) by mouth daily. (Patient not taking: Reported on 9/6/2024) 30 tablet 0    ergocalciferol 1.25 MG (62938 UT) Oral Cap Take 1 capsule (50,000 Units total) by mouth once a week. With food for 6 months total.  Recheck lab in july (Patient not taking: Reported on 9/6/2024) 24 capsule 0    Fluticasone Propionate 50 MCG/ACT Nasal Suspension 2 sprays by Nasal route daily. (Patient not taking: Reported on 8/14/2024) 16 g 0     No current facility-administered medications on file prior to visit.       All:  No Known Allergies    PMH:  Past Medical History:    Arrhythmia    Atrial tachycardia (HCC)    Bilateral carpal tunnel syndrome    BPPV (benign paroxysmal positional vertigo)    Chronic hypertension    Edema    Environmental allergies    Fatigue    Fibroids    Ganglion of tendon sheath    GERD (gastroesophageal reflux disease)    Herpes simplex virus (HSV) infection    Low back - had some itching & a rash. Recurred in same area once. No issue for awhile.     History of pelvic ultrasound    2/16/22 Pelvic US - per PCP EMS 6 mm. Lfet fundal fibroid 2.8 cm (previously 3.2 cm), right anterior fibroid 1.7 cm (previously 1.8 cm), ovaries normal. Trace free fluid in cul de sac.     Insulin resistance    Iron deficiency anemia secondary to inadequate dietary iron intake    Migraine without aura    just photophobia    Migraines    Morbid obesity with BMI of 45.0-49.9, adult (HCC)    PAC (premature atrial contraction)    PAF (paroxysmal atrial fibrillation) (HCC)    Pain in joint, lower leg    Palpitations    Pap smear for cervical cancer screening    Pap & HPV negative.     Personal history of urinary calculi    Prediabetes    PVC (premature ventricular contraction)    Screening mammogram for breast cancer    Negative    Shortness of breath    Sleep apnea    Unspecified sinusitis (chronic)    Visual  impairment    glasses    Vitamin D deficiency    Consistently since 2011    Wears glasses    Weight gain       PSH:  Past Surgical History:   Procedure Laterality Date    Colonoscopy N/A 4/6/2023    Procedure: COLONOSCOPY;  Surgeon: Meera Packer MD;  Location:  ENDOSCOPY    Correct bunion,othr methods      Foot/toes surgery proc unlisted      bunion surgery at 16 years old - both        Social History:  Social History     Socioeconomic History    Marital status:      Spouse name: Not on file    Number of children: Not on file    Years of education: Not on file    Highest education level: Not on file   Occupational History    Not on file   Tobacco Use    Smoking status: Never     Passive exposure: Never    Smokeless tobacco: Never   Vaping Use    Vaping status: Never Used   Substance and Sexual Activity    Alcohol use: Yes     Alcohol/week: 0.0 standard drinks of alcohol     Comment: socially    Drug use: No    Sexual activity: Yes     Partners: Male     Birth control/protection: Vasectomy   Other Topics Concern     Service Not Asked    Blood Transfusions Not Asked    Caffeine Concern Yes     Comment: occ pop    Occupational Exposure Not Asked    Hobby Hazards Not Asked    Sleep Concern Not Asked    Stress Concern Not Asked    Weight Concern Not Asked    Special Diet Not Asked    Back Care Not Asked    Exercise No    Bike Helmet Not Asked    Seat Belt Not Asked    Self-Exams Not Asked   Social History Narrative    Not on file     Social Determinants of Health     Financial Resource Strain: Not on file   Food Insecurity: Not on file   Transportation Needs: Not on file   Physical Activity: Not on file   Stress: Not on file   Social Connections: Not on file   Housing Stability: Not on file        Family History:  Family History   Problem Relation Age of Onset    Heart Attack Maternal Grandmother     Diabetes Maternal Grandmother     Hypertension Mother         Mom menopause at 51    Carotid  stenosis Mother     Hypertension Father     Asthma Daughter     No Known Problems Son     No Known Problems Maternal Grandfather     No Known Problems Paternal Grandmother     No Known Problems Paternal Grandfather     Asthma Son     Prostate Cancer Neg     Colon Cancer Neg     Uterine Cancer Neg     Pancreatic Cancer Neg     Ovarian Cancer Neg     Breast Cancer Neg     Endometriosis Neg     Infertility Neg     Thyroid disease Neg     Endometrial Cancer Neg        Objective:     Vitals:    09/06/24 1011   BP: 130/78   Pulse: 64   Weight: 264 lb 12.8 oz (120.1 kg)   Height: 61\"         Body mass index is 50.03 kg/m².    Physical Exam:  Physical Exam   Nursing note and vitals reviewed.   Constitutional: She is obese. She appears well-developed and well-nourished.   HENT:   Head: Normocephalic and atraumatic.   +Facemask   Eyes: Conjunctivae and EOM are normal.   +Glasses   Neurological: She is alert.   Psychiatric: She has a normal mood and affect. Her behavior is normal. Judgment and thought content normal.       Labs:  Lab Results   Component Value Date    WBC 9.7 03/18/2024    RBC 4.64 03/18/2024    HGB 12.5 03/18/2024    HCT 39.6 03/18/2024    MCV 85.3 03/18/2024    MCH 26.9 03/18/2024    MCHC 31.6 03/18/2024    RDW 14.5 03/18/2024    .0 03/18/2024    MPV 11.4 (H) 01/11/2011        Lab Results   Component Value Date    GLU 76 05/10/2024    BUN 13 05/10/2024    BUNCREA 21.1 (H) 01/19/2021    CREATSERUM 0.74 05/10/2024    ANIONGAP 4 05/10/2024     10/24/2017    GFRNAA 110 01/17/2022    GFRAA 127 01/17/2022    CA 9.3 05/10/2024    OSMOCALC 293 05/10/2024    ALKPHO 114 (H) 05/10/2024    AST 12 (L) 05/10/2024    ALT 19 05/10/2024    BILT 0.6 05/10/2024    TP 7.6 05/10/2024    ALB 3.9 05/10/2024    GLOBULIN 3.7 05/10/2024    AGRATIO 1.4 02/12/2015     05/10/2024    K 3.9 05/10/2024     05/10/2024    CO2 29.0 05/10/2024       Lab Results   Component Value Date    CHOLEST 151 05/10/2024    TRIG  56 05/10/2024    HDL 54 05/10/2024    LDL 85 05/10/2024    VLDL 9 05/10/2024    TCHDLRATIO 2.78 10/24/2017    NONHDLC 97 05/10/2024        Lab Results   Component Value Date    T4F 1.1 10/29/2022    TSH 1.740 10/24/2023    TSHT4 1.33 02/12/2015        Lab Results   Component Value Date     (H) 05/10/2024    A1C 6.2 (H) 05/10/2024     Component      Latest Ref Rng 3/18/2024 5/10/2024 6/25/2024   WBC      4.0 - 11.0 x10(3) uL 9.7      RBC      3.80 - 5.30 x10(6)uL 4.64      Hemoglobin      12.0 - 16.0 g/dL 12.5      Hematocrit      35.0 - 48.0 % 39.6      Platelet Count      150.0 - 450.0 10(3)uL 258.0      MCV      80.0 - 100.0 fL 85.3      MCH      26.0 - 34.0 pg 26.9      MCHC      31.0 - 37.0 g/dL 31.6      RDW      % 14.5      Prelim Neutrophil Abs      1.50 - 7.70 x10 (3) uL 5.50      Neutrophils Absolute      1.50 - 7.70 x10(3) uL 5.50      Lymphocytes Absolute      1.00 - 4.00 x10(3) uL 3.50      Monocytes Absolute      0.10 - 1.00 x10(3) uL 0.51      Eosinophils Absolute      0.00 - 0.70 x10(3) uL 0.11      Basophils Absolute      0.00 - 0.20 x10(3) uL 0.03      Immature Granulocyte Absolute      0.00 - 1.00 x10(3) uL 0.02      Neutrophils %      % 56.9      Lymphocytes %      % 36.2      Monocytes %      % 5.3      Eosinophils %      % 1.1      Basophils %      % 0.3      Immature Granulocyte %      % 0.2      Glucose      70 - 99 mg/dL  76     Sodium      136 - 145 mmol/L  142     Potassium      3.5 - 5.1 mmol/L  3.9     Chloride      98 - 112 mmol/L  109     Carbon Dioxide, Total      21.0 - 32.0 mmol/L  29.0     ANION GAP      0 - 18 mmol/L  4     BUN      9 - 23 mg/dL  13     CREATININE      0.55 - 1.02 mg/dL  0.74     CALCIUM      8.5 - 10.1 mg/dL  9.3     CALCULATED OSMOLALITY      275 - 295 mOsm/kg  293     EGFR      >=60 mL/min/1.73m2  101     AST (SGOT)      15 - 37 U/L  12 (L)     ALT (SGPT)      13 - 56 U/L  19     ALKALINE PHOSPHATASE      39 - 100 U/L  114 (H)     Total Bilirubin      0.1  - 2.0 mg/dL  0.6     PROTEIN, TOTAL      6.4 - 8.2 g/dL  7.6     Albumin      3.4 - 5.0 g/dL  3.9     Globulin      2.8 - 4.4 g/dL  3.7     A/G Ratio      1.0 - 2.0   1.1     Patient Fasting for CMP?  Yes     Cholesterol, Total      <200 mg/dL  151     HDL Cholesterol      40 - 59 mg/dL  54     Triglycerides      30 - 149 mg/dL  56     LDL Cholesterol Calc      <100 mg/dL  85     VLDL      0 - 30 mg/dL  9     NON-HDL CHOLESTEROL      <130 mg/dL  97     Patient Fasting for Lipid?  Yes     Alk Phosphatase      44 - 121 IU/L   124 (H)    Liver Frac      18 - 85 %   49    Bone Frac      14 - 68 %   50    Intestinal Frac      0 - 18 %   1    MALB URINE      mg/dL  1.22     CREATININE UR RANDOM      mg/dL  284.00     MALB/CRE CALC      <=30.0 ug/mg  4.3     HEMOGLOBIN A1c      <5.7 %  6.2 (H)     ESTIMATED AVERAGE GLUCOSE      68 - 126 mg/dL  131 (H)     HCG, Serum Qualitative (S)      Negative  Negative      D-Dimer      <0.50 ug/mL FEU 0.58 (H)      Estradiol      No established range for female sex pg/mL  114.6     VITAMIN D, 25-OH, TOTAL      30.0 - 100.0 ng/mL  24.9 (L)     FSH      No established range for female sex mIU/mL  13.8     LH      No established range for female sex mIU/mL  15.6     GAMMA GLUTAMYL TRANSFERASE      5 - 55 U/L   17       Legend:  (H) High  (L) Low    Imaging:    US PELVIS W EV (CPT=76856/88686)  Result Date: 2/16/2022  PROCEDURE:  US PELVIS W EV (CPT=76856,86587)       COMPARISON:  [facility], US PELVIS W EV (CPT=76856/90908), 12/31/2020, 9:54 AM.       INDICATIONS:  N93.9 Abnormal bleeding in menstrual cycle       TECHNIQUE:  Pelvic ultrasound using transabdominal and endovaginal technique.  Transvaginal ultrasound was used to better evaluate adnexal and endometrial detail.       PATIENT STATED HISTORY: (As transcribed by Technologist)  Patient stated that she had spotting a few weeks ago and her last period was 12-.            FINDINGS:                  UTERUS:  8.0 x 6.2 x  5.0 cm.  Endometrial stripe measures 6 mm.  There is a left fundal fibroid measuring 2.8 x 2.8 x 2.8 cm, previously 3.2 x 3.1 x 2.5 cm.  There is a right anterior uterine body fibroid measuring 1.7 x 1.6 x 1.5 cm, previously 1.8 x   1.7 x 1.6 cm.        RIGHT OVARY:  2.41 cm x 0.95 cm x 1.67 cm     The right ovary appears normal in size, shape, and echogenicity. No significant masses are identified.   LEFT OVARY:  2.29 cm x 0.91 cm x 1.44 cm     The left ovary appears normal in size, shape, and echogenicity. No significant masses are identified.   CUL-DE-SAC:  Trace free fluid in the cul de sac                            Impression   CONCLUSION:  Uterine fibroids measuring slightly smaller than on prior ultrasound exam.       Thin endometrial stripe measuring 6 mm.       Trace free fluid in the cul de sac.             Dictated by (CST): Lara Arnold MD on 2/16/2022 at 7:09 PM       Finalized by (CST): Lara Arnold MD on 2/16/2022 at 7:11 PM        Narrative   PROCEDURE:  US PELVIS (TRANSABDOMINAL AND TRANSVAGINAL) (CPT=76856/41629)     COMPARISON:  [facility], CT ABDOMEN+PELVIS(CONTRAST ONLY)(CPT=74177), 11/20/2022, 12:59 PM.     INDICATIONS:  cramoing and pain abdominal area -right side     TECHNIQUE:  Pelvic ultrasound using transabdominal and endovaginal technique.  Transvaginal ultrasound was used to better evaluate adnexal and endometrial detail.     PATIENT STATED HISTORY: (As transcribed by Technologist)  Patient has history of abdominal cramping for 3 weeks, irregular menses.         FINDINGS:                UTERUS:  8.3 x 5.7 x 4.6 cm    Endometrium Thickness: 0.6 cm    Small nodules in uterus measure 2.8 cm left fundus and 1.8 cm right uterine fundus consistent with uterine fibroids.  RIGHT OVARY:  Right ovary measures 2.2 x 1.4 x 1.4 cm and is otherwise unremarkable.  LEFT OVARY:  Left ovary measures 2.2 x 1.1 x 0.8 cm and is otherwise unremarkable.  CUL-DE-SAC:  Normal.  No fluid or mass.     OTHER:  Negative.                        Impression   CONCLUSION:  There are small nodules in uterus consistent with uterine fibroids.  The pelvis is otherwise sonographically unremarkable.        Dictated by (CST): Luis Whalen MD on 12/10/2022 at 11:56 AM      Finalized by (CST): Luis Whalen MD on 12/10/2022 at 11:57 AM       PROCEDURE:  US PELVIS (TRANSABDOMINAL AND TRANSVAGINAL) (CPT=76856/06351)     COMPARISON:  [facility], US PELVIS (TRANSABDOMINAL AND TRANSVAGINAL) (CPT=76856/86998), 12/10/2022, 10:59 AM.     INDICATIONS:  N94.6 Menstrual cramps N92.6 Irregular periods     TECHNIQUE:  Pelvic ultrasound using transabdominal and endovaginal technique.  Transvaginal ultrasound was used to better evaluate adnexal and endometrial detail.     PATIENT STATED HISTORY: (As transcribed by Technologist)           FINDINGS:                UTERUS:  Midline position.  Anteversion.  7.5 x 3.7 x 5.4 cm.  There are 2 masses with similar morphology, circumscribed and hypoechoic consistent with fibroids.  Left anterior fundus 2.9 x 2.9 x 3.6 cm, prior 2.8 x 2.7 x 2.1 cm.  Right posterior fundus  2.5 x 1.2 x 2.1 cm, prior 1.8 x 1.7 x 1.5 cm.  Endometrial stripe measured at 0.6 cm.    RIGHT OVARY:  2.1 x 1.6 x 1.4 cm.  Within the limits of normal.    LEFT OVARY:  1.9 x 1.7 x 1.5 cm.  Within the limits of normal.    CUL-DE-SAC:  No fluid.  OTHER:  None.                      Impression   CONCLUSION:  2 uterine fibroids.  Both have slightly increased in size.        LOCATION:  [facility]        Dictated by (CST): Bob oHlt MD on 2024 at 9:51 AM      Finalized by (CST): Bob Holt MD on 2024 at 9:53 AM       Assessment:     Yelitza Acevedo is a 49 year old  female last office visit 2.5 years ago for AUB. Elevated FSH c/w perimenopause. Patient declined endometrial sampling or hormonal therapy to help with AUB. Two small subserosal fibroids slightly increased in size as of 24. Perimenopausal FSH in  5/2024.     Patient here to \"have her hormones checked.\" She describes no period for 14 months but with cramping during that time. She feels taking some herbs for hot flashes brought her period back on. Has had two periods recently.     Patient is morbidly obese, chronically hypertensive, prediabetic with Afib. She is high risk for endometrial cancer/precancer.     She is declining endometrial sampling. I cannot safely offer her any therapy without ruling out endometrial hyperplasia/endometrial cancer. I advised her to seek a second opinion elsewhere.      Diagnoses and all orders for this visit:    Irregular menses  -     Urine Preg Test [[id number]]    Perimenopause    Fibroids, subserous           Plan:     AUB  -2/16/22 Pelvic US images reviewed. Uterus bulky, heterogeneous, probable adenomyosis with 2.8 cm subserosal left fundal fibroid, 1.7 cm intramural anterior body fibroid. Endometrium 6 mm. Ovaries normal. Trace free fluid in cul de sac.   -mildly anemic, labs otherwise consistent with perimenopause, prediabetes  -3/2022 Discussed she has some risk factors for endometrial hyperplasia & cancer (age, obesity, prediabetes)   -3/2022 endometrial sampling advised. Patient declines endometrial sampling & pelvic exam today. Reviewed if bleeding seems to become very strange, heavy, or more frequent (rather than less frequent)  I would like her to contact us so we can perform endometrial sampling  -3/2022 she declines hormonal therapy to help manage her bleeding pattern  -3/18/24 Hb 12.5  -5/10/24 A1c 6.2%, FSH 13.8, LH 15.6, estradiol 114.6  -7/8/24 Pelvic US - for menstrual cramps, irregular menses. 2 uterine fibroids slightly increased in size:  Left anterior fundus 2.9 x 2.9 x 3.6 cm, prior 2.8 x 2.7 x 2.1 cm.    Right posterior fundus 2.5 x 1.2 x 2.1 cm, prior 1.8 x 1.7 x 1.5 cm.   Endometrial stripe measured at 0.6 cm  -9/5/24 c/o irregular bleeding. Reports went 14 months with no bleeding but was having cramping  during that time then periods resumed x 2 months. Last FSH premenopausal 5/10/24 (about 4 months ago). Images from 7/8/24 US reviewed. LMP 6/25/24 - done cycle day 13. Bulky heterogeneous uterus. Uterine volume 79 mL. Fibroids subserosal, about 2.9 cm & 2.5 cm. Ovaries normal.   -endometrial sampling advised. Patient declines. I cannot safely offer her any therapy/treatment without ruling out endometrial hyperplasia/endometrial cancer. I asked her to seek a second opinion elsewhere.     H/o anemia  -3/18 Hb 12.5    Prediabetes  -10/24/23 A1c 6.1% - per PCP  -5/10/24 A1c 6.2% - per PCP (worsening)     Vit D deficiency  -5/10/24 Vit D 24.9 - per PCP    Pap & HPV neg 10/24/23 (PCP)   Mammogram neg 2/29/24 (PCP)    Colonoscopy  4/6/23 internal hemorrhoids. No polyps. Repeat 10 years. Meera Packer MD   Contraception: vasectomy     Morbid obesity, CHTN, prediabetes, elevated inflammatory markers  -[facility]  -has seen bariatric surgeon  -interested in pursuing surgery.   -encouraged continued weight loss efforts as this should help with inflammation & decrease risk of cancers (breast, uterine, colon, etc)     Advised patient to seek second opinion elsewhere.   No charge visit.     Heydi Cagle MD  [facility] [place] [facility]

## 2024-10-28 ENCOUNTER — LAB ENCOUNTER (OUTPATIENT)
Dept: LAB | Age: 49
End: 2024-10-28
Attending: FAMILY MEDICINE
Payer: COMMERCIAL

## 2024-10-28 DIAGNOSIS — Z13.89 SCREENING FOR GENITOURINARY CONDITION: ICD-10-CM

## 2024-10-28 DIAGNOSIS — E55.9 VITAMIN D DEFICIENCY: ICD-10-CM

## 2024-10-28 DIAGNOSIS — Z00.00 LABORATORY EXAMINATION ORDERED AS PART OF A ROUTINE GENERAL MEDICAL EXAMINATION: ICD-10-CM

## 2024-10-28 LAB
ALBUMIN SERPL-MCNC: 5 G/DL (ref 3.2–4.8)
ALBUMIN/GLOB SERPL: 1.9 {RATIO} (ref 1–2)
ALP LIVER SERPL-CCNC: 117 U/L
ALT SERPL-CCNC: 10 U/L
ANION GAP SERPL CALC-SCNC: 4 MMOL/L (ref 0–18)
AST SERPL-CCNC: 13 U/L (ref ?–34)
BASOPHILS # BLD AUTO: 0.05 X10(3) UL (ref 0–0.2)
BASOPHILS NFR BLD AUTO: 0.6 %
BILIRUB SERPL-MCNC: 0.7 MG/DL (ref 0.3–1.2)
BILIRUB UR QL STRIP.AUTO: NEGATIVE
BUN BLD-MCNC: 10 MG/DL (ref 9–23)
CALCIUM BLD-MCNC: 10.4 MG/DL (ref 8.7–10.4)
CHLORIDE SERPL-SCNC: 108 MMOL/L (ref 98–112)
CHOLEST SERPL-MCNC: 192 MG/DL (ref ?–200)
CLARITY UR REFRACT.AUTO: CLEAR
CO2 SERPL-SCNC: 29 MMOL/L (ref 21–32)
COLOR UR AUTO: YELLOW
CREAT BLD-MCNC: 0.65 MG/DL
EGFRCR SERPLBLD CKD-EPI 2021: 109 ML/MIN/1.73M2 (ref 60–?)
EOSINOPHIL # BLD AUTO: 0.08 X10(3) UL (ref 0–0.7)
EOSINOPHIL NFR BLD AUTO: 1 %
ERYTHROCYTE [DISTWIDTH] IN BLOOD BY AUTOMATED COUNT: 14.1 %
EST. AVERAGE GLUCOSE BLD GHB EST-MCNC: 126 MG/DL (ref 68–126)
FASTING PATIENT LIPID ANSWER: YES
FASTING STATUS PATIENT QL REPORTED: YES
GLOBULIN PLAS-MCNC: 2.7 G/DL (ref 2–3.5)
GLUCOSE BLD-MCNC: 92 MG/DL (ref 70–99)
GLUCOSE UR STRIP.AUTO-MCNC: NORMAL MG/DL
HBA1C MFR BLD: 6 % (ref ?–5.7)
HCT VFR BLD AUTO: 41 %
HDLC SERPL-MCNC: 55 MG/DL (ref 40–59)
HGB BLD-MCNC: 13.1 G/DL
HYALINE CASTS #/AREA URNS AUTO: PRESENT /LPF
IMM GRANULOCYTES # BLD AUTO: 0.02 X10(3) UL (ref 0–1)
IMM GRANULOCYTES NFR BLD: 0.3 %
KETONES UR STRIP.AUTO-MCNC: NEGATIVE MG/DL
LDLC SERPL CALC-MCNC: 124 MG/DL (ref ?–100)
LEUKOCYTE ESTERASE UR QL STRIP.AUTO: 25
LYMPHOCYTES # BLD AUTO: 2.49 X10(3) UL (ref 1–4)
LYMPHOCYTES NFR BLD AUTO: 31.8 %
MCH RBC QN AUTO: 27.5 PG (ref 26–34)
MCHC RBC AUTO-ENTMCNC: 32 G/DL (ref 31–37)
MCV RBC AUTO: 86.1 FL
MONOCYTES # BLD AUTO: 0.53 X10(3) UL (ref 0.1–1)
MONOCYTES NFR BLD AUTO: 6.8 %
NEUTROPHILS # BLD AUTO: 4.65 X10 (3) UL (ref 1.5–7.7)
NEUTROPHILS # BLD AUTO: 4.65 X10(3) UL (ref 1.5–7.7)
NEUTROPHILS NFR BLD AUTO: 59.5 %
NITRITE UR QL STRIP.AUTO: NEGATIVE
NONHDLC SERPL-MCNC: 137 MG/DL (ref ?–130)
OSMOLALITY SERPL CALC.SUM OF ELEC: 291 MOSM/KG (ref 275–295)
PH UR STRIP.AUTO: 6 [PH] (ref 5–8)
PLATELET # BLD AUTO: 262 10(3)UL (ref 150–450)
POTASSIUM SERPL-SCNC: 4.6 MMOL/L (ref 3.5–5.1)
PROT SERPL-MCNC: 7.7 G/DL (ref 5.7–8.2)
PROT UR STRIP.AUTO-MCNC: 20 MG/DL
RBC # BLD AUTO: 4.76 X10(6)UL
RBC UR QL AUTO: NEGATIVE
SODIUM SERPL-SCNC: 141 MMOL/L (ref 136–145)
SP GR UR STRIP.AUTO: 1.03 (ref 1–1.03)
TRIGL SERPL-MCNC: 68 MG/DL (ref 30–149)
TSI SER-ACNC: 2.71 MIU/ML (ref 0.55–4.78)
UROBILINOGEN UR STRIP.AUTO-MCNC: 2 MG/DL
VIT D+METAB SERPL-MCNC: 27.9 NG/ML (ref 30–100)
VLDLC SERPL CALC-MCNC: 12 MG/DL (ref 0–30)
WBC # BLD AUTO: 7.8 X10(3) UL (ref 4–11)

## 2024-10-28 PROCEDURE — 36415 COLL VENOUS BLD VENIPUNCTURE: CPT

## 2024-10-28 PROCEDURE — 85025 COMPLETE CBC W/AUTO DIFF WBC: CPT

## 2024-10-28 PROCEDURE — 83036 HEMOGLOBIN GLYCOSYLATED A1C: CPT

## 2024-10-28 PROCEDURE — 84443 ASSAY THYROID STIM HORMONE: CPT

## 2024-10-28 PROCEDURE — 80053 COMPREHEN METABOLIC PANEL: CPT

## 2024-10-28 PROCEDURE — 82306 VITAMIN D 25 HYDROXY: CPT

## 2024-10-28 PROCEDURE — 80061 LIPID PANEL: CPT

## 2024-10-28 PROCEDURE — 81001 URINALYSIS AUTO W/SCOPE: CPT

## 2024-10-29 RX ORDER — NAPROXEN 500 MG/1
TABLET ORAL
COMMUNITY
Start: 2022-06-19

## 2024-10-29 RX ORDER — METOCLOPRAMIDE 10 MG/1
TABLET ORAL
COMMUNITY
Start: 2022-06-19

## 2024-10-30 ENCOUNTER — OFFICE VISIT (OUTPATIENT)
Dept: FAMILY MEDICINE CLINIC | Facility: CLINIC | Age: 49
End: 2024-10-30
Payer: COMMERCIAL

## 2024-10-30 VITALS
BODY MASS INDEX: 48.03 KG/M2 | WEIGHT: 261 LBS | HEIGHT: 62 IN | DIASTOLIC BLOOD PRESSURE: 78 MMHG | OXYGEN SATURATION: 98 % | HEART RATE: 66 BPM | SYSTOLIC BLOOD PRESSURE: 120 MMHG | RESPIRATION RATE: 16 BRPM

## 2024-10-30 DIAGNOSIS — R82.90 ABNORMAL URINE: ICD-10-CM

## 2024-10-30 DIAGNOSIS — Z12.31 ENCOUNTER FOR SCREENING MAMMOGRAM FOR MALIGNANT NEOPLASM OF BREAST: ICD-10-CM

## 2024-10-30 DIAGNOSIS — Z00.00 ROUTINE GENERAL MEDICAL EXAMINATION AT A HEALTH CARE FACILITY: Primary | ICD-10-CM

## 2024-10-30 DIAGNOSIS — R73.03 PREDIABETES: ICD-10-CM

## 2024-10-30 DIAGNOSIS — E01.0 THYROMEGALY: ICD-10-CM

## 2024-10-30 PROCEDURE — 87624 HPV HI-RISK TYP POOLED RSLT: CPT | Performed by: FAMILY MEDICINE

## 2024-10-30 PROCEDURE — 88175 CYTOPATH C/V AUTO FLUID REDO: CPT | Performed by: FAMILY MEDICINE

## 2024-10-30 PROCEDURE — 99396 PREV VISIT EST AGE 40-64: CPT | Performed by: FAMILY MEDICINE

## 2024-10-30 NOTE — H&P
CC: Annual Physical Exam    HPI:   Yelitza Acevedo is a 49 year old female who presents for a complete physical exam. Symptoms: periods are regular. Patient complains of nothing.       Wt Readings from Last 6 Encounters:   10/30/24 261 lb (118.4 kg)   09/06/24 264 lb 12.8 oz (120.1 kg)   08/14/24 257 lb (116.6 kg)   07/16/24 264 lb (119.7 kg)   05/30/24 266 lb (120.7 kg)   04/17/24 265 lb (120.2 kg)     Body mass index is 47.74 kg/m².     Results for orders placed or performed in visit on 10/28/24   Vitamin D, 25-Hydroxy    Collection Time: 10/28/24 10:20 AM   Result Value Ref Range    Vitamin D, 25OH, Total 27.9 (L) 30.0 - 100.0 ng/mL   Comp Metabolic Panel (14) [E]    Collection Time: 10/28/24 10:20 AM   Result Value Ref Range    Glucose 92 70 - 99 mg/dL    Sodium 141 136 - 145 mmol/L    Potassium 4.6 3.5 - 5.1 mmol/L    Chloride 108 98 - 112 mmol/L    CO2 29.0 21.0 - 32.0 mmol/L    Anion Gap 4 0 - 18 mmol/L    BUN 10 9 - 23 mg/dL    Creatinine 0.65 0.55 - 1.02 mg/dL    Calcium, Total 10.4 8.7 - 10.4 mg/dL    Calculated Osmolality 291 275 - 295 mOsm/kg    eGFR-Cr 109 >=60 mL/min/1.73m2    AST 13 <34 U/L    ALT 10 10 - 49 U/L    Alkaline Phosphatase 117 (H) 39 - 100 U/L    Bilirubin, Total 0.7 0.3 - 1.2 mg/dL    Total Protein 7.7 5.7 - 8.2 g/dL    Albumin 5.0 (H) 3.2 - 4.8 g/dL    Globulin  2.7 2.0 - 3.5 g/dL    A/G Ratio 1.9 1.0 - 2.0    Patient Fasting for CMP? Yes    Lipid Panel [E]    Collection Time: 10/28/24 10:20 AM   Result Value Ref Range    Cholesterol, Total 192 <200 mg/dL    HDL Cholesterol 55 40 - 59 mg/dL    Triglycerides 68 30 - 149 mg/dL    LDL Cholesterol 124 (H) <100 mg/dL    VLDL 12 0 - 30 mg/dL    Non HDL Chol 137 (H) <130 mg/dL    Patient Fasting for Lipid? Yes    Hemoglobin A1C [E]    Collection Time: 10/28/24 10:20 AM   Result Value Ref Range    HgbA1C 6.0 (H) <5.7 %    Estimated Average Glucose 126 68 - 126 mg/dL   CBC With Differential With Platelet    Collection Time: 10/28/24 10:20 AM    Result Value Ref Range    WBC 7.8 4.0 - 11.0 x10(3) uL    RBC 4.76 3.80 - 5.30 x10(6)uL    HGB 13.1 12.0 - 16.0 g/dL    HCT 41.0 35.0 - 48.0 %    .0 150.0 - 450.0 10(3)uL    MCV 86.1 80.0 - 100.0 fL    MCH 27.5 26.0 - 34.0 pg    MCHC 32.0 31.0 - 37.0 g/dL    RDW 14.1 %    Neutrophil Absolute Prelim 4.65 1.50 - 7.70 x10 (3) uL    Neutrophil Absolute 4.65 1.50 - 7.70 x10(3) uL    Lymphocyte Absolute 2.49 1.00 - 4.00 x10(3) uL    Monocyte Absolute 0.53 0.10 - 1.00 x10(3) uL    Eosinophil Absolute 0.08 0.00 - 0.70 x10(3) uL    Basophil Absolute 0.05 0.00 - 0.20 x10(3) uL    Immature Granulocyte Absolute 0.02 0.00 - 1.00 x10(3) uL    Neutrophil % 59.5 %    Lymphocyte % 31.8 %    Monocyte % 6.8 %    Eosinophil % 1.0 %    Basophil % 0.6 %    Immature Granulocyte % 0.3 %   TSH W Reflex To Free T4    Collection Time: 10/28/24 10:20 AM   Result Value Ref Range    TSH 2.712 0.550 - 4.780 mIU/mL   Urinalysis, Routine    Collection Time: 10/28/24 10:20 AM   Result Value Ref Range    Urine Color Yellow Yellow    Clarity Urine Clear Clear    Spec Gravity 1.029 1.005 - 1.030    Glucose Urine Normal Normal mg/dL    Bilirubin Urine Negative Negative    Ketones Urine Negative Negative mg/dL    Blood Urine Negative Negative    pH Urine 6.0 5.0 - 8.0    Protein Urine 20 (A) Negative mg/dL    Urobilinogen Urine 2 (A) Normal mg/dL    Nitrite Urine Negative Negative    Leukocyte Esterase Urine 25 (A) Negative    WBC Urine 1-5 0 - 5 /HPF    RBC Urine 0-2 0 - 2 /HPF    Bacteria Urine None Seen None Seen /HPF    Squamous Epi. Cells Few (A) None Seen /HPF    Renal Tubular Epithelial Cells None Seen None Seen /HPF    Transitional Cells None Seen None Seen /HPF    Hyaline Casts Present (A) None Seen /LPF    Yeast Urine None Seen None Seen /HPF       Current Outpatient Medications   Medication Sig Dispense Refill    metoclopramide 10 MG Oral Tab       naproxen 500 MG Oral Tab       topiramate 100 MG Oral Tab Take 1 tablet (100 mg total)  by mouth 2 (two) times daily. 180 tablet 1    metoprolol succinate ER 50 MG Oral Tablet 24 Hr Take 1 tablet (50 mg total) by mouth daily. 90 tablet 1    Omeprazole 40 MG Oral Capsule Delayed Release Take 1 capsule (40 mg total) by mouth daily. 90 capsule 1    ibuprofen 600 MG Oral Tab Take 1 tablet (600 mg total) by mouth every 8 (eight) hours as needed for Pain. 90 tablet 1    JACKIE BERRY OR Take by mouth daily.      NON FORMULARY daily. Soursat      NON FORMULARY daily. moranga      NON FORMULARY daily. Sea chavez      NON FORMULARY daily. vilberry      metoprolol succinate ER 25 MG Oral Tablet 24 Hr Take 2 tablets (50 mg total) by mouth daily.      acetaminophen 500 MG Oral Tab Take 2 tablets (1,000 mg total) by mouth every 6 (six) hours as needed for Pain.      Vitamin D3 50 MCG (2000 UT) Oral Cap Take 1 capsule (2,000 Units total) by mouth daily.        No Known Allergies   Past Medical History:    Arrhythmia    Atrial tachycardia (HCC)    Bilateral carpal tunnel syndrome    BPPV (benign paroxysmal positional vertigo)    Chronic hypertension    Edema    Environmental allergies    Fatigue    Fibroids    Ganglion of tendon sheath    GERD (gastroesophageal reflux disease)    Herpes simplex virus (HSV) infection    Low back - had some itching & a rash. Recurred in same area once. No issue for awhile.     History of pelvic ultrasound    2/16/22 Pelvic US - per PCP EMS 6 mm. Lfet fundal fibroid 2.8 cm (previously 3.2 cm), right anterior fibroid 1.7 cm (previously 1.8 cm), ovaries normal. Trace free fluid in cul de sac.     Insulin resistance    Iron deficiency anemia secondary to inadequate dietary iron intake    Migraine without aura    just photophobia    Migraines    Morbid obesity with BMI of 45.0-49.9, adult (HCC)    PAC (premature atrial contraction)    PAF (paroxysmal atrial fibrillation) (HCC)    Pain in joint, lower leg    Palpitations    Pap smear for cervical cancer screening    Pap & HPV negative.      Personal history of urinary calculi    Prediabetes    PVC (premature ventricular contraction)    Screening mammogram for breast cancer    Negative    Shortness of breath    Sleep apnea    Unspecified sinusitis (chronic)    Visual impairment    glasses    Vitamin D deficiency    Consistently since 2011    Wears glasses    Weight gain      Past Surgical History:   Procedure Laterality Date    Colonoscopy N/A 4/6/2023    Procedure: COLONOSCOPY;  Surgeon: Meera Packer MD;  Location:  ENDOSCOPY    Correct bunion,othr methods      Foot/toes surgery proc unlisted      bunion surgery at 16 years old - both       Family History   Problem Relation Age of Onset    Heart Attack Maternal Grandmother     Diabetes Maternal Grandmother     Hypertension Mother         Mom menopause at 51    Carotid stenosis Mother     Hypertension Father     Asthma Daughter     No Known Problems Son     No Known Problems Maternal Grandfather     No Known Problems Paternal Grandmother     No Known Problems Paternal Grandfather     Asthma Son     Prostate Cancer Neg     Colon Cancer Neg     Uterine Cancer Neg     Pancreatic Cancer Neg     Ovarian Cancer Neg     Breast Cancer Neg     Endometriosis Neg     Infertility Neg     Thyroid disease Neg     Endometrial Cancer Neg       Social History:   Social History     Socioeconomic History    Marital status:    Tobacco Use    Smoking status: Never     Passive exposure: Never    Smokeless tobacco: Never   Vaping Use    Vaping status: Never Used   Substance and Sexual Activity    Alcohol use: Yes     Alcohol/week: 0.0 standard drinks of alcohol     Comment: socially    Drug use: No    Sexual activity: Yes     Partners: Male     Birth control/protection: Vasectomy   Other Topics Concern    Caffeine Concern Yes     Comment: occ pop    Exercise No     Occ: . : y. Children: 3.   Exercise: none.  Diet: watches fats closely, watches sugar closely and watches calories closely      REVIEW OF SYSTEMS:   GENERAL: feels well otherwise  SKIN: denies any unusual skin lesions  EYES:denies blurred vision or double vision  HEENT: denies nasal congestion, sinus pain or ST  LUNGS: denies shortness of breath with exertion  CARDIOVASCULAR: denies chest pain on exertion  GI: denies abdominal pain,denies heartburn  : denies dysuria, vaginal discharge or itching,periods irregular -- last period 3/2023 and spotted one days last week  MUSCULOSKELETAL: denies back pain  NEURO: denies headaches  PSYCHE: denies depression or anxiety  HEMATOLOGIC: hx of anemia  ENDOCRINE: denies thyroid history  ALL/ASTHMA: denies hx of asthma; allergies    EXAM:   /78 (BP Location: Left arm, Patient Position: Sitting, Cuff Size: adult)   Pulse 66   Resp 16   Ht 5' 2\" (1.575 m)   Wt 261 lb (118.4 kg)   LMP 06/25/2024 (Exact Date)   SpO2 98%   BMI 47.74 kg/m²   Body mass index is 47.74 kg/m².   GENERAL: well developed, well nourished,in no apparent distress  SKIN: no rashes,no suspicious lesions  HEENT: atraumatic, normocephalic,ears and throat clear  EYES:EOMI, conjunctiva are clear  NECK: supple,no adenopathy,no bruits; no thyromegaly  CHEST: no chest tenderness  BREAST: no dominant or suspicious mass, no nipple discharge  LUNGS: clear to auscultation  CARDIO: RRR without murmur  GI: good BS's,no masses, HSM or tenderness  :introitus is normal,scant discharge,cervix is pink,no adnexal masses or tenderness, PAP was done; weak keagle  MUSCULOSKELETAL: back is not tender,FROM of the back  EXTREMITIES: no cyanosis, clubbing or edema  NEURO: Oriented times three,cranial nerves are intact,motor and sensory are grossly intact    ASSESSMENT AND PLAN:   Yelitza Acevedo is a 49 year old female who presents for a complete physical exam.   Pap and pelvic done.   Order put in for mammogram.  Last eye exam -- June 2023  Last dental exam -- 10/2024  Deferred flu shot today.   Advised COVID.  Pt. deferred  Self breast  exam explained.   Colonoscopy due 4/2033.      Health maintenance, will check fasting Lipids, CMP, CBC, and vitamin D.   Labs reviewed.  Pt' s weight is Body mass index is 47.74 kg/m²., recommended low fat diet and aerobic exercise 30 minutes three times weekly.    The patient indicates understanding of these issues and agrees to the plan.  The patient is asked to return in Return in about 6 months (around 4/30/2025) for med check 30.      Encounter Diagnoses   Name Primary?    Routine general medical examination at a health care facility Yes    Prediabetes     Encounter for screening mammogram for malignant neoplasm of breast     Thyromegaly     Abnormal urine        Orders Placed This Encounter   Procedures    UA/M WITH CULTURE REFLEX [3020]    ThinPrep PAP with HPV Reflex Request       Meds & Refills for this Visit:  Requested Prescriptions      No prescriptions requested or ordered in this encounter       Imaging & Consults:  [facility] TORRI 2D+3D SCREENING BILAT (CPT=77067/03917)  US THYROID (OEZ=76787)

## 2024-10-31 ENCOUNTER — TELEPHONE (OUTPATIENT)
Dept: FAMILY MEDICINE CLINIC | Facility: CLINIC | Age: 49
End: 2024-10-31

## 2024-11-05 LAB
.: NORMAL
.: NORMAL

## 2024-11-06 LAB — HPV E6+E7 MRNA CVX QL NAA+PROBE: NEGATIVE

## 2024-11-21 ENCOUNTER — OFFICE VISIT (OUTPATIENT)
Dept: FAMILY MEDICINE CLINIC | Facility: CLINIC | Age: 49
End: 2024-11-21
Payer: COMMERCIAL

## 2024-11-21 VITALS
HEIGHT: 62 IN | BODY MASS INDEX: 48.21 KG/M2 | HEART RATE: 75 BPM | WEIGHT: 262 LBS | DIASTOLIC BLOOD PRESSURE: 76 MMHG | RESPIRATION RATE: 18 BRPM | OXYGEN SATURATION: 99 % | SYSTOLIC BLOOD PRESSURE: 120 MMHG

## 2024-11-21 DIAGNOSIS — M54.42 ACUTE LEFT-SIDED LOW BACK PAIN WITH LEFT-SIDED SCIATICA: Primary | ICD-10-CM

## 2024-11-21 PROCEDURE — 99213 OFFICE O/P EST LOW 20 MIN: CPT

## 2024-11-21 RX ORDER — CYCLOBENZAPRINE HCL 10 MG
10 TABLET ORAL 3 TIMES DAILY PRN
Qty: 30 TABLET | Refills: 1 | Status: SHIPPED | OUTPATIENT
Start: 2024-11-21

## 2024-11-21 NOTE — PATIENT INSTRUCTIONS
Take ibuprofen for anti-inflammatory.     Take cyclobenzaprine for the muscle spasms.     Drink plenty of water.     I will get ahold of you when I get the Xray results.     If the xray is mostly normal, and you still have any pain after the weekend, we can do physical therapy.     Let me know if you need anything.

## 2024-11-21 NOTE — PROGRESS NOTES
Subjective:   Yelitza Acevedo is a 49 year old female who presents for Back Pain (Pain starts in the middle of lower back and goes up and sometimes going to groin area also. Pain started yesterday morning and is sharp and shooting.)     Onset was yesterday. Did not have when she woke up. Around 9:30 am, got up to walk and it was sharp, shooting pain on L side. Feels sitting or standing. Only had this one other time a couple of years ago. That time, resolved on its own.     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 Does not need to pick them up often.     In April 2022, fell and sometimes has low back right-sided issues. Sometimes achy on that side. This is different than that.       History/Other:    Chief Complaint Reviewed and Verified  Nursing Notes Reviewed and   Verified  Tobacco Reviewed  Allergies Reviewed  Medications Reviewed    Problem List Reviewed  Medical History Reviewed  Surgical History   Reviewed  Family History Reviewed         Tobacco:  She has never smoked tobacco.    Current Outpatient Medications   Medication Sig Dispense Refill    cyclobenzaprine 10 MG Oral Tab Take 1 tablet (10 mg total) by mouth 3 (three) times daily as needed for Muscle spasms. 30 tablet 1    metoclopramide 10 MG Oral Tab       naproxen 500 MG Oral Tab       topiramate 100 MG Oral Tab Take 1 tablet (100 mg total) by mouth 2 (two) times daily. 180 tablet 1    metoprolol succinate ER 50 MG Oral Tablet 24 Hr Take 1 tablet (50 mg total) by mouth daily. 90 tablet 1    Omeprazole 40 MG Oral Capsule Delayed Release Take 1 capsule (40 mg total) by mouth daily. 90 capsule 1    ibuprofen 600 MG Oral Tab Take 1 tablet (600 mg total) by mouth every 8 (eight) hours as needed for Pain. 90 tablet 1    JACKIE BERRY OR Take by mouth daily.      NON FORMULARY daily. Soursat      NON FORMULARY daily. moranga      NON FORMULARY daily. Sea chavez      NON FORMULARY daily. vilberry      metoprolol succinate ER  25 MG Oral Tablet 24 Hr Take 2 tablets (50 mg total) by mouth daily.      acetaminophen 500 MG Oral Tab Take 2 tablets (1,000 mg total) by mouth every 6 (six) hours as needed for Pain.      Vitamin D3 50 MCG (2000 UT) Oral Cap Take 1 capsule (2,000 Units total) by mouth daily.           Review of Systems:  Review of Systems   Musculoskeletal:  Positive for back pain and myalgias.   All other systems reviewed and are negative.    Denies loss of stool or urine.   No trauma or injury to area.     Objective:   /76 (BP Location: Left arm, Patient Position: Sitting, Cuff Size: adult)   Pulse 75   Resp 18   Ht 5' 2\" (1.575 m)   Wt 262 lb (118.8 kg)   LMP 06/25/2024 (Exact Date)   SpO2 99%   BMI 47.92 kg/m²  Estimated body mass index is 47.92 kg/m² as calculated from the following:    Height as of this encounter: 5' 2\" (1.575 m).    Weight as of this encounter: 262 lb (118.8 kg).  Physical Exam  Vitals reviewed.   Constitutional:       General: She is not in acute distress.     Appearance: Normal appearance. She is not ill-appearing, toxic-appearing or diaphoretic.   HENT:      Head: Normocephalic and atraumatic.   Eyes:      General: No scleral icterus.        Right eye: No discharge.         Left eye: No discharge.      Conjunctiva/sclera: Conjunctivae normal.   Cardiovascular:      Rate and Rhythm: Normal rate and regular rhythm.      Pulses: Normal pulses.      Heart sounds: Normal heart sounds.   Pulmonary:      Effort: Pulmonary effort is normal.      Breath sounds: Normal breath sounds.   Musculoskeletal:         General: Tenderness present. No swelling, deformity or signs of injury.      Cervical back: Normal range of motion.      Right lower leg: No edema.      Left lower leg: No edema.      Comments: Tenderness palpated over left sciatic area. Pain radiates upward to lower back. No pain on R side.    Skin:     General: Skin is warm and dry.   Neurological:      General: No focal deficit present.       Mental Status: She is alert and oriented to person, place, and time.   Psychiatric:         Mood and Affect: Mood normal.         Behavior: Behavior normal.         Thought Content: Thought content normal.         Judgment: Judgment normal.         Assessment & Plan:   1. Acute left-sided low back pain with left-sided sciatica (Primary)  -     XR LUMBAR SPINE COMPLETE W/FLEX + EXT (CPT=72114); Future; Expected date: 11/21/2024  Other orders  -     Cyclobenzaprine HCl; Take 1 tablet (10 mg total) by mouth 3 (three) times daily as needed for Muscle spasms.  Dispense: 30 tablet; Refill: 1    Take ibuprofen for anti-inflammatory.     Take cyclobenzaprine for the muscle spasms.     Drink plenty of water.     I will get ahold of you when I get the Xray results.     If the xray is mostly normal, and you still have any pain after the weekend, we can do physical therapy.           Return in about 3 days (around 11/24/2024).    SHAYY Cortez, 11/21/2024, 9:43 AM

## 2024-11-22 ENCOUNTER — HOSPITAL ENCOUNTER (OUTPATIENT)
Dept: GENERAL RADIOLOGY | Age: 49
Discharge: HOME | End: 2024-11-22
Payer: COMMERCIAL

## 2024-11-22 DIAGNOSIS — M54.42 ACUTE LEFT-SIDED LOW BACK PAIN WITH LEFT-SIDED SCIATICA: ICD-10-CM

## 2024-11-22 PROCEDURE — 72114 X-RAY EXAM L-S SPINE BENDING: CPT

## 2024-12-30 ENCOUNTER — HOSPITAL ENCOUNTER (OUTPATIENT)
Dept: ULTRASOUND IMAGING | Age: 49
Discharge: HOME | End: 2024-12-30
Attending: FAMILY MEDICINE
Payer: COMMERCIAL

## 2024-12-30 DIAGNOSIS — E01.0 THYROMEGALY: ICD-10-CM

## 2024-12-30 PROCEDURE — 76536 US EXAM OF HEAD AND NECK: CPT | Performed by: FAMILY MEDICINE

## 2024-12-31 NOTE — PROGRESS NOTES
Dear Yelitza,    You have stable thyroid nodules.  I recommend repeating the ultrasound in 1 year.    Sincerely,  Dr. Santizo

## 2025-02-03 ENCOUNTER — OFFICE VISIT (OUTPATIENT)
Dept: FAMILY MEDICINE CLINIC | Facility: CLINIC | Age: 50
End: 2025-02-03
Payer: COMMERCIAL

## 2025-02-03 VITALS
OXYGEN SATURATION: 99 % | BODY MASS INDEX: 49.69 KG/M2 | HEIGHT: 62 IN | SYSTOLIC BLOOD PRESSURE: 120 MMHG | DIASTOLIC BLOOD PRESSURE: 86 MMHG | HEART RATE: 76 BPM | WEIGHT: 270 LBS | RESPIRATION RATE: 18 BRPM

## 2025-02-03 DIAGNOSIS — R10.9 ABDOMINAL DISCOMFORT: Primary | ICD-10-CM

## 2025-02-03 PROCEDURE — 99213 OFFICE O/P EST LOW 20 MIN: CPT | Performed by: STUDENT IN AN ORGANIZED HEALTH CARE EDUCATION/TRAINING PROGRAM

## 2025-02-03 NOTE — PROGRESS NOTES
[facility] Family Medicine Office Note    HPI:     Yelitza Acevedo is a 49 year old female presenting for abdominal pain.     She has noted bloating, bubbling discomfort in abdomen that started a few weeks ago. Occurs sometimes after eating but sometimes can happen in morning or middle of the night. Sometimes when it occurs patient has to go to bathroom immediately after. Last night it occurred after salmon and spinach. Endorses hx of acid reflux but states this has been relatively well controlled on omeprazole.  Has noted change in her bowel movements from baseline, can alternate between runny consistency to more of a mushy quality. Last couple of days more constipation. Has noted improvement in abdominal discomfort after BM. She states last October/November symptoms like this would occur with cheesy or saucy foods but not so much noting a pattern currently. She states she has taken pepto bismol at times for these symptoms.     HISTORY:  Past Medical History:    Arrhythmia    Atrial tachycardia (HCC)    Bilateral carpal tunnel syndrome    BPPV (benign paroxysmal positional vertigo)    Chronic hypertension    Edema    Environmental allergies    Fatigue    Fibroids    Ganglion of tendon sheath    GERD (gastroesophageal reflux disease)    Herpes simplex virus (HSV) infection    Low back - had some itching & a rash. Recurred in same area once. No issue for awhile.     History of pelvic ultrasound    2/16/22 Pelvic US - per PCP EMS 6 mm. Lfet fundal fibroid 2.8 cm (previously 3.2 cm), right anterior fibroid 1.7 cm (previously 1.8 cm), ovaries normal. Trace free fluid in cul de sac.     Insulin resistance    Iron deficiency anemia secondary to inadequate dietary iron intake    Migraine without aura    just photophobia    Migraines    Morbid obesity with BMI of 45.0-49.9, adult (HCC)    PAC (premature atrial contraction)    PAF (paroxysmal atrial fibrillation) (HCC)    Pain in joint, lower leg     Palpitations    Pap smear for cervical cancer screening    Pap & HPV negative.     Personal history of urinary calculi    Prediabetes    PVC (premature ventricular contraction)    Screening mammogram for breast cancer    Negative    Shortness of breath    Sleep apnea    Unspecified sinusitis (chronic)    Visual impairment    glasses    Vitamin D deficiency    Consistently since 2011    Wears glasses    Weight gain      Past Surgical History:   Procedure Laterality Date    Colonoscopy N/A 4/6/2023    Procedure: COLONOSCOPY;  Surgeon: Meera Packer MD;  Location:  ENDOSCOPY    Correct bunion,othr methods      Foot/toes surgery proc unlisted      bunion surgery at 16 years old - both       Family History   Problem Relation Age of Onset    Heart Attack Maternal Grandmother     Diabetes Maternal Grandmother     Hypertension Mother         Mom menopause at 51    Carotid stenosis Mother     Hypertension Father     Asthma Daughter     No Known Problems Son     No Known Problems Maternal Grandfather     No Known Problems Paternal Grandmother     No Known Problems Paternal Grandfather     Asthma Son     Prostate Cancer Neg     Colon Cancer Neg     Uterine Cancer Neg     Pancreatic Cancer Neg     Ovarian Cancer Neg     Breast Cancer Neg     Endometriosis Neg     Infertility Neg     Thyroid disease Neg     Endometrial Cancer Neg       Social History:   Social History     Socioeconomic History    Marital status:    Tobacco Use    Smoking status: Never     Passive exposure: Never    Smokeless tobacco: Never   Vaping Use    Vaping status: Never Used   Substance and Sexual Activity    Alcohol use: Yes     Alcohol/week: 0.0 standard drinks of alcohol     Comment: socially    Drug use: No    Sexual activity: Yes     Partners: Male     Birth control/protection: Vasectomy   Other Topics Concern    Caffeine Concern Yes     Comment: occ pop    Exercise No        Medications (Active prior to today's visit):  Current  Outpatient Medications   Medication Sig Dispense Refill    cyclobenzaprine 10 MG Oral Tab Take 1 tablet (10 mg total) by mouth 3 (three) times daily as needed for Muscle spasms. 30 tablet 1    metoclopramide 10 MG Oral Tab       naproxen 500 MG Oral Tab       topiramate 100 MG Oral Tab Take 1 tablet (100 mg total) by mouth 2 (two) times daily. 180 tablet 1    metoprolol succinate ER 50 MG Oral Tablet 24 Hr Take 1 tablet (50 mg total) by mouth daily. 90 tablet 1    Omeprazole 40 MG Oral Capsule Delayed Release Take 1 capsule (40 mg total) by mouth daily. 90 capsule 1    ibuprofen 600 MG Oral Tab Take 1 tablet (600 mg total) by mouth every 8 (eight) hours as needed for Pain. 90 tablet 1    JACKIE BERRY OR Take by mouth daily.      NON FORMULARY daily. Soursat      NON FORMULARY daily. moranga      NON FORMULARY daily. Sea chavez      NON FORMULARY daily. vilberry      metoprolol succinate ER 25 MG Oral Tablet 24 Hr Take 2 tablets (50 mg total) by mouth daily.      acetaminophen 500 MG Oral Tab Take 2 tablets (1,000 mg total) by mouth every 6 (six) hours as needed for Pain.      Vitamin D3 50 MCG (2000 UT) Oral Cap Take 1 capsule (2,000 Units total) by mouth daily.         Allergies:  Allergies[1]      ROS:   Review of Systems   Gastrointestinal:         +abdominal discomfort     Otherwise see HPI    PHYSICAL EXAM:   /86 (BP Location: Left arm, Patient Position: Sitting, Cuff Size: adult)   Pulse 76   Resp 18   Ht 5' 2\" (1.575 m)   Wt 270 lb (122.5 kg)   LMP 06/25/2024 (Exact Date)   SpO2 99%   BMI 49.38 kg/m²  Estimated body mass index is 49.38 kg/m² as calculated from the following:    Height as of this encounter: 5' 2\" (1.575 m).    Weight as of this encounter: 270 lb (122.5 kg).   Vital signs reviewed.Appears stated age, well groomed.    Physical Exam  Constitutional:       General: She is not in acute distress.  Abdominal:      General: Bowel sounds are normal.      Palpations: Abdomen is soft.       Tenderness: There is no abdominal tenderness. There is no guarding or rebound.   Neurological:      Mental Status: She is alert.           ASSESSMENT/PLAN:     49 year old female presenting for abdominal discomfort, consider IBS.     1. Abdominal discomfort  - provided info on low FODMAP diet  - if no improvement after 3-4 weeks can order abdominal x-ray and prescribe dicyclomine   - also advised patient to keep a food diary     Follow-up: as needed    Outcome: Patient verbalizes understanding. Patient is notified to call with any questions, complications, allergies, or worsening or changing symptoms.  Patient is to call with any side effects or complications from the treatments as a result of today.     Total length of visit/charting: approximately 20 min    Freddy Bass MD, 02/03/25, 9:39 AM      Please note that portions of this note may have been completed with a voice recognition program. Efforts were made to edit the dictations but occasionally words are mis-transcribed.       [1] No Known Allergies

## 2025-02-19 DIAGNOSIS — E66.01 MORBID OBESITY (HCC): ICD-10-CM

## 2025-02-19 DIAGNOSIS — G43.109 MIGRAINE WITH AURA AND WITHOUT STATUS MIGRAINOSUS, NOT INTRACTABLE: ICD-10-CM

## 2025-02-19 RX ORDER — TOPIRAMATE 50 MG/1
50 TABLET, FILM COATED ORAL NIGHTLY
Qty: 90 TABLET | Refills: 1 | OUTPATIENT
Start: 2025-02-19

## 2025-03-07 ENCOUNTER — HOSPITAL ENCOUNTER (OUTPATIENT)
Age: 50
Discharge: HOME | End: 2025-03-07
Attending: EMERGENCY MEDICINE
Payer: COMMERCIAL

## 2025-03-07 ENCOUNTER — APPOINTMENT (OUTPATIENT)
Dept: GENERAL RADIOLOGY | Age: 50
End: 2025-03-07
Attending: EMERGENCY MEDICINE
Payer: COMMERCIAL

## 2025-03-07 VITALS
HEART RATE: 60 BPM | OXYGEN SATURATION: 100 % | TEMPERATURE: 98 F | RESPIRATION RATE: 18 BRPM | DIASTOLIC BLOOD PRESSURE: 80 MMHG | SYSTOLIC BLOOD PRESSURE: 140 MMHG

## 2025-03-07 DIAGNOSIS — M25.562 ACUTE PAIN OF LEFT KNEE: Primary | ICD-10-CM

## 2025-03-07 PROCEDURE — 99214 OFFICE O/P EST MOD 30 MIN: CPT

## 2025-03-07 PROCEDURE — 73560 X-RAY EXAM OF KNEE 1 OR 2: CPT | Performed by: EMERGENCY MEDICINE

## 2025-03-07 PROCEDURE — 99213 OFFICE O/P EST LOW 20 MIN: CPT

## 2025-03-08 NOTE — DISCHARGE INSTRUCTIONS
Tylenol or ibuprofen as needed for pain.  Apply ice 20 minutes 4 times a day use the Ace wrap minimal weightbearing over the next few days.  Follow-up with orthopedic surgery next week return for worsening symptoms.

## 2025-03-08 NOTE — ED PROVIDER NOTES
Patient Seen in: [facility]      History     Chief Complaint   Patient presents with    Knee Pain    Leg Pain     Stated Complaint: Calf and Knee Pain    Subjective:   HPI      49-year-old female complaining of left knee pain.  Patient states that started hurting over last couple of days is mild pain at rest when she goes to stand up it worsens and she has to walk for a few minutes 4 columns down somewhat mainly on the lateral aspect of the leg there is some mild calf pain as well.  There was no sudden injury she has had a similar episode about a year ago and had some physical therapy which improved.    Objective:     Past Medical History:    Arrhythmia    Atrial tachycardia (HCC)    Bilateral carpal tunnel syndrome    BPPV (benign paroxysmal positional vertigo)    Chronic hypertension    Edema    Environmental allergies    Fatigue    Fibroids    Ganglion of tendon sheath    GERD (gastroesophageal reflux disease)    Herpes simplex virus (HSV) infection    Low back - had some itching & a rash. Recurred in same area once. No issue for awhile.     History of pelvic ultrasound    2/16/22 Pelvic US - per [facility]CP [facility] 6 mm. Lfet fundal fibroid 2.8 cm (previously 3.2 cm), right anterior fibroid 1.7 cm (previously 1.8 cm), ovaries normal. Trace free fluid in cul de sac.     Insulin resistance    Iron deficiency anemia secondary to inadequate dietary iron intake    Migraine without aura    just photophobia    Migraines    Morbid obesity with BMI of 45.0-49.9, adult (HCC)    PAC (premature atrial contraction)    PAF (paroxysmal atrial fibrillation) (HCC)    Pain in joint, lower leg    Palpitations    Pap smear for cervical cancer screening    Pap & HPV negative.     Personal history of urinary calculi    Prediabetes    PVC (premature ventricular contraction)    Screening mammogram for breast cancer    Negative    Shortness of breath    Sleep apnea    Unspecified sinusitis (chronic)    Visual impairment    glasses     Vitamin D deficiency    Consistently since 2011    Wears glasses    Weight gain              Past Surgical History:   Procedure Laterality Date    Colonoscopy N/A 4/6/2023    Procedure: COLONOSCOPY;  Surgeon: Meera Packer MD;  Location:  ENDOSCOPY    Correct bunion,othr methods      Foot/toes surgery proc unlisted      bunion surgery at 16 years old - both                 Social History     Socioeconomic History    Marital status:    Tobacco Use    Smoking status: Never     Passive exposure: Never    Smokeless tobacco: Never   Vaping Use    Vaping status: Never Used   Substance and Sexual Activity    Alcohol use: Yes     Alcohol/week: 0.0 standard drinks of alcohol     Comment: socially    Drug use: No    Sexual activity: Yes     Partners: Male     Birth control/protection: Vasectomy   Other Topics Concern    Caffeine Concern Yes     Comment: occ pop    Exercise No              Review of Systems    Positive for stated complaint: Calf and Knee Pain  Other systems are as noted in HPI.  Constitutional and vital signs reviewed.      All other systems reviewed and negative except as noted above.    Physical Exam     ED Triage Vitals [03/07/25 1844]   /80   Pulse 60   Resp 18   Temp 98 °F (36.7 °C)   Temp src Oral   SpO2 100 %   O2 Device None (Room air)       Current Vitals:   Vital Signs  BP: 140/80  Pulse: 60  Resp: 18  Temp: 98 °F (36.7 °C)  Temp src: Oral    Oxygen Therapy  SpO2: 100 %  O2 Device: None (Room air)        Physical Exam  Patient is alert and orient x 3 no acute distress patient's weight is 122 kg.  Left knee there is mild tenderness over the lateral aspect there is full range of motion the anterior drawer test is normal medial lateral collateral ligaments are intact the calf is nontender there is no edema distally Achilles tendon is intact neurovascular is intact distally.    ED Course   Labs Reviewed - No data to display         XR KNEE (1 OR 2 VIEWS), LEFT  (QPN=76359)    Result Date: 3/7/2025  CONCLUSION:  No acute osseous abnormality is evident.   LOCATION:  [facility]   Dictated by (CST): Stromberg, LeRoy, MD on 3/07/2025 at 7:29 PM     Finalized by (CST): Stromberg, LeRoy, MD on 3/07/2025 at 7:30 PM      Images independently reviewed no acute abnormality.       MDM      Initial differential diagnosis considered but not limited to includes arthritis fracture ligamentous injury meniscal tear        Medical Decision Making    Patient has lateral knee pain probable meniscal injury.  Advised anti-inflammatory medicine Ace wrap follow-up with orthopedic surgery.  Disposition and Plan     Clinical Impression:  1. Acute pain of left knee         Disposition:  Discharge  3/7/2025  7:36 pm    Follow-up:  Kyle Brown MD  [address] [place] [address]  [phone]    In 1 week            Medications Prescribed:  Current Discharge Medication List              Supplementary Documentation:

## 2025-03-11 DIAGNOSIS — Z00.00 LABORATORY EXAMINATION ORDERED AS PART OF A ROUTINE GENERAL MEDICAL EXAMINATION: ICD-10-CM

## 2025-03-12 RX ORDER — METOPROLOL SUCCINATE 50 MG/1
50 TABLET, EXTENDED RELEASE ORAL DAILY
Qty: 90 TABLET | Refills: 1 | OUTPATIENT
Start: 2025-03-12

## 2025-03-17 ENCOUNTER — TELEPHONE (OUTPATIENT)
Dept: ORTHOPEDICS CLINIC | Facility: CLINIC | Age: 50
End: 2025-03-17

## 2025-03-17 NOTE — TELEPHONE ENCOUNTER
Patient coming for left knee pain. Please advise if additional xrays needed  Future Appointments   Date Time Provider Department Center   3/20/2025  2:00 PM [id number]   4/28/2025 11:00 AM Jenny Santizo DO [facility]

## 2025-03-18 ENCOUNTER — TELEPHONE (OUTPATIENT)
Facility: CLINIC | Age: 50
End: 2025-03-18

## 2025-03-18 DIAGNOSIS — Z01.89 ENCOUNTER FOR LOWER EXTREMITY COMPARISON IMAGING STUDY: ICD-10-CM

## 2025-03-18 DIAGNOSIS — M25.562 LEFT KNEE PAIN, UNSPECIFIED CHRONICITY: Primary | ICD-10-CM

## 2025-03-18 NOTE — TELEPHONE ENCOUNTER
No further imaging needed.    - per protocol if the patient has x rays within 3 months of the visit at our facility or at an outside facility MD will see the patient first and determine if repeat x rays are needed.

## 2025-03-18 NOTE — TELEPHONE ENCOUNTER
New pt appt for *LEFT KNEE PAIN;  XR IN EPIC (pt will arrived early for imaging)    Future Appointments   Date Time Provider Department Center   3/28/2025  9:20 AM [id number]   4/28/2025 11:00 AM Jenny Santizo DO [facility]

## 2025-03-28 ENCOUNTER — OFFICE VISIT (OUTPATIENT)
Dept: ORTHOPEDICS CLINIC | Facility: CLINIC | Age: 50
End: 2025-03-28
Payer: COMMERCIAL

## 2025-03-28 VITALS — BODY MASS INDEX: 50.03 KG/M2 | HEIGHT: 61.2 IN | WEIGHT: 265 LBS

## 2025-03-28 DIAGNOSIS — S83.207A POSITIVE MCMURRAY TEST OF LEFT KNEE, INITIAL ENCOUNTER: Primary | ICD-10-CM

## 2025-03-28 DIAGNOSIS — M23.92 LOCKING OF LEFT KNEE: ICD-10-CM

## 2025-03-28 PROCEDURE — 99204 OFFICE O/P NEW MOD 45 MIN: CPT | Performed by: ORTHOPAEDIC SURGERY

## 2025-03-28 NOTE — H&P
Orthopaedic Surgery  [address] [place] [address]  [phone]     NEW PATIENT VISIT - HISTORY AND PHYSICAL EXAMINATION     Name: Yelitza Acevedo   MRN: [id number]  Date: 3/28/2025     CC: Left Knee Pain    REFERRED BY: Jenny Santizo DO    HPI:   Yelitza Acevedo is a very pleasant 49 year old female who presents today for evaluation of ongoing intermittent knee pain that got progressively worse with time and migrating to the lateral side of the left calf.     Onset was approximately 1.5 years ago. She feels that the 8 sessions of PT she completed with [facility] was helpful. She has had no prior injections. She has been talking OTC ibuprofen and cyclobenzaprine to help control the pain.         PMH:   Past Medical History:    Arrhythmia    Atrial tachycardia (HCC)    Bilateral carpal tunnel syndrome    BPPV (benign paroxysmal positional vertigo)    Chronic hypertension    Edema    Environmental allergies    Fatigue    Fibroids    Ganglion of tendon sheath    GERD (gastroesophageal reflux disease)    Herpes simplex virus (HSV) infection    Low back - had some itching & a rash. Recurred in same area once. No issue for awhile.     History of pelvic ultrasound    2/16/22 Pelvic US - per PCP EMS 6 mm. Lfet fundal fibroid 2.8 cm (previously 3.2 cm), right anterior fibroid 1.7 cm (previously 1.8 cm), ovaries normal. Trace free fluid in cul de sac.     Insulin resistance    Iron deficiency anemia secondary to inadequate dietary iron intake    Migraine without aura    just photophobia    Migraines    Morbid obesity with BMI of 45.0-49.9, adult (HCC)    PAC (premature atrial contraction)    PAF (paroxysmal atrial fibrillation) (HCC)    Pain in joint, lower leg    Palpitations    Pap smear for cervical cancer screening    Pap & HPV negative.     Personal history of urinary calculi    Prediabetes    PVC (premature ventricular contraction)    Screening mammogram for breast cancer    Negative    Shortness of  breath    Sleep apnea    Unspecified sinusitis (chronic)    Visual impairment    glasses    Vitamin D deficiency    Consistently since 2011    Wears glasses    Weight gain       PAST SURGICAL HX:  Past Surgical History:   Procedure Laterality Date    Colonoscopy N/A 4/6/2023    Procedure: COLONOSCOPY;  Surgeon: Meera Packer MD;  Location:  ENDOSCOPY    Correct bunion,othr methods      Foot/toes surgery proc unlisted      bunion surgery at 16 years old - both        FAMILY HX:  Family History   Problem Relation Age of Onset    Heart Attack Maternal Grandmother     Diabetes Maternal Grandmother     Hypertension Mother         Mom menopause at 51    Carotid stenosis Mother     Hypertension Father     Asthma Daughter     No Known Problems Son     No Known Problems Maternal Grandfather     No Known Problems Paternal Grandmother     No Known Problems Paternal Grandfather     Asthma Son     Prostate Cancer Neg     Colon Cancer Neg     Uterine Cancer Neg     Pancreatic Cancer Neg     Ovarian Cancer Neg     Breast Cancer Neg     Endometriosis Neg     Infertility Neg     Thyroid disease Neg     Endometrial Cancer Neg        ALLERGIES:  Patient has no known allergies.    MEDICATIONS:   Current Outpatient Medications   Medication Sig Dispense Refill    cyclobenzaprine 10 MG Oral Tab Take 1 tablet (10 mg total) by mouth 3 (three) times daily as needed for Muscle spasms. 30 tablet 1    metoclopramide 10 MG Oral Tab       naproxen 500 MG Oral Tab       topiramate 100 MG Oral Tab Take 1 tablet (100 mg total) by mouth 2 (two) times daily. 180 tablet 1    metoprolol succinate ER 50 MG Oral Tablet 24 Hr Take 1 tablet (50 mg total) by mouth daily. 90 tablet 1    Omeprazole 40 MG Oral Capsule Delayed Release Take 1 capsule (40 mg total) by mouth daily. 90 capsule 1    ibuprofen 600 MG Oral Tab Take 1 tablet (600 mg total) by mouth every 8 (eight) hours as needed for Pain. 90 tablet 1    JACKIE BERRY OR Take by mouth daily.       NON FORMULARY daily. Soursat      NON FORMULARY daily. moranga      NON FORMULARY daily. Sea chavez      NON FORMULARY daily. vilberry      metoprolol succinate ER 25 MG Oral Tablet 24 Hr Take 2 tablets (50 mg total) by mouth daily.      acetaminophen 500 MG Oral Tab Take 2 tablets (1,000 mg total) by mouth every 6 (six) hours as needed for Pain.      Vitamin D3 50 MCG (2000 UT) Oral Cap Take 1 capsule (2,000 Units total) by mouth daily.         ROS: A comprehensive 14 point review of systems was performed and was negative aside from the aforementioned per history of present illness.    SOCIAL HX:  Social History     Tobacco Use    Smoking status: Never     Passive exposure: Never    Smokeless tobacco: Never   Substance Use Topics    Alcohol use: Yes     Alcohol/week: 0.0 standard drinks of alcohol     Comment: socially       PE:   There were no vitals filed for this visit.  Estimated body mass index is 49.38 kg/m² as calculated from the following:    Height as of 2/3/25: 5' 2\" (1.575 m).    Weight as of 2/3/25: 270 lb.    Physical Exam  Constitutional:       Appearance: Normal appearance.   HENT:      Head: Normocephalic and atraumatic.   Eyes:      Extraocular Movements: Extraocular movements intact.   Neck:      Musculoskeletal: Normal range of motion and neck supple.   Cardiovascular:      Pulses: Normal pulses.   Pulmonary:      Effort: Pulmonary effort is normal. No respiratory distress.   Abdominal:      General: There is no distension.   Skin:     General: Skin is warm.      Capillary Refill: Capillary refill takes less than 2 seconds.      Findings: No bruising.   Neurological:      General: No focal deficit present.      Mental Status: Alert.   Psychiatric:         Mood and Affect: Mood normal.     Examination of the left knee demonstrates:   Skin is intact, warm and dry.   Atrophy: none    Effusion: small    Joint line tenderness: medial  Crepitation: none   Terra: Positive   Patellar mobility:  normal without apprehension  J-sign: none    ROM: Extension lacking less than 5 degrees  Flexion 120 degrees  ACL:  Negative Lachman, Negative Pivot Shift   PCL:  Negative Posterior Drawer  Collateral Ligaments: Stable to Varus and Valgus stress at 0 and 30 degrees  Strength: mild weakness   Hip joint: normal pain-free ROM   Gait:  normal   Leg length: equal and symmetric  Alignment:  neutral     No obvious peripheral edema noted.   Distal neurovascular exam demonstrates normal perfusion, intact sensation to light touch and full strength.     Examination of the contralateral knee demonstrates:  No significant atrophy, swelling or effusion. Full range of motion. Neurovascularly intact distally    Radiographic Examination/Diagnostics:  Knee XR personally viewed, independently interpreted and radiology report was reviewed.    XR KNEE (1 OR 2 VIEWS), LEFT (CPT=73560)  Result Date: 3/7/2025  PROCEDURE:  XR KNEE (1 OR 2 VIEWS), LEFT (CPT=73560)    COMPARISON:  [facility], XR, XR KNEE (1 OR 2 VIEWS), LEFT (CPT=73560), 5/21/2021, 1:54 PM.    INDICATIONS:  Calf and Knee Pain    PATIENT STATED HISTORY: (As transcribed by Technologist)  Left calf pain and lateral left knee pain.      FINDINGS:  Enthesopathy is seen at the patella.  No acute displaced osseous fracture or dislocation.  No significant joint effusion.           CONCLUSION:  No acute osseous abnormality is evident.     LOCATION:  [facility]     Dictated by (CST): Stromberg, LeRoy, MD on 3/07/2025 at 7:29 PM       Finalized by (CST): Stromberg, LeRoy, MD on 3/07/2025 at 7:30 PM         IMPRESSION: Yelitza Acevedo is a 49 year old female with suspected meniscus pathology in the setting of positive Terra test and locking of the left knee. MRI scan will be obtained to further characterize the underlying pathology.     PLAN:   We had a detailed discussion outlining the etiology, anatomy, pathophysiology, and natural history of the patient's findings. Imaging was reviewed  in detail and correlated to a 3-dimensional model of the knee.     In light of the acute traumatic incident, loss of normal function, and  failure to progress conservatively we recommend an MRI to evaluate the integrity of the patient's findings. The patient will follow up after imaging.   Differential diagnosis includes but not limited to: rotator cuff/labral pathology, impingement, tendinopathy, cartilage injury/loose body, bone marrow edema, and osteoarthritis.     External records were also reviewed for pertinent historical findings contributing to the patients undiagnosed new problem with uncertain prognosis.     The patient had the opportunity to ask questions, and all questions were answered appropriately.     FOLLOW-UP:  Return to clinic after MRI completion.       Kyle Brown MD  Knee, Shoulder, & Elbow Surgery / Sports Medicine Specialist  Orthopaedic Surgery  [address] [place] [address].org  [email or web address]  t: [phone]  o: [phone]  f: [phone]    This note was dictated using Dragon software.  While it was briefly proofread prior to completion, some grammatical, spelling, and word choice errors due to dictation may still occur.   I, Soila Stafford, attest that this documentation has been prepared under the direction and in the presence of Dr. Kyle Brown MD.

## 2025-04-09 ENCOUNTER — OFFICE VISIT (OUTPATIENT)
Dept: ORTHOPEDICS CLINIC | Facility: CLINIC | Age: 50
End: 2025-04-09
Payer: COMMERCIAL

## 2025-04-09 DIAGNOSIS — M94.262 CHONDROMALACIA OF LEFT KNEE: ICD-10-CM

## 2025-04-09 DIAGNOSIS — S83.232A COMPLEX TEAR OF MEDIAL MENISCUS OF LEFT KNEE AS CURRENT INJURY, INITIAL ENCOUNTER: Primary | ICD-10-CM

## 2025-04-09 PROCEDURE — 99214 OFFICE O/P EST MOD 30 MIN: CPT | Performed by: ORTHOPAEDIC SURGERY

## 2025-04-09 NOTE — PROGRESS NOTES
Orthopaedic Surgery  [address] [place] [address]  [phone]       Name: Yelitza Acevedo   MRN: [id number]  Date: 4/9/2025     REASON FOR VISIT: Left knee MRI review and discussion regarding next steps in management.     INTERVAL HISTORY:  Yelitza Acevedo is a 49 year old female who presents today for MRI review of the knee.     To summarize: intermittent knee pain that got progressively worse with time and migrating to the lateral side of the left calf. Onset was approximately 1.5 years ago. She feels that the 8 sessions of PT she completed with [facility] was helpful. She has had no prior injections. She has been taking OTC ibuprofen and cyclobenzaprine to help control the pain.      Her symptoms have self-improved since the previous visit.     PE:   There were no vitals filed for this visit.  Estimated body mass index is 49.74 kg/m² as calculated from the following:    Height as of 3/28/25: 5' 1.2\" (1.554 m).    Weight as of 3/28/25: 265 lb.    Physical Exam  Constitutional:       Appearance: Normal appearance.   HENT:      Head: Normocephalic and atraumatic.   Eyes:      Extraocular Movements: Extraocular movements intact.   Neck:      Musculoskeletal: Normal range of motion and neck supple.   Cardiovascular:      Pulses: Normal pulses.   Pulmonary:      Effort: Pulmonary effort is normal. No respiratory distress.   Abdominal:      General: There is no distension.   Skin:     General: Skin is warm.      Capillary Refill: Capillary refill takes less than 2 seconds.      Findings: No bruising.   Neurological:      General: No focal deficit present.      Mental Status: Alert.   Psychiatric:         Mood and Affect: Mood normal.     Examination of the left knee demonstrates:   Skin is intact, warm and dry.   Atrophy: none    Effusion: none    Joint line tenderness: none  Crepitation: none   Terra: Negative   Patellar mobility: normal without apprehension  J-sign: none    ROM: Extension lacking  2 degrees  Flexion  125 degrees  ACL:  Negative Lachman, Negative Pivot Shift   PCL:  Negative Posterior Drawer  Collateral Ligaments: Stable to Varus and Valgus stress at 0 and 30 degrees  Strength: normal   Hip joint: normal pain-free ROM   Gait:  normal   Leg length: equal and symmetric  Alignment:  neutral     No obvious peripheral edema noted.   Distal neurovascular exam demonstrates normal perfusion, intact sensation to light touch and full strength.    The contralateral lower extremity is without limitation in range of motion or strength, no positive provocative maneuvers.     Radiographic Examination/Diagnostics:    MRI of the knee personally viewed, independently interpreted and radiology report was reviewed.    MRI KNEE, LEFT (PHG=57475)    Result Date: 4/3/2025  Exam: MRI KNEE LT W/O CONTRAST CPT Code(s): 76752 - MRI JNT OF LWR EXTRE W/O DYE CLINICAL HISTORY: Left knee pain, weakness and limited range of motion. Locking (M23.92) and positive Terra test (S83.207A). TECHNIQUE: Non-infused, multiplanar and multi-sequential ultrahigh field 3.0 Crissy MRI of the left knee was performed. COMPARISON: Onset x-ray report dated 3/7/2025 is reviewed. FINDINGS: Body habitus somewhat limits this exam. There is a small joint effusion and a small amount of lobulated fluid in the semimembranosus/medial gastrocnemius bursa. There is mild lobulated/septated fluid in the proximal fibers of the ACL posteriorly. The fluid protrudes from the proximal aspect of the ACL posterosuperiorly. No discrete ACL tear is appreciated and the majority of the ACL fibers appear intact. The PCL, MCL, LCL, popliteus tendon and distal quadriceps tendon appear intact. There is mild increased signal in the proximal patellar tendon centrally without discrete tear. The lateral meniscus is intact. Increased signal contacts the intra-articular surface of the body of the medial meniscus. The posterior horn appears small and irregular with a small  focus of abnormal linear increased signal extending to both articular surfaces (sagittal PD FS images 14-15, series 5). There is mild tricompartmental joint space narrowing with scattered thinning and irregularity of the articular cartilage. This is most pronounced at the patellar apex with there are two small full-thickness, or near full-thickness, chondral fissures. Mild edema-like signal is seen subjacent to the tibial spines, likely reactive to the adjacent ACL pathology. No fracture or focal bone lesion is identified. The musculature demonstrates normal signal characteristics.     IMPRESSION:   1. Medial meniscal tear.   2. ACL ganglion cyst. There is mild reactive bone marrow edema subjacent to the tibial spines.   3. Early degenerative changes of the knee. There is chondral fissuring at the patellar apex.   4. Mild proximal patellar tendinosis.   5. Small joint effusion and small Baker's cyst.   This report was performed utilizing speech recognition software technology. Despite thorough proofreading, speech recognition errors could escape detection. If a word or phrase is confusing or out of context, please do not hesitate to call for clarification. Interpreting Radiologist: Reji Peralta M.D. 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 Physical therapy has provided some improvement.     We elected to maximize conservative management with physical therapy.     PLAN:   We reviewed the treatment of this disease condition.  Fortunately, treatment is amenable to conservative treatment which we chose to optimize at today's visit.  I recommended physical therapy to aid in strengthening, range of motion, functional improvement, and return to baseline activity.      If symptoms continue to persist, we discussed the option for an intra-articular corticosteroid and ketorolac injection.    We also  outlined the role of possible consideration of knee arthroscopy if deemed necessary.     The patient had the opportunity to ask questions and all questions were answered appropriately.        FOLLOW-UP:   Proceed with physical therapy for 8-10 weeks. If symptoms do not resolve, plan for follow-up to discussion additional management plan.       Kyle Brown MD  Knee, Shoulder, & Elbow Surgery / Sports Medicine Specialist  Orthopaedic Surgery  [address] [place] [address] [id number] [place] [address].[facility]  [email or web address]  t: [phone]  o: [phone]  f: [phone]    This note was dictated using Dragon software.  While it was briefly proofread prior to completion, some grammatical, spelling, and word choice errors due to dictation may still occur.

## 2025-04-09 NOTE — H&P
Orthopaedic Surgery  [address] [place] [id number]  [phone]     PRE SURGICAL - HISTORY AND PHYSICAL EXAMINATION     Name: Yelitza Acevedo   MRN: [id number]  Date: 4/9/2025     CC: Left Knee Pain and mechanical symptoms    REFERRED BY: Jenny Santizo DO    HPI:   Yelitza Acevedo is a very pleasant 49 year old female who presents today for evaluation of Left knee pain and mechanical symptoms and recent completion of MRI demonstrating meniscal pathology.     To summarize: intermittent knee pain that got progressively worse with time and migrating to the lateral side of the left calf. Onset was approximately 1.5 years ago. She feels that the 8 sessions of PT she completed with [facility] was helpful. She has had no prior injections. She has been taking OTC ibuprofen and cyclobenzaprine to help control the pain.     Her symptoms have self-improved since the previous visit.     PMH:   Past Medical History[1]    PAST SURGICAL HX:  Past Surgical History[2]    FAMILY HX:  Family History[3]    ALLERGIES:  Patient has no known allergies.    MEDICATIONS: Current Medications[4]    ROS: A comprehensive 14 point review of systems was performed and was negative aside from the aforementioned per history of present illness.    SOCIAL HX:  Social History     Tobacco Use    Smoking status: Never     Passive exposure: Never    Smokeless tobacco: Never   Substance Use Topics    Alcohol use: Yes     Alcohol/week: 0.0 standard drinks of alcohol     Comment: socially       PE:   There were no vitals filed for this visit.  Estimated body mass index is 49.74 kg/m² as calculated from the following:    Height as of 3/28/25: 5' 1.2\" (1.554 m).    Weight as of 3/28/25: 265 lb.    Physical Exam  Constitutional:       Appearance: Normal appearance.   HENT:      Head: Normocephalic and atraumatic.   Eyes:      Extraocular Movements: Extraocular movements intact.   Neck:      Musculoskeletal: Normal range of motion and neck  supple.   Cardiovascular:      Pulses: Normal pulses.   Pulmonary:      Effort: Pulmonary effort is normal. No respiratory distress.   Abdominal:      General: There is no distension.   Skin:     General: Skin is warm.      Capillary Refill: Capillary refill takes less than 2 seconds.      Findings: No bruising.   Neurological:      General: No focal deficit present.      Mental Status: Alert.   Psychiatric:         Mood and Affect: Mood normal.     Examination of the left knee demonstrates:     Skin is intact, warm and dry.   Atrophy: {Blank single:19197::\"mild\",\"moderate\",\"significant\",\"none\"}    Effusion: {Blank single:19197::\"moderate\",\"large\",\"none\",\"small\"}    Joint line tenderness: {Blank single:19197::\"medial\",\"lateral\",\"both\",\"none\"}  Crepitation: {Blank single:19197::\"mild\",\"moderate\",\"significant\",\"none\"}   Terra: {Blank single:19197::\"Negative\",\"Positive\"}   Patellar mobility: {Blank single:19197::\"hypermobile without apprehension\",\"apprehension\",\"normal without apprehension\"}  J-sign: {Blank single:19197::\"mild\",\"moderate\",\"significant\",\"none\"}    ROM: Extension {Blank single:19197::\"hyperextension\",\"lacking *** degrees\",\"full\",\"lacking less than 5 degrees\"}  Flexion {Blank single:19197::\"***\",'60 degrees\",\"90 degrees\",\"140 degrees\",\"120 degrees\"}  ACL:  {Blank single:19197::\"***\",\"Negative Lachman, Negative Pivot Shift\"}   PCL:  {Blank single:19197::\"***\",\"Negative Posterior Drawer\"}  Collateral Ligaments: {Blank single:19197::\"***\",\"Stable to Varus and Valgus stress at 0 and 30 degrees\"}  Strength: {Blank single:19197::\"significantly weak\",\"normal\",\"mild weakness\"}   Hip joint: {Blank single:19197::\"pain with Flexion IR\",\"ROM abnormality: ***\",\"normal pain-free ROM\"}   Gait:  {Blank single:19197::\"significantly antalgic\",\"using assistive device\",\"normal\",\"mildly antalgic\"}   Leg length: {Blank single:19197::\"***\",\"within 1/4\"\",\"equal and symmetric\"}  Alignment:  {Blank  single:76143::\"varus\",\"valgus\",\"neutral\"}     No obvious peripheral edema noted.   Distal neurovascular exam demonstrates normal perfusion, intact sensation to light touch and full strength.     Examination of the contralateral knee demonstrates:  No significant atrophy, swelling or effusion. Full range of motion. Neurovascularly intact distally.    Radiographic Examination/Diagnostics:  MRI of the knee personally viewed, independently interpreted and radiology report was reviewed.    MRI KNEE, LEFT (ENP=90946)    Result Date: 4/3/2025  Exam: MRI KNEE LT W/O CONTRAST CPT Code(s): 57338 - MRI JNT OF LWR EXTRE W/O DYE CLINICAL HISTORY: Left knee pain, weakness and limited range of motion. Locking (M23.92) and positive Terra test (S83.207A). TECHNIQUE: Non-infused, multiplanar and multi-sequential ultrahigh field 3.0 Crissy MRI of the left knee was performed. COMPARISON: Onset x-ray report dated 3/7/2025 is reviewed. FINDINGS: Body habitus somewhat limits this exam. There is a small joint effusion and a small amount of lobulated fluid in the semimembranosus/medial gastrocnemius bursa. There is mild lobulated/septated fluid in the proximal fibers of the ACL posteriorly. The fluid protrudes from the proximal aspect of the ACL posterosuperiorly. No discrete ACL tear is appreciated and the majority of the ACL fibers appear intact. The PCL, MCL, LCL, popliteus tendon and distal quadriceps tendon appear intact. There is mild increased signal in the proximal patellar tendon centrally without discrete tear. The lateral meniscus is intact. Increased signal contacts the intra-articular surface of the body of the medial meniscus. The posterior horn appears small and irregular with a small focus of abnormal linear increased signal extending to both articular surfaces (sagittal PD FS images 14-15, series 5). There is mild tricompartmental joint space narrowing with scattered thinning and irregularity of the articular cartilage.  This is most pronounced at the patellar apex with there are two small full-thickness, or near full-thickness, chondral fissures. Mild edema-like signal is seen subjacent to the tibial spines, likely reactive to the adjacent ACL pathology. No fracture or focal bone lesion is identified. The musculature demonstrates normal signal characteristics.     IMPRESSION:   1. Medial meniscal tear.   2. ACL ganglion cyst. There is mild reactive bone marrow edema subjacent to the tibial spines.   3. Early degenerative changes of the knee. There is chondral fissuring at the patellar apex.   4. Mild proximal patellar tendinosis.   5. Small joint effusion and small Baker's cyst.   This report was performed utilizing speech recognition software technology. Despite thorough proofreading, speech recognition errors could escape detection. If a word or phrase is confusing or out of context, please do not hesitate to call for clarification. Interpreting Radiologist: Reji Peralta M.D. 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  They have failed extensive conservative treatment including Physical therapy greater than 6 weeks, oral anti-inflammatory medications, and activity modification.     Consequently, they are an appropriate candidate for knee arthroscopy, partial meniscectomy and extensive debridement/synovectomy.    PLAN:   I had a lengthy discussion with Yelitza about the diagnosis and options, both surgical and nonsurgical. I have recommended that we proceed with arthroscopic surgical treatment as we agree that this intervention will likely offer the best opportunity for symptomatic relief and functional recovery. I used the MRI scan and a 3-dimensional knee model to outline her pathology, as well as general surgical principles. We reviewed the risks associated with arthroscopic partial meniscectomy  and debridement / synovectomy.  In particular I emphasized that the displaced flap component and degenerative portion of the meniscus would be removed as this is dysvascular.  Simultaneously, I will perform a diagnostic knee arthroscopy of the knee and hope to identify and address any other pathology that may be encountered such as scar tissue, inflammation, cartilage pathology.  In particular we discussed risks that include, a low risk of infection (<1%), potential transient or permanent injury to nerves with superficial numbness, joint stiffness which may require additional physical therapy, persistent pain/lack of symptomatic improvement, need for future operation, wound complications (rare as incisions are very small), deep vein thrombosis (DVT) and pulmonary embolism (PE).   We discussed the proposed rehabilitation timeline as well as expected postoperative restrictions.  In this regard, I emphasized that there will be no weightbearing or range of motion restrictions post operatively.  Crutches will be provided initially for patient comfort and I will aim to have the patient begin physical therapy on postoperative day 1.  The advantage of this is to begin immediate mobility and range of motion to mitigate pain and encourage reduction of inflammation/swelling.  This will ultimately lead to a quicker postsurgical rehabilitation.  For most patients, this requires a total of 4 to 6 weeks of postsurgical physical therapy to attain full functional status after simple knee arthroscopy.  Yelitza voiced a good understanding of treatment options, risks and benefits, postoperative instructions, rehabilitation timeline, and restrictions. She was given the opportunity to ask questions, which were all answered to the best of my ability and to her satisfaction. Yelitza will work with my office to arrange a time for surgery and obtain any medical clearance information necessary. My pre-operative information packet, which details  the process and answers many FAQ's will be provided. She was encouraged to call the office with any further questions or concerns.  I spent 60 minutes in preparation to see the patient, counseling/education of relevant pathology, discussing imaging results, ordering post surgical physical therapy intervention, surgical counseling, and care coordination.        FOLLOW-UP:  Post-Operative Visit, POD 6 with Sincer KATHERIN العلي PA-C.         Kyle Brown MD  Knee, Shoulder, & Elbow Surgery / Sports Medicine Specialist  Orthopaedic Surgery  [address] [place] [address].org  [email or web address]  t: [phone]  o: [phone]  f: [phone]    This note was dictated using Dragon software.  While it was briefly proofread prior to completion, some grammatical, spelling, and word choice errors due to dictation may still occur.          [1]   Past Medical History:   Arrhythmia    Atrial tachycardia (HCC)    Bilateral carpal tunnel syndrome    BPPV (benign paroxysmal positional vertigo)    Chronic hypertension    Edema    Environmental allergies    Fatigue    Fibroids    Ganglion of tendon sheath    GERD (gastroesophageal reflux disease)    Herpes simplex virus (HSV) infection    Low back - had some itching & a rash. Recurred in same area once. No issue for awhile.     History of pelvic ultrasound    2/16/22 Pelvic US - per [facility] 6 mm. Lfet fundal fibroid 2.8 cm (previously 3.2 cm), right anterior fibroid 1.7 cm (previously 1.8 cm), ovaries normal. Trace free fluid in cul de sac.     Insulin resistance    Iron deficiency anemia secondary to inadequate dietary iron intake    Migraine without aura    just photophobia    Migraines    Morbid obesity with BMI of 45.0-49.9, adult (HCC)    PAC (premature atrial contraction)    PAF (paroxysmal atrial fibrillation) (HCC)    Pain in joint, lower leg    Palpitations    Pap smear for cervical cancer screening    Pap & HPV negative.     Personal  history of urinary calculi    Prediabetes    PVC (premature ventricular contraction)    Screening mammogram for breast cancer    Negative    Shortness of breath    Sleep apnea    Unspecified sinusitis (chronic)    Visual impairment    glasses    Vitamin D deficiency    Consistently since 2011    Wears glasses    Weight gain   [2]   Past Surgical History:  Procedure Laterality Date    Colonoscopy N/A 4/6/2023    Procedure: COLONOSCOPY;  Surgeon: Meera Packer MD;  Location:  ENDOSCOPY    Correct bunion,othr methods      Foot/toes surgery proc unlisted      bunion surgery at 16 years old - both    [3]   Family History  Problem Relation Age of Onset    Heart Attack Maternal Grandmother     Diabetes Maternal Grandmother     Hypertension Mother         Mom menopause at 51    Carotid stenosis Mother     Hypertension Father     Asthma Daughter     No Known Problems Son     No Known Problems Maternal Grandfather     No Known Problems Paternal Grandmother     No Known Problems Paternal Grandfather     Asthma Son     Prostate Cancer Neg     Colon Cancer Neg     Uterine Cancer Neg     Pancreatic Cancer Neg     Ovarian Cancer Neg     Breast Cancer Neg     Endometriosis Neg     Infertility Neg     Thyroid disease Neg     Endometrial Cancer Neg    [4]   Current Outpatient Medications   Medication Sig Dispense Refill    cyclobenzaprine 10 MG Oral Tab Take 1 tablet (10 mg total) by mouth 3 (three) times daily as needed for Muscle spasms. 30 tablet 1    metoclopramide 10 MG Oral Tab       naproxen 500 MG Oral Tab       topiramate 100 MG Oral Tab Take 1 tablet (100 mg total) by mouth 2 (two) times daily. 180 tablet 1    metoprolol succinate ER 50 MG Oral Tablet 24 Hr Take 1 tablet (50 mg total) by mouth daily. 90 tablet 1    Omeprazole 40 MG Oral Capsule Delayed Release Take 1 capsule (40 mg total) by mouth daily. 90 capsule 1    ibuprofen 600 MG Oral Tab Take 1 tablet (600 mg total) by mouth every 8 (eight) hours as needed  for Pain. 90 tablet 1    JACKIE BERRY OR Take by mouth daily.      NON FORMULARY daily. Soursat      NON FORMULARY daily. moranga      NON FORMULARY daily. Sea chavez      NON FORMULARY daily. vilberry      metoprolol succinate ER 25 MG Oral Tablet 24 Hr Take 2 tablets (50 mg total) by mouth daily.      acetaminophen 500 MG Oral Tab Take 2 tablets (1,000 mg total) by mouth every 6 (six) hours as needed for Pain.      Vitamin D3 50 MCG (2000 UT) Oral Cap Take 1 capsule (2,000 Units total) by mouth daily.

## 2025-04-28 ENCOUNTER — OFFICE VISIT (OUTPATIENT)
Dept: FAMILY MEDICINE CLINIC | Facility: CLINIC | Age: 50
End: 2025-04-28
Payer: COMMERCIAL

## 2025-04-28 VITALS
OXYGEN SATURATION: 98 % | RESPIRATION RATE: 18 BRPM | HEART RATE: 65 BPM | DIASTOLIC BLOOD PRESSURE: 100 MMHG | SYSTOLIC BLOOD PRESSURE: 140 MMHG | HEIGHT: 61.2 IN | WEIGHT: 270 LBS | BODY MASS INDEX: 50.98 KG/M2

## 2025-04-28 DIAGNOSIS — Z79.899 MEDICATION MANAGEMENT: ICD-10-CM

## 2025-04-28 DIAGNOSIS — E04.1 THYROID NODULE: ICD-10-CM

## 2025-04-28 DIAGNOSIS — R73.03 PREDIABETES: ICD-10-CM

## 2025-04-28 DIAGNOSIS — R63.5 WEIGHT GAIN: ICD-10-CM

## 2025-04-28 DIAGNOSIS — R41.840 INATTENTION: ICD-10-CM

## 2025-04-28 DIAGNOSIS — E66.01 MORBID OBESITY (HCC): Primary | ICD-10-CM

## 2025-04-28 DIAGNOSIS — E55.9 VITAMIN D DEFICIENCY: ICD-10-CM

## 2025-04-28 DIAGNOSIS — Z00.00 LABORATORY EXAMINATION ORDERED AS PART OF A ROUTINE GENERAL MEDICAL EXAMINATION: ICD-10-CM

## 2025-04-28 DIAGNOSIS — Z13.89 SCREENING FOR GENITOURINARY CONDITION: ICD-10-CM

## 2025-04-28 DIAGNOSIS — Z71.1 CONCERN ABOUT MEMORY: ICD-10-CM

## 2025-04-28 LAB
ALBUMIN SERPL-MCNC: 4.9 G/DL (ref 3.2–4.8)
ALBUMIN/GLOB SERPL: 1.8 {RATIO} (ref 1–2)
ALP LIVER SERPL-CCNC: 121 U/L (ref 39–100)
ALT SERPL-CCNC: 13 U/L (ref 10–49)
ANION GAP SERPL CALC-SCNC: 9 MMOL/L (ref 0–18)
AST SERPL-CCNC: 18 U/L (ref ?–34)
BILIRUB SERPL-MCNC: 0.6 MG/DL (ref 0.3–1.2)
BUN BLD-MCNC: 11 MG/DL (ref 9–23)
CALCIUM BLD-MCNC: 9.8 MG/DL (ref 8.7–10.6)
CHLORIDE SERPL-SCNC: 106 MMOL/L (ref 98–112)
CHOLEST SERPL-MCNC: 173 MG/DL (ref ?–200)
CO2 SERPL-SCNC: 26 MMOL/L (ref 21–32)
CREAT BLD-MCNC: 0.72 MG/DL (ref 0.55–1.02)
CREAT UR-SCNC: 72.1 MG/DL
EGFRCR SERPLBLD CKD-EPI 2021: 104 ML/MIN/1.73M2 (ref 60–?)
EST. AVERAGE GLUCOSE BLD GHB EST-MCNC: 131 MG/DL (ref 68–126)
FASTING PATIENT LIPID ANSWER: NO
FASTING STATUS PATIENT QL REPORTED: NO
GLOBULIN PLAS-MCNC: 2.7 G/DL (ref 2–3.5)
GLUCOSE BLD-MCNC: 87 MG/DL (ref 70–99)
HBA1C MFR BLD: 6.2 % (ref ?–5.7)
HDLC SERPL-MCNC: 53 MG/DL (ref 40–59)
LDLC SERPL CALC-MCNC: 107 MG/DL (ref ?–100)
MICROALBUMIN UR-MCNC: <0.3 MG/DL
NONHDLC SERPL-MCNC: 120 MG/DL (ref ?–130)
OSMOLALITY SERPL CALC.SUM OF ELEC: 291 MOSM/KG (ref 275–295)
POTASSIUM SERPL-SCNC: 4.2 MMOL/L (ref 3.5–5.1)
PROT SERPL-MCNC: 7.6 G/DL (ref 5.7–8.2)
SODIUM SERPL-SCNC: 141 MMOL/L (ref 136–145)
TRIGL SERPL-MCNC: 66 MG/DL (ref 30–149)
VIT D+METAB SERPL-MCNC: 32.1 NG/ML (ref 30–100)
VLDLC SERPL CALC-MCNC: 11 MG/DL (ref 0–30)

## 2025-04-28 PROCEDURE — 83036 HEMOGLOBIN GLYCOSYLATED A1C: CPT | Performed by: FAMILY MEDICINE

## 2025-04-28 PROCEDURE — 82306 VITAMIN D 25 HYDROXY: CPT | Performed by: FAMILY MEDICINE

## 2025-04-28 PROCEDURE — 80061 LIPID PANEL: CPT | Performed by: FAMILY MEDICINE

## 2025-04-28 PROCEDURE — 82043 UR ALBUMIN QUANTITATIVE: CPT | Performed by: FAMILY MEDICINE

## 2025-04-28 PROCEDURE — 82570 ASSAY OF URINE CREATININE: CPT | Performed by: FAMILY MEDICINE

## 2025-04-28 PROCEDURE — 80053 COMPREHEN METABOLIC PANEL: CPT | Performed by: FAMILY MEDICINE

## 2025-04-28 RX ORDER — METOPROLOL SUCCINATE 50 MG/1
50 TABLET, EXTENDED RELEASE ORAL DAILY
Qty: 90 TABLET | Refills: 1 | Status: SHIPPED | OUTPATIENT
Start: 2025-04-28

## 2025-04-28 RX ORDER — METOPROLOL SUCCINATE 25 MG/1
25 TABLET, EXTENDED RELEASE ORAL DAILY
Qty: 90 TABLET | Refills: 1 | Status: SHIPPED | OUTPATIENT
Start: 2025-04-28

## 2025-04-28 NOTE — PROGRESS NOTES
Subjective:   Yelitza Acevedo is a 49 year old female who presents for Medication Follow-Up         History/Other:   History of Present Illness  Yelitza Acevedo is a 49 year old female who presents with memory concerns and inattention.    She experiences significant memory issues, characterized by frequent forgetfulness. She forgets events or tasks but eventually remembers them. For example, she confused the day of a party, attending on Saturday instead of Friday. She also has to stop and think to recall what she intended to do, indicating a disruption in her thought process.    She associates some of her memory issues with menopause, mentioning 'foggy brain' as a potential cause. She is uncertain if her symptoms are solely due to menopause or if there is an attention deficit component, as she has difficulty focusing on tasks. Her son, Arthur, has been diagnosed with ADD, which has led her to consider a possible genetic link.    Her blood pressure readings at home have been high recently, with a reading of 148/98 noted yesterday. She acknowledges increased salt intake recently, which may be contributing to the elevated readings. No chest pain, shortness of breath, or blurry vision, but she does report mild pressure behind her eyes and in her head.    She is currently taking metoprolol ER, with refills requested for 50 mg and 25 mg doses. Her last blood work was in October of the previous year, and she is due for a repeat A1c test. Her vitamin D levels were low in October, and she is due for a recheck. She had an ultrasound of her thyroid in December, which showed a complex cyst and a small nodule, both of which are being monitored.       Chief Complaint Reviewed and Verified  No Further Nursing Notes to   Review  Tobacco Reviewed  Allergies Reviewed  Medications Reviewed    Problem List Reviewed  Medical History Reviewed  Surgical History   Reviewed  OB Status Reviewed  Family History Reviewed          Tobacco:  She has never smoked tobacco.    Current Medications[1]      Review of Systems:  Review of Systems  A comprehensive 10 point review of systems was completed.  Pertinent positives and negatives noted in the the HPI.     Objective:   BP (!) 140/100   Pulse 65   Resp 18   Ht 5' 1.2\" (1.554 m)   Wt 270 lb (122.5 kg)   LMP 06/25/2024 (Exact Date)   SpO2 98%   BMI 50.68 kg/m²  Estimated body mass index is 50.68 kg/m² as calculated from the following:    Height as of this encounter: 5' 1.2\" (1.554 m).    Weight as of this encounter: 270 lb (122.5 kg).  Physical Exam      GENERAL: morbid obesity; in no apparent distress  PSYCHE: normal mood and affect  SKIN: no rashes,no suspicious lesions  HEENT: atraumatic, normocephalic  NECK: supple,no adenopathy,no bruits, thyroid nodule.  LUNGS: clear to auscultation  CARDIO: RRR without murmur  GI: good BS's,no masses, HSM or tenderness  EXTREMITIES: no cyanosis, clubbing or edema    Results  LABS  A1c: elevated (Fall 2024)  Vitamin D: low (October 2024)    RADIOLOGY  Thyroid ultrasound: Complex cyst on the right side, less than a centimeter; colloid cyst; nodule 8x6x4 mm on the right side (December 2024)  MRI left knee: Mild arthritis; slight articular cartilage ulcer      Assessment & Plan:   1. Morbid obesity (HCC) (Primary)  -     Referral to Weight Loss Clinic  2. Weight gain  3. Vitamin D deficiency  -     Vitamin D; Future; Expected date: 04/28/2025  -     Vitamin D  4. Prediabetes  -     Hemoglobin A1C; Future; Expected date: 04/28/2025  -     Microalb/Creat Ratio, Random Urine; Future; Expected date: 04/28/2025  -     Hemoglobin A1C  -     Microalb/Creat Ratio, Random Urine  5. Concern about memory  -     PSYCHOLOGY - INTERNAL  6. Inattention  -     PSYCHOLOGY - INTERNAL  7. Thyroid nodule  -     US THYROID (CPT=76536); Future; Expected date: 12/01/2025  8. Medication management  -     Metoprolol Succinate ER; Take 1 tablet (25 mg total) by mouth daily.   Dispense: 90 tablet; Refill: 1  9. Laboratory examination ordered as part of a routine general medical examination  -     Comp Metabolic Panel (14); Future; Expected date: 04/28/2025  -     Lipid Panel; Future; Expected date: 04/28/2025  -     Hemoglobin A1C; Future; Expected date: 04/28/2025  -     Vitamin D; Future; Expected date: 04/28/2025  -     Metoprolol Succinate ER; Take 1 tablet (50 mg total) by mouth daily.  Dispense: 90 tablet; Refill: 1  -     Comp Metabolic Panel (14)  -     Lipid Panel  -     Hemoglobin A1C  -     Vitamin D  10. Screening for genitourinary condition    Assessment & Plan  Hypertension  Home blood pressure readings elevated. Possible inaccurate readings due to cuff size. Increased salt intake noted.  - Advised to reduce salt intake and increase water consumption.  - Instructed to monitor blood pressure twice daily and record for one week.  - Scheduled follow-up in four weeks for blood pressure evaluation.  - Instructed to contact office if blood pressure remains elevated after two weeks for potential medication initiation.    Memory concern  Reports forgetfulness and difficulty remembering tasks. Symptoms may relate to perimenopause or inattention. Neuropsychological testing planned.  - Referred to Dr. Cruz for neuropsychological testing.  - Instructed to bring neuropsychological report to follow-up.    Inattention  Reports difficulty focusing, potential ADD. Family history noted. Neuropsychological testing to clarify diagnosis.  - Referred to Dr. Cruz for neuropsychological testing.  - Instructed to bring neuropsychological report to follow-up.    Thyroid nodule  December ultrasound showed benign complex cyst on left and colloid cyst on right. Nodule on right requires monitoring.  - Ordered repeat thyroid ultrasound in December to monitor nodule size.      Return in about 4 weeks (around 5/26/2025) for HTN check.      Jenny Santizo DO, 4/28/2025, 12:46 PM             [1]    Current Outpatient Medications   Medication Sig Dispense Refill    metoprolol succinate ER 50 MG Oral Tablet 24 Hr Take 1 tablet (50 mg total) by mouth daily. 90 tablet 1    metoprolol succinate ER 25 MG Oral Tablet 24 Hr Take 1 tablet (25 mg total) by mouth daily. 90 tablet 1    cyclobenzaprine 10 MG Oral Tab Take 1 tablet (10 mg total) by mouth 3 (three) times daily as needed for Muscle spasms. 30 tablet 1    metoclopramide 10 MG Oral Tab       naproxen 500 MG Oral Tab       topiramate 100 MG Oral Tab Take 1 tablet (100 mg total) by mouth 2 (two) times daily. 180 tablet 1    Omeprazole 40 MG Oral Capsule Delayed Release Take 1 capsule (40 mg total) by mouth daily. 90 capsule 1    ibuprofen 600 MG Oral Tab Take 1 tablet (600 mg total) by mouth every 8 (eight) hours as needed for Pain. 90 tablet 1    JACKIE BERRY OR Take by mouth daily.      NON FORMULARY daily. Soursat      NON FORMULARY daily. moranga      NON FORMULARY daily. Sea chavez      NON FORMULARY daily. vilberry      acetaminophen 500 MG Oral Tab Take 2 tablets (1,000 mg total) by mouth every 6 (six) hours as needed for Pain.      Vitamin D3 50 MCG (2000 UT) Oral Cap Take 1 capsule (2,000 Units total) by mouth daily.

## 2025-04-28 NOTE — PROGRESS NOTES
The following individual(s) verbally consented to be recorded using ambient AI listening technology and understand that they can each withdraw their consent to this listening technology at any point by asking the clinician to turn off or pause the recording:    Patient name: Yelitza Acevedo  Additional names:  Jorge LEGER Student

## 2025-05-01 ENCOUNTER — PATIENT MESSAGE (OUTPATIENT)
Dept: FAMILY MEDICINE CLINIC | Facility: CLINIC | Age: 50
End: 2025-05-01

## 2025-05-01 DIAGNOSIS — Z71.1 CONCERN ABOUT MEMORY: Primary | ICD-10-CM

## 2025-05-14 NOTE — PROGRESS NOTES
Yelitza Acevedo is a 49 year old female presents today for follow up on medical weight loss program for the treatment of overweight, obesity, or morbid obesity.    S:  Current weight   Wt Readings from Last 6 Encounters:   05/15/25 267 lb (121.1 kg)   04/28/25 270 lb (122.5 kg)   03/28/25 265 lb (120.2 kg)   02/03/25 270 lb (122.5 kg)   11/21/24 262 lb (118.8 kg)   10/30/24 261 lb (118.4 kg)    AND BMI Body mass index is 44.43 kg/m²..    Patient has lost -10# since LOV in 8/2024. No change in health since LOV. No lifestyle log completed.    Testing/consult completed since LOV: Dietician: in the past    Social hx and PMH reviewed.  with 3 adult children. Employed in  setting. Hx of migraines.    REVIEW OF SYSTEMS:  GENERAL: feels well otherwise  LUNGS: denies shortness of breath with exertion  CARDIOVASCULAR: denies chest pain on exertion, denies palpitations or pedal edema  GI: denies abdominal pain.  No N/V/D/C  MUSCULOSKELETAL: no acute joint or muscle pain  NEURO: denies headaches or dizziness  PSYCH: denies change in behavior or mood, denies feeling sad or depressed    EXAM:  /80   Pulse 64   Resp 16   Ht 5' 5\" (1.651 m)   Wt 267 lb (121.1 kg)   LMP 06/25/2024 (Exact Date)   BMI 44.43 kg/m²   GENERAL: well developed, well nourished, in no apparent distress, morbidly obese  EYES: conjunctiva pink, sclera non icteric, PERRLA  LUNGS: CTA in all fields, breathing non labored  CARDIO: RRR without murmur, normal S1 and S2 without clicks or gallops, no pedal edema.  GI: +BS  NEURO/MS: motor and sensory grossly intact  PSYCH: pleasant, cooperative, normal mood and affect    ASSESSMENT AND PLAN:  Encounter Diagnoses   Name Primary?    Encounter for therapeutic drug monitoring Yes    FLORENCE (obstructive sleep apnea)     Class 3 severe obesity with serious comorbidity and body mass index (BMI) of 45.0 to 49.9 in adult, unspecified obesity type     Migraine without status migrainosus, not  intractable, unspecified migraine type     Prediabetes          No orders of the defined types were placed in this encounter.      Meds & Refills for this Visit:  Requested Prescriptions     Signed Prescriptions Disp Refills    topiramate 100 MG Oral Tab 180 tablet 1     Sig: Take 1 tablet (100 mg total) by mouth 2 (two) times daily.    Tirzepatide-Weight Management (ZEPBOUND) 2.5 MG/0.5ML Subcutaneous Solution Auto-injector 2 mL 0     Sig: Inject 2.5 mg into the skin once a week.    Tirzepatide-Weight Management (ZEPBOUND) 5 MG/0.5ML Subcutaneous Solution Auto-injector 2 mL 3     Sig: Inject 5 mg into the skin once a week. Start after completing full 4 weeks on 2.5 mg weekly dose.       Imaging & Consults:  OP REFERRAL [facility]      Plan:  Patient has lost -10# since LOV in 8/2024 on Topamax 100 mg BID with a total weight loss of -32# since initial consult on 12/6/2018 with initial weight of 235#. Weight loss goal: to feel better and live a healthier life, both physically and metally.  CPM. Retry Zepbound for hx of FLORENCE- mild. Hx of no AOM coverage. Hx of compounded tirzepatide from outside provider. No sympathomimetics with hx of Afib and palpitations. No AOMs covered. Labs per PCP. Repeat BC completed.  on tracking nutrition, balanced nutrition and again encourage bariatric surgery option. See patient instructions below for additional plans and patient counseling.        Patient Instructions   Continue making lifestyle changes that focus on good nutrition, regular exercise and stress management.    Medication Plan: Continue Topamax at current dose. Start Zepbound at 2.5 mg weekly. After 4 weeks increase to the next dose of 5 mg weekly. If at a weight plateau for >3 weeks, then send [employer] message with current scale weight to determine if a dose adjustment is appropriate. Otherwise plan to maintain this dose until next visit. Any further dose titrations beyond this dose will be considered at  appointments only, unless otherwise discussed. Visit the website [email or web address] and click on Consumers for additional details, savings, and further dosing instructions. This medication may require a prior authorization (PA) by your insurance. A PA may take one week plus to complete and our office will be in touch during this process if needed. If cost/supply prohibitive plan: [employer] Direct cash pay program starting at $350/month.    Zepbound is being prescribed for the FDA indication for the treatment of Obstructive Sleep Apnea (FLORENCE).    Tips while taking an injectable medication:    Be an intuitive eater. Listen to your hunger and fullness signals, stopping when you are full.  Consume protein and produce in your day, striving for a rainbow of color of produce.  Reduce portions to starting size of 1 cup and check in with your gut to see if you are full. Use a sand timer to slow down your eating pace to allow for 15-20 minutes to complete a meal and use the \"2 bite rule\".  Reduce refined sugars and high fat foods, as they may contribute to greater side effects of nausea and heartburn.  Stop eating 3 hours before bedtime to allow your food to digest.  Remain hydrated with water or non caloric and non caffeine beverages.  Use over the counter yovany lozenge/supplement to help reduce nausea if needed.  If you have been off your medication for more than 2 weeks please notify our office to determine next dosing, as a return to previous dose may not be appropriate or tolerated.  Zepbound can be kept at room temperature for up to 3 weeks.    Next steps to work on before next office visit include: Great work in making changes to lifestyle! Recommend seeing dietician for consult, Praveena, and consider bariatric seminar attendance.    Attend the bariatric seminar, via virtual presentation hosted 2 Monday evenings out of the month, to learn more about surgical options for weight loss.    Call [phone] or in your  Internet search browser type in:    [email or web address]    Then click on Attend a FREE bariatric seminar    Start and/or continue tracking nutrition to remain accountable for both quality and quantity of food. Recommend setting weekly weight loss goal to 1.5-2# and caution adding your exercise, as the woody may overestimate your daily calories. Use a food scale, measuring cups and spoons. Purchase serving dishes that are 1/4 cup, 1/2 cup and 1 cup servings. Use an woody such as [employer] (www.mynetdiary.com), [employer], or LoseIT! to provide accountably, structure and support.     Balanced Nutrition includes:     Build the mentality of Food 4 Fuel. Clean eating with whole foods and eliminating/reducing ultra processed foods.  Be an intuitive eater and using mindful eating practices.  Eat a balanced plate with protein and produce at all meals: 1/4 plate- protein, 1/2 plate non starchy veggies, and 1/4 plate fruit or complex carbohydrate.  Drink water with all meals and use a salad plate to naturally reduce portions.  Eliminate/reduce late night eating by stopping after 7pm. Allowing your body to fast for 12 hours (drink only water, tea or black coffee without any additives).                    Medication use and SEs reviewed with patient.    Return in about 4 months (around 9/15/2025) for weight management via clinic or Telemedicine Visit and in clinic in December.    Patient verbalizes understanding.    DOCUMENTATION OF TIME SPENT: Code selection for this visit was based on time spent : 30 minutes on date of service in preparing to see the patient, obtaining and/or reviewing separately obtained history, performing a medically appropriate examination, counseling and educating the patient/family/caregiver, ordering medications or testing, referring and communicating with other healthcare providers, documenting clinical information in the electronic  medical record, independently interpreting results and communicating results to the patient/family/caregiver and care coordination with the patient's other providers.

## 2025-05-15 ENCOUNTER — OFFICE VISIT (OUTPATIENT)
Dept: INTERNAL MEDICINE CLINIC | Facility: CLINIC | Age: 50
End: 2025-05-15
Payer: COMMERCIAL

## 2025-05-15 VITALS
BODY MASS INDEX: 44.48 KG/M2 | HEIGHT: 65 IN | WEIGHT: 267 LBS | RESPIRATION RATE: 16 BRPM | HEART RATE: 64 BPM | SYSTOLIC BLOOD PRESSURE: 124 MMHG | DIASTOLIC BLOOD PRESSURE: 80 MMHG

## 2025-05-15 DIAGNOSIS — E66.813 CLASS 3 SEVERE OBESITY WITH SERIOUS COMORBIDITY AND BODY MASS INDEX (BMI) OF 45.0 TO 49.9 IN ADULT, UNSPECIFIED OBESITY TYPE: ICD-10-CM

## 2025-05-15 DIAGNOSIS — Z51.81 ENCOUNTER FOR THERAPEUTIC DRUG MONITORING: Primary | ICD-10-CM

## 2025-05-15 DIAGNOSIS — G47.33 OSA (OBSTRUCTIVE SLEEP APNEA): ICD-10-CM

## 2025-05-15 DIAGNOSIS — G43.909 MIGRAINE WITHOUT STATUS MIGRAINOSUS, NOT INTRACTABLE, UNSPECIFIED MIGRAINE TYPE: ICD-10-CM

## 2025-05-15 DIAGNOSIS — R73.03 PREDIABETES: ICD-10-CM

## 2025-05-15 PROCEDURE — 99214 OFFICE O/P EST MOD 30 MIN: CPT | Performed by: NURSE PRACTITIONER

## 2025-05-15 RX ORDER — TIRZEPATIDE 2.5 MG/.5ML
2.5 INJECTION, SOLUTION SUBCUTANEOUS WEEKLY
Qty: 2 ML | Refills: 0 | Status: SHIPPED | OUTPATIENT
Start: 2025-05-15

## 2025-05-15 RX ORDER — TIRZEPATIDE 5 MG/.5ML
5 INJECTION, SOLUTION SUBCUTANEOUS WEEKLY
Qty: 2 ML | Refills: 3 | Status: SHIPPED | OUTPATIENT
Start: 2025-05-15

## 2025-05-15 RX ORDER — TOPIRAMATE 100 MG/1
100 TABLET, FILM COATED ORAL 2 TIMES DAILY
Qty: 180 TABLET | Refills: 1 | Status: SHIPPED | OUTPATIENT
Start: 2025-05-15

## 2025-05-15 NOTE — PATIENT INSTRUCTIONS
Continue making lifestyle changes that focus on good nutrition, regular exercise and stress management.    Medication Plan: Continue Topamax at current dose. Start Zepbound at 2.5 mg weekly. After 4 weeks increase to the next dose of 5 mg weekly. If at a weight plateau for >3 weeks, then send [employer] message with current scale weight to determine if a dose adjustment is appropriate. Otherwise plan to maintain this dose until next visit. Any further dose titrations beyond this dose will be considered at appointments only, unless otherwise discussed. Visit the website [email or web address] and click on Consumers for additional details, savings, and further dosing instructions. This medication may require a prior authorization (PA) by your insurance. A PA may take one week plus to complete and our office will be in touch during this process if needed. If cost/supply prohibitive plan: [employer] cash pay program starting at $350/month.    Zepbound is being prescribed for the FDA indication for the treatment of Obstructive Sleep Apnea (FLORENCE).    Tips while taking an injectable medication:    Be an intuitive eater. Listen to your hunger and fullness signals, stopping when you are full.  Consume protein and produce in your day, striving for a rainbow of color of produce.  Reduce portions to starting size of 1 cup and check in with your gut to see if you are full. Use a sand timer to slow down your eating pace to allow for 15-20 minutes to complete a meal and use the \"2 bite rule\".  Reduce refined sugars and high fat foods, as they may contribute to greater side effects of nausea and heartburn.  Stop eating 3 hours before bedtime to allow your food to digest.  Remain hydrated with water or non caloric and non caffeine beverages.  Use over the counter yovany lozenge/supplement to help reduce nausea if needed.  If you have been off your medication for more than 2 weeks please notify our office to determine next dosing, as a  return to previous dose may not be appropriate or tolerated.  Zepbound can be kept at room temperature for up to 3 weeks.    Next steps to work on before next office visit include: Great work in making changes to lifestyle! Recommend seeing dietician for consult, Praveena, and consider bariatric seminar attendance.    Attend the bariatric seminar, via virtual presentation hosted 2 Monday evenings out of the month, to learn more about surgical options for weight loss.    Call [phone] or in your Internet search browser type in:    [email or web address]    Then click on Attend a FREE bariatric seminar    Start and/or continue tracking nutrition to remain accountable for both quality and quantity of food. Recommend setting weekly weight loss goal to 1.5-2# and caution adding your exercise, as the woody may overestimate your daily calories. Use a food scale, measuring cups and spoons. Purchase serving dishes that are 1/4 cup, 1/2 cup and 1 cup servings. Use an woody such as [employer] (www.Ging[employer].com), [employer]nessPal, or LoseIT! to provide accountably, structure and support.     Balanced Nutrition includes:     Build the mentality of Food 4 Fuel. Clean eating with whole foods and eliminating/reducing ultra processed foods.  Be an intuitive eater and using mindful eating practices.  Eat a balanced plate with protein and produce at all meals: 1/4 plate- protein, 1/2 plate non starchy veggies, and 1/4 plate fruit or complex carbohydrate.  Drink water with all meals and use a salad plate to naturally reduce portions.  Eliminate/reduce late night eating by stopping after 7pm. Allowing your body to fast for 12 hours (drink only water, tea or black coffee without any additives).

## 2025-05-16 PROBLEM — G47.33 OSA (OBSTRUCTIVE SLEEP APNEA): Status: ACTIVE | Noted: 2025-05-16

## 2025-05-19 ENCOUNTER — TELEPHONE (OUTPATIENT)
Dept: INTERNAL MEDICINE CLINIC | Facility: CLINIC | Age: 50
End: 2025-05-19

## 2025-05-22 DIAGNOSIS — Z00.00 LABORATORY EXAMINATION ORDERED AS PART OF A ROUTINE GENERAL MEDICAL EXAMINATION: ICD-10-CM

## 2025-05-22 DIAGNOSIS — K21.9 GASTROESOPHAGEAL REFLUX DISEASE, UNSPECIFIED WHETHER ESOPHAGITIS PRESENT: ICD-10-CM

## 2025-05-22 RX ORDER — OMEPRAZOLE 40 MG/1
40 CAPSULE, DELAYED RELEASE ORAL DAILY
Qty: 90 CAPSULE | Refills: 1 | Status: SHIPPED | OUTPATIENT
Start: 2025-05-22

## 2025-05-22 NOTE — TELEPHONE ENCOUNTER
Last office visit: 4/28/25   Protocol: pass  Requested medication(s) are due for refill today: yes  Requested medication(s) are on the active medication list same strength, form, dose/ sig: yes  Requested medication(s) are managed by provider: yes  Patient has already received a courtsey refill: no    NOV: 5/27/25    Asked to Return: 5/26/25

## 2025-05-27 ENCOUNTER — OFFICE VISIT (OUTPATIENT)
Dept: FAMILY MEDICINE CLINIC | Facility: CLINIC | Age: 50
End: 2025-05-27
Payer: COMMERCIAL

## 2025-05-27 VITALS
HEART RATE: 67 BPM | OXYGEN SATURATION: 100 % | BODY MASS INDEX: 44.88 KG/M2 | DIASTOLIC BLOOD PRESSURE: 78 MMHG | WEIGHT: 269.38 LBS | RESPIRATION RATE: 18 BRPM | HEIGHT: 65 IN | SYSTOLIC BLOOD PRESSURE: 126 MMHG

## 2025-05-27 DIAGNOSIS — I10 ESSENTIAL HYPERTENSION: Primary | ICD-10-CM

## 2025-05-27 DIAGNOSIS — R74.8 ELEVATED ALKALINE PHOSPHATASE LEVEL: ICD-10-CM

## 2025-05-27 DIAGNOSIS — Z00.00 LABORATORY EXAMINATION ORDERED AS PART OF A ROUTINE GENERAL MEDICAL EXAMINATION: ICD-10-CM

## 2025-05-27 DIAGNOSIS — E55.9 VITAMIN D DEFICIENCY: ICD-10-CM

## 2025-05-27 DIAGNOSIS — Z13.89 SCREENING FOR GENITOURINARY CONDITION: ICD-10-CM

## 2025-05-27 PROCEDURE — 99213 OFFICE O/P EST LOW 20 MIN: CPT | Performed by: FAMILY MEDICINE

## 2025-05-27 RX ORDER — AMLODIPINE BESYLATE 5 MG/1
5 TABLET ORAL DAILY
Qty: 90 TABLET | Refills: 1 | Status: SHIPPED | OUTPATIENT
Start: 2025-05-27 | End: 2026-05-22

## 2025-05-27 NOTE — PROGRESS NOTES
Subjective:   Yelitza Acevedo is a 49 year old female who presents for Follow - Up (Blood pressure )         History/Other:   History of Present Illness  Yelitza Acevedo is a 49 year old female with hypertension who presents for a blood pressure check.    She has been monitoring her blood pressure at home, noting variable readings with an average of 127/96 mmHg in the evening and 121/89 mmHg in the morning. Recent readings include 127/96 mmHg last Friday evening and 131/86 mmHg the day before, also in the evening.    She is consistently taking her medications, which include metoprolol 75 mg daily and amlodipine 5 mg daily. She has been on these medications since her last visit. Amlodipine was initially prescribed in February of last year and again by an ER doctor in March of the same year. She recently refilled her metoprolol prescription but is unsure about the current status of her amlodipine prescription.    No swelling from amlodipine use, although she experienced some when she first started the medication. She has not experienced any swelling since resuming the medication.       Chief Complaint Reviewed and Verified  Nursing Notes Reviewed and   Verified  Tobacco Reviewed  Allergies Reviewed  Medications Reviewed    Problem List Reviewed  Medical History Reviewed  Surgical History   Reviewed  OB Status Reviewed  Family History Reviewed         Tobacco:  She has never smoked tobacco.    Current Medications[1]      Review of Systems:  Review of Systems  A comprehensive 10 point review of systems was completed.  Pertinent positives and negatives noted in the the HPI.     Objective:   /78   Pulse 67   Resp 18   Ht 5' 5\" (1.651 m)   Wt 269 lb 6 oz (122.2 kg)   LMP 06/25/2024 (Exact Date)   SpO2 100%   BMI 44.83 kg/m²  Estimated body mass index is 44.83 kg/m² as calculated from the following:    Height as of this encounter: 5' 5\" (1.651 m).    Weight as of this encounter: 269 lb 6 oz  (122.2 kg).  Physical Exam      GENERAL: well developed, well nourished,in no apparent distress  PSYCHE: normal mood and affect  SKIN: no rashes,no suspicious lesions  LUNGS: clear to auscultation  CARDIO: RRR without murmur  GI: good BS's,no masses, HSM or tenderness  EXTREMITIES: no cyanosis, clubbing or edema    Results        Assessment & Plan:   1. Essential hypertension (Primary)  -     amLODIPine Besylate; Take 1 tablet (5 mg total) by mouth daily.  Dispense: 90 tablet; Refill: 1  2. Laboratory examination ordered as part of a routine general medical examination  -     CBC With Differential With Platelet; Future; Expected date: 09/20/2025  -     Comp Metabolic Panel (14); Future; Expected date: 09/20/2025  -     TSH W Reflex To Free T4; Future; Expected date: 09/20/2025  -     Lipid Panel; Future; Expected date: 09/20/2025  -     Vitamin D; Future; Expected date: 09/20/2025  -     Hemoglobin A1C; Future; Expected date: 09/20/2025  3. Vitamin D deficiency  -     Vitamin D; Future; Expected date: 09/20/2025  4. Screening for genitourinary condition  -     Urinalysis, Routine; Future; Expected date: 09/20/2025    Assessment & Plan  Hypertension  Hypertension well-controlled in office, variable elevated diastolic pressures at home. Possible inaccuracy of home monitor. No edema from amlodipine.  - Prescribe 90-day supply of amlodipine 5 mg with refill.  - Advise obtaining new blood pressure monitor.  - Suggest nurse's visit to compare new home monitor with office monitor for accuracy.      Return in about 5 months (around 10/27/2025) for physical.      Jenny Santizo DO, 5/27/2025, 4:48 PM               [1]   Current Outpatient Medications   Medication Sig Dispense Refill    amLODIPine 5 MG Oral Tab Take 1 tablet (5 mg total) by mouth daily. 90 tablet 1    OMEPRAZOLE 40 MG Oral Capsule Delayed Release TAKE 1 CAPSULE(40 MG) BY MOUTH DAILY 90 capsule 1    topiramate 100 MG Oral Tab Take 1 tablet (100 mg total) by  mouth 2 (two) times daily. 180 tablet 1    Tirzepatide-Weight Management (ZEPBOUND) 2.5 MG/0.5ML Subcutaneous Solution Auto-injector Inject 2.5 mg into the skin once a week. 2 mL 0    Tirzepatide-Weight Management (ZEPBOUND) 5 MG/0.5ML Subcutaneous Solution Auto-injector Inject 5 mg into the skin once a week. Start after completing full 4 weeks on 2.5 mg weekly dose. 2 mL 3    metoprolol succinate ER 50 MG Oral Tablet 24 Hr Take 1 tablet (50 mg total) by mouth daily. 90 tablet 1    metoprolol succinate ER 25 MG Oral Tablet 24 Hr Take 1 tablet (25 mg total) by mouth daily. 90 tablet 1    cyclobenzaprine 10 MG Oral Tab Take 1 tablet (10 mg total) by mouth 3 (three) times daily as needed for Muscle spasms. 30 tablet 1    metoclopramide 10 MG Oral Tab       naproxen 500 MG Oral Tab       ibuprofen 600 MG Oral Tab Take 1 tablet (600 mg total) by mouth every 8 (eight) hours as needed for Pain. 90 tablet 1    JACKIE BERRY OR Take by mouth daily.      NON FORMULARY daily. Soursat      NON FORMULARY daily. moranga      NON FORMULARY daily. Sea chavez      NON FORMULARY daily. vilberry      acetaminophen 500 MG Oral Tab Take 2 tablets (1,000 mg total) by mouth every 6 (six) hours as needed for Pain.      Vitamin D3 50 MCG (2000 UT) Oral Cap Take 1 capsule (2,000 Units total) by mouth daily.

## 2025-05-27 NOTE — PROGRESS NOTES
The following individual(s) verbally consented to be recorded using ambient AI listening technology and understand that they can each withdraw their consent to this listening technology at any point by asking the clinician to turn off or pause the recording:    Patient name: Yelitza Acevedo

## 2025-06-13 ENCOUNTER — PATIENT MESSAGE (OUTPATIENT)
Dept: FAMILY MEDICINE CLINIC | Facility: CLINIC | Age: 50
End: 2025-06-13

## 2025-06-13 NOTE — TELEPHONE ENCOUNTER
Appt booked for next Thursday  Offered sooner appt but this is what works with pt's work schedule   Fyi

## 2025-06-23 ENCOUNTER — HOSPITAL ENCOUNTER (OUTPATIENT)
Age: 50
Discharge: HOME | End: 2025-06-23
Payer: COMMERCIAL

## 2025-06-23 VITALS
HEIGHT: 61 IN | SYSTOLIC BLOOD PRESSURE: 139 MMHG | DIASTOLIC BLOOD PRESSURE: 83 MMHG | BODY MASS INDEX: 49.09 KG/M2 | HEART RATE: 64 BPM | WEIGHT: 260 LBS | OXYGEN SATURATION: 97 % | TEMPERATURE: 98 F | RESPIRATION RATE: 18 BRPM

## 2025-06-23 DIAGNOSIS — M54.32 SCIATICA OF LEFT SIDE: Primary | ICD-10-CM

## 2025-06-23 PROCEDURE — 99213 OFFICE O/P EST LOW 20 MIN: CPT

## 2025-06-23 RX ORDER — METHYLPREDNISOLONE 4 MG/1
TABLET ORAL
Qty: 1 EACH | Refills: 0 | Status: SHIPPED | OUTPATIENT
Start: 2025-06-23

## 2025-06-23 RX ORDER — CYCLOBENZAPRINE HCL 10 MG
10 TABLET ORAL 3 TIMES DAILY PRN
Qty: 20 TABLET | Refills: 0 | Status: SHIPPED | OUTPATIENT
Start: 2025-06-23 | End: 2025-06-30

## 2025-06-23 NOTE — DISCHARGE INSTRUCTIONS
VISIT SUMMARY:  Today, you were seen for sciatica pain that started on Friday morning. This pain is similar to previous episodes but is not responding to the usual medications you have taken in the past.    YOUR PLAN:  -SCIATICA: Sciatica is pain that radiates along the path of the sciatic nerve, which extends from your lower back through your hips and buttocks and down each leg. Your current episode of sciatica is not responding to your usual medications. We have prescribed a steroid and a muscle relaxer to help manage your pain. Please be aware that the muscle relaxer can cause drowsiness, so avoid driving, operating heavy machinery, or caring for small children while taking it. It is best to take the muscle relaxer before bed, or you can halve the tablet if it feels too strong. If your pain does not improve after a week, follow up with your family doctor for a possible referral to a spine specialist. Your prescription has been sent to [facility] on [address]    INSTRUCTIONS:  Follow up with your family doctor if your pain persists after a week for a potential referral to a spine specialist.    Contains text generated by Chelsea

## 2025-06-23 NOTE — ED PROVIDER NOTES
Patient Seen in: [facility]       The following individual(s) verbally consented to be recorded using ambient AI listening technology and understand that they can each withdraw their consent to this listening technology at any point by asking the clinician to turn off or pause the recording:    Patient name: Yelitza Acevedo        History  No chief complaint on file.    Stated Complaint: Sciatica    Subjective:   HPI     Yelitza Acevedo is a 49 year old female who presents with sciatica pain.    She began experiencing sciatica pain on Friday morning, consistent with previous episodes, the last of which occurred a few months ago. During past episodes, muscle relaxers and ibuprofen were effective, but the current pain is not alleviated by these medications.    The pain was previously located in the gluteal region but is not present there currently. It is exacerbated when the sciatic nerve is extended. No loss of bladder or bowel control is reported. She has not taken any new medications for this episode yet.        Objective:     No pertinent past medical history.            No pertinent past surgical history.              No pertinent social history.            Review of Systems   Constitutional: Negative.    HENT: Negative.     Eyes: Negative.    Respiratory: Negative.     Cardiovascular: Negative.    Gastrointestinal: Negative.    Endocrine: Negative.    Genitourinary: Negative.    Musculoskeletal:  Positive for back pain.   Neurological: Negative.        Positive for stated complaint: Sciatica  Other systems are as noted in HPI.  Constitutional and vital signs reviewed.      All other systems reviewed and negative except as noted above.                  Physical Exam    ED Triage Vitals [06/23/25 0955]   /83   Pulse 64   Resp 18   Temp 98.1 °F (36.7 °C)   Temp src    SpO2 97 %   O2 Device None (Room air)       Current Vitals:   Vital Signs  BP: 139/83  Pulse: 64  Resp: 18  Temp:  98.1 °F (36.7 °C)    Oxygen Therapy  SpO2: 97 %  O2 Device: None (Room air)            Physical Exam  Vitals and nursing note reviewed.   Constitutional:       Appearance: Normal appearance. She is normal weight.   HENT:      Head: Normocephalic.      Right Ear: External ear normal.      Left Ear: External ear normal.   Eyes:      Extraocular Movements: Extraocular movements intact.      Conjunctiva/sclera: Conjunctivae normal.      Pupils: Pupils are equal, round, and reactive to light.   Pulmonary:      Effort: Pulmonary effort is normal.   Musculoskeletal:         General: Tenderness present.      Cervical back: Normal range of motion and neck supple.      Comments: Positive left straight leg raise.   Neurological:      Mental Status: She is alert.      Motor: No weakness.   Psychiatric:         Mood and Affect: Mood normal.               ED Course  Labs Reviewed - No data to display                         MDM     Yelitza Acevedo is a 49 year old female who presents with sciatica pain.    She began experiencing sciatica pain on Friday morning, consistent with previous episodes, the last of which occurred a few months ago. During past episodes, muscle relaxers and ibuprofen were effective, but the current pain is not alleviated by these medications.    The pain was previously located in the gluteal region but is not present there currently. It is exacerbated when the sciatic nerve is extended. No loss of bladder or bowel control is reported. She has not taken any new medications for this episode yet.  Vital signs: Please see EMR.  Physical exam: Please see exam.  Differential diagnosis: Sciatica, lumbar strain, lumbago.  Assessment & Plan  Sciatica  Sciatica with gluteal pain unresponsive to prior treatment. No bladder or bowel control loss. Discussed muscle relaxer drowsiness risks.  - Prescribe steroid and muscle relaxer.  - Advise against driving, operating heavy machinery, or caring for small children  while taking muscle relaxer.  - Instruct to take muscle relaxer before bed or halve tablet if too strong.  - Advise follow-up with family doctor if pain persists after a week for potential referral to a spine specialist.  - Send prescription to [facility] [address]    Based on physical exam and HPI will diagnose with sciatica and treat with a steroid pack and muscle relaxer.  Patient to follow-up with primary care provider.  ED precautions given.            Medical Decision Making  Yelitza Acevedo is a 49 year old female who presents with sciatica pain.    She began experiencing sciatica pain on Friday morning, consistent with previous episodes, the last of which occurred a few months ago. During past episodes, muscle relaxers and ibuprofen were effective, but the current pain is not alleviated by these medications.    The pain was previously located in the gluteal region but is not present there currently. It is exacerbated when the sciatic nerve is extended. No loss of bladder or bowel control is reported. She has not taken any new medications for this episode yet.    Problems Addressed:  Sciatica of left side: acute illness or injury    Risk  Prescription drug management.        Disposition and Plan     Clinical Impression:  1. Sciatica of left side         Disposition:  Discharge  6/23/2025 10:29 am    Follow-up:  Jenny Santizo DO  [address] [address] [phone]    In 1 week            Medications Prescribed:  Current Discharge Medication List        START taking these medications    Details   methylPREDNISolone (MEDROL) 4 MG Oral Tablet Therapy Pack Dosepack: take as directed  Qty: 1 each, Refills: 0      !! cyclobenzaprine 10 MG Oral Tab Take 1 tablet (10 mg total) by mouth 3 (three) times daily as needed for Muscle spasms.  Qty: 20 tablet, Refills: 0       !! - Potential duplicate medications found. Please discuss with provider.                Supplementary Documentation:

## 2025-06-27 ENCOUNTER — HOSPITAL ENCOUNTER (OUTPATIENT)
Dept: MAMMOGRAPHY | Age: 50
Discharge: HOME | End: 2025-06-27
Attending: FAMILY MEDICINE
Payer: COMMERCIAL

## 2025-06-27 DIAGNOSIS — Z12.31 ENCOUNTER FOR SCREENING MAMMOGRAM FOR MALIGNANT NEOPLASM OF BREAST: ICD-10-CM

## 2025-06-27 PROCEDURE — 77067 SCR MAMMO BI INCL CAD: CPT | Performed by: FAMILY MEDICINE

## 2025-06-27 PROCEDURE — 77063 BREAST TOMOSYNTHESIS BI: CPT | Performed by: FAMILY MEDICINE

## 2025-07-24 PROBLEM — G31.84 MILD COGNITIVE IMPAIRMENT: Status: ACTIVE | Noted: 2025-07-24

## (undated) DIAGNOSIS — H93.8X3 PRESSURE SENSATION IN BOTH EARS: ICD-10-CM

## (undated) DIAGNOSIS — R51.9 TEMPORAL HEADACHE: Primary | ICD-10-CM

## (undated) DIAGNOSIS — H93.13 TINNITUS OF BOTH EARS: Primary | ICD-10-CM

## (undated) DEVICE — ENDOSCOPY PACK - LOWER: Brand: MEDLINE INDUSTRIES, INC.

## (undated) DEVICE — 1200CC GUARDIAN II: Brand: GUARDIAN

## (undated) DEVICE — Device: Brand: DEFENDO AIR/WATER/SUCTION AND BIOPSY VALVE

## (undated) DEVICE — 3M™ RED DOT™ MONITORING ELECTRODE WITH FOAM TAPE AND STICKY GEL, 50/BAG, 20/CASE, 72/PLT 2570: Brand: RED DOT™

## (undated) DEVICE — FILTERLINE NASAL ADULT O2/CO2

## (undated) NOTE — MR AVS SNAPSHOT
After Visit Summary   10/23/2020    Nish Meza    MRN: [id number]           Visit Information     Date & Time  10/23/2020  8:00 AM Provider  Zeinab Bueno DO [place] [address], [facility] Dept.  Phone  [phone] Need for vaccination   [[id number]]             We Ordered the Following     Normal Orders This Visit    FLULAVAL INFLUENZA VACCINE QUAD PRESERVATIVE FREE 0.5 ML [46979 CPT(R)]     THINPREP PAP WITH HPV REFLEX REQUEST B [FHU3278 CUSTOM]     THINPREP PAP WITH H If you receive a survey from [employer], please take a few minutes to complete it and provide feedback. We strive to deliver the best patient experience and are looking for ways to make improvements. Your feedback will help us do so.  For more information EMERGENCY ROOM Life-threatening emergencies needing immediate intervention at a hospital emergency room.  Average cost  $2,300*   *Cost varies based on your insurance coverage  For more information about hours, locations or appointment options available at

## (undated) NOTE — MR AVS SNAPSHOT
After Visit Summary   5/18/2022    Naresh Mobley   MRN: [id number]           Visit Information     Date & Time  5/18/2022 12:00 PM Provider  Bridget Cisneros MD Department  [facility] Neurodiagnostics Dept. Phone  [phone]      Your Vitals Were     LMP   12/14/2021 (Exact Date)             Allergies as of 5/18/2022  Review status set to Review Complete on 3/10/2022   No Known Allergies     Your Current Medications        Dosage    metoprolol succinate 25 MG Oral Tablet 24 Hr Take 25 mg by mouth daily. ibuprofen 200 MG Oral Tab Take 200 mg by mouth every 6 (six) hours as needed for Pain. Omeprazole 40 MG Oral Capsule Delayed Release Take 1 capsule (40 mg total) by mouth daily. topiramate 50 MG Oral Tab Take 50 mg by mouth nightly. acetaminophen 500 MG Oral Tab Take 1,000 mg by mouth every 6 (six) hours as needed for Pain. Vitamin D3 50 MCG (2000 UT) Oral Cap Take 2,000 Units by mouth daily. Fluticasone Propionate 50 MCG/ACT Nasal Suspension 2 sprays by Nasal route daily. Diagnoses for This Visit    Paresthesia of both hands   [3755780]  -  Primary  Bilateral carpal tunnel syndrome   [108139]             We Ordered the Following     Normal Orders This Visit    EMG TRANSCRIPTION [NEU5 CUSTOM]     EMG [NEU5 CUSTOM]                 Did you know that [facility] [place] now offer Video Visits through [address] for adult patients for a variety of conditions such as allergies, back pain and cold symptoms? Skip the drive and waiting room and online chat with a doctor face-to-face using your web-cam enabled computer or mobile device wherever you are. Video Visits cost $50 and can be paid hassle-free using a credit, debit, or health savings card. Not active on [employer]? Ask us how to get signed up today! If you receive a survey from [employer], please take a few minutes to complete it and provide feedback.  We strive to deliver the best patient experience and are looking for ways to make improvements. Your feedback will help us do so. For more information on [employer], please visit [email or web address]           No text in SmartText           No text in SmartText

## (undated) NOTE — LETTER
Yelitza Acevedo, :1975    CONSENT FOR PROCEDURE/SEDATION    1. I authorize the performance upon Yelitza Acevedo  the following: Endometrial Biopsy    2. I authorize Dr. Heydi Cagle MD (and whomever is designated as the doctor’s assistant), to perform the above-mentioned procedures.    3. If any unforeseen conditions arise during this procedure calling for additional  procedures, operations, or medications (including anesthesia and blood transfusion), I further request and authorize the doctor to do whatever he/she deems advisable in my interest.    4. I consent to the taking and reproduction of any photographs in the course of this procedure for professional purposes.    5. I consent to the administration of such sedation as may be considered necessary or advisable by the physician responsible for this service, with the exception of ______________________________________________________    6. I have been informed by my doctor of the nature and purpose of this procedure sedation, possible alternative methods of treatment, risk involved and possible complications.    7. If I have a Do Not Resuscitate (DNR) order in place, the physician and I (or the individual authorized to consent on my behalf) will discuss and agree as to whether the Do Not Resuscitate (DNR) order will remain in effect or will be discontinued during the performance of the procedure and the applicable recovery period. If the Do Not Resuscitate (DNR) order is discontinued and is to be reinstated following the procedure/recovery period, the physician will determine when the applicable recovery period ends for purposes of reinstating the Do Not Resuscitate (DNR) order.    Signature of Patient:_______________________________________________    Signature of person authorized to consent for patient:  _______________________________________________________________    Relationship to patient:  ____________________________________________    Witness: _________________________________________ Date:___________     Physician Signature: _______________________________ Date:___________

## (undated) NOTE — Clinical Note
2Patient was seen for their 2 month f/u at [facility] with a total weight loss of 0# since initial consult.

## (undated) NOTE — MR AVS SNAPSHOT
[facility], [address] [place] [id number]               Thank you for choosing us for your health care visit with Gunjan Schwartz DO.   We are glad to serve you and happy to provide you with this summary schedule your appointment. Failure to obtain required authorization numbers can create reimbursement difficulties for you.     Assoc Dx:  Flat feet, bilateral [M21.41, M21.42], Chronic pain of right ankle [M25.571, G89.29], Left foot pain [M79.672] medications prescribed for you. Read the directions carefully, and ask your doctor or other care provider to review them with you.             MyChart     Visit [facility] [employer] [facility]  You can access your MyChart to more actively manage your health care and view more deta

## (undated) NOTE — LETTER
Yelitza Acevedo, :1975    CONSENT FOR PROCEDURE/SEDATION    1. I authorize the performance upon Yelitza Cm Curtis  the followin. I authorize Dr. Heydi Cagle MD (and whomever is designated as the doctor’s assistant), to perform the above-mentioned procedures.    3. If any unforeseen conditions arise during this procedure calling for additional  procedures, operations, or medications (including anesthesia and blood transfusion), I further request and authorize the doctor to do whatever he/she deems advisable in my interest.    4. I consent to the taking and reproduction of any photographs in the course of this procedure for professional purposes.    5. I consent to the administration of such sedation as may be considered necessary or advisable by the physician responsible for this service, with the exception of ______________________________________________________    6. I have been informed by my doctor of the nature and purpose of this procedure sedation, possible alternative methods of treatment, risk involved and possible complications.    7. If I have a Do Not Resuscitate (DNR) order in place, the physician and I (or the individual authorized to consent on my behalf) will discuss and agree as to whether the Do Not Resuscitate (DNR) order will remain in effect or will be discontinued during the performance of the procedure and the applicable recovery period. If the Do Not Resuscitate (DNR) order is discontinued and is to be reinstated following the procedure/recovery period, the physician will determine when the applicable recovery period ends for purposes of reinstating the Do Not Resuscitate (DNR) order.    Signature of Patient:_______________________________________________    Signature of person authorized to consent for patient:  _______________________________________________________________    Relationship to patient: ____________________________________________    Witness:  _________________________________________ Date:___________     Physician Signature: _______________________________ Date:___________

## (undated) NOTE — MR AVS SNAPSHOT
After Visit Summary   10/25/2021    Dominguez Amado   MRN: [id number]           Visit Information     Date & Time  10/25/2021  7:00 AM Provider  Hansel Cummings [place] [address], [address]  Phone  [phone] (around 1/25/2022) for weight and periods.      We Ordered the Following     Normal Orders This Visit    CHLAMYDIA/GONOCOCCUS, VASU [4092410 CUSTOM]     FLULAVAL INFLUENZA VACCINE QUAD PRESERVATIVE FREE 0.5 ML [31746 CPT(R)]     ORTHOPEDIC - INTERNAL [450926 device wherever you are. Video Visits cost $50 and can be paid hassle-free using a credit, debit, or health savings card. Not active on [employer]? Ask us how to get signed up today!         If you receive a survey from [employer], please take a few minutes

## (undated) NOTE — ED AVS SNAPSHOT
Danielle Normacory   MRN: [id number]    Department:  [facility] Emergency Department   Date of Visit:  10/11/2018           Disclosure     Insurance plans vary and the physician(s) referred by the ER may not be covered by your plan.  Please contact you tell this physician (or your personal doctor if your instructions are to return to your personal doctor) about any new or lasting problems. The primary care or specialist physician will see patients referred from the [facility] Emergency Department.  Melanie Whelan

## (undated) NOTE — ED AVS SNAPSHOT
Alycia Osler   MRN: [id number]    Department:  [facility] Emergency Department   Date of Visit:  5/12/2019           Disclosure     Insurance plans vary and the physician(s) referred by the ER may not be covered by your plan.  Please contact your tell this physician (or your personal doctor if your instructions are to return to your personal doctor) about any new or lasting problems. The primary care or specialist physician will see patients referred from the [facility] Emergency Department.  Slim Garcia

## (undated) NOTE — LETTER
[address] [facility] [address], [address] [place] [address]            January 7, 2019      [address]      Dear Ms. James Franks

## (undated) NOTE — LETTER
Date & Time: 12/13/2023, 1:07 PM  Patient: Janice Abernathy  Encounter Provider(s):    SHAYY Casillas       To Whom It May Concern:    Janice Abernathy was seen and treated in our department on 12/13/23. Please excuse her from work until 12/15/23 when she can return if without fever (<100.4) and if feeling better. If you have any questions or concerns, please do not hesitate to call.        ________________________________________________  Velna Councilman.  CHEIKH Berry, SHAYY, AGACNP-BC, FNP-C, CNL  Adult-Gerontology Acute Care & Family Nurse Practitioner

## (undated) NOTE — ED AVS SNAPSHOT
Jorge Frey   MRN: [id number]    Department:  [facility] Emergency Department   Date of Visit:  9/9/2018           Disclosure     Insurance plans vary and the physician(s) referred by the ER may not be covered by your plan.  Please contact your tell this physician (or your personal doctor if your instructions are to return to your personal doctor) about any new or lasting problems. The primary care or specialist physician will see patients referred from the [facility] Emergency Department.  Jaya Quach

## (undated) NOTE — Clinical Note
Thank you for referring Bravo Kemp to the [facility]. I met with her in consultation today. I have ordered labs, set up a nutrition consultation with our dietician.   She was started on Trokendi XR for medication therapy and will follow-up with

## (undated) NOTE — LETTER
[facility] Department of OB/GYN  After Care Instructions for Endometrial Biopsy      Biopsy Results   You will receive a phone call with your biopsy results in 7 business days.  If you have not received your results in 7 days, please contact our office.  The results of your biopsy will determine if further treatment will be necessary.    Bleeding   You may have some light bleeding or blackish clumpy discharge for several days after your biopsy.    Restrictions    You should avoid intercourse or tampon use for 1 day after your biopsy.    Pain    You may experience mild menstrual cramping after your biopsy.  You may use Ibuprofen, Aleve or Tylenol to relieve your discomfort.  If you experience severe or persistent pain contact our office.      If you have any additional questions, please call us at [phone].

## (undated) NOTE — LETTER
01/04/22        [address]      Dear Roseann Brock,    [address] records indicate that you have outstanding lab work and or testing that was ordered for you and has not yet been completed:  Orders Placed This Encounter

## (undated) NOTE — ED AVS SNAPSHOT
Jenelle Zhang   MRN: [id number]    Department:  [facility] Emergency Department   Date of Visit:  10/29/2019           Disclosure     Insurance plans vary and the physician(s) referred by the ER may not be covered by your plan.  Please contact you tell this physician (or your personal doctor if your instructions are to return to your personal doctor) about any new or lasting problems. The primary care or specialist physician will see patients referred from the [facility] Emergency Department.  Errol Hill

## (undated) NOTE — MR AVS SNAPSHOT
[facility], [address] [place] [phone]               Thank you for choosing us for your health care visit with Chel Lainez DO.   We are glad to serve you and happy to provide you with this summary Fluticasone Propionate 50 MCG/ACT Susp   1 spray by Nasal route daily. Commonly known as:  FLONASE           GLUCOSAMINE CHONDROIT-COLLAGEN OR   Take  by mouth.            HYDROcodone-acetaminophen  MG Tabs   Take 1 tablet by mouth every 8 (eight) If you've recently had a stay at the Hospital you can access your discharge instructions in [employer] by going to Visits < Admission Summaries.  If you've been to the Emergency Department or your doctor's office, you can view your past visit information in My Visit [facility] online at  [email or web address]